# Patient Record
Sex: FEMALE | Race: WHITE | NOT HISPANIC OR LATINO | Employment: PART TIME | URBAN - METROPOLITAN AREA
[De-identification: names, ages, dates, MRNs, and addresses within clinical notes are randomized per-mention and may not be internally consistent; named-entity substitution may affect disease eponyms.]

---

## 2017-10-16 ENCOUNTER — CONVERSION ENCOUNTER (OUTPATIENT)
Dept: OTHER | Facility: HOSPITAL | Age: 62
End: 2017-10-16

## 2018-01-03 ENCOUNTER — OFFICE VISIT CONVERTED (OUTPATIENT)
Dept: FAMILY MEDICINE CLINIC | Age: 63
End: 2018-01-03
Attending: FAMILY MEDICINE

## 2018-02-06 ENCOUNTER — OFFICE VISIT CONVERTED (OUTPATIENT)
Dept: FAMILY MEDICINE CLINIC | Age: 63
End: 2018-02-06
Attending: FAMILY MEDICINE

## 2018-04-11 ENCOUNTER — OFFICE VISIT CONVERTED (OUTPATIENT)
Dept: FAMILY MEDICINE CLINIC | Age: 63
End: 2018-04-11
Attending: FAMILY MEDICINE

## 2018-06-27 ENCOUNTER — OFFICE VISIT CONVERTED (OUTPATIENT)
Dept: FAMILY MEDICINE CLINIC | Age: 63
End: 2018-06-27
Attending: FAMILY MEDICINE

## 2018-10-19 ENCOUNTER — OFFICE VISIT CONVERTED (OUTPATIENT)
Dept: FAMILY MEDICINE CLINIC | Age: 63
End: 2018-10-19
Attending: FAMILY MEDICINE

## 2018-10-29 ENCOUNTER — OFFICE VISIT CONVERTED (OUTPATIENT)
Dept: FAMILY MEDICINE CLINIC | Age: 63
End: 2018-10-29
Attending: FAMILY MEDICINE

## 2018-11-02 ENCOUNTER — OFFICE VISIT CONVERTED (OUTPATIENT)
Dept: PHYSICAL THERAPY | Facility: CLINIC | Age: 63
End: 2018-11-02
Attending: ORTHOPAEDIC SURGERY

## 2018-11-02 ENCOUNTER — CONVERSION ENCOUNTER (OUTPATIENT)
Dept: PHYSICAL THERAPY | Facility: CLINIC | Age: 63
End: 2018-11-02

## 2018-11-20 ENCOUNTER — OFFICE VISIT CONVERTED (OUTPATIENT)
Dept: CARDIOLOGY | Facility: CLINIC | Age: 63
End: 2018-11-20
Attending: SPECIALIST

## 2018-12-28 ENCOUNTER — CONVERSION ENCOUNTER (OUTPATIENT)
Dept: CARDIOLOGY | Facility: CLINIC | Age: 63
End: 2018-12-28
Attending: SPECIALIST

## 2019-01-04 ENCOUNTER — HOSPITAL ENCOUNTER (OUTPATIENT)
Dept: OTHER | Facility: HOSPITAL | Age: 64
Discharge: HOME | End: 2019-01-04
Attending: FAMILY MEDICINE

## 2019-01-04 ENCOUNTER — OFFICE VISIT CONVERTED (OUTPATIENT)
Dept: FAMILY MEDICINE CLINIC | Age: 64
End: 2019-01-04
Attending: FAMILY MEDICINE

## 2019-01-04 LAB
25(OH)D3 SERPL-MCNC: 48 NG/ML (ref 30–100)
ALBUMIN SERPL-MCNC: 4.3 G/DL (ref 3.5–5)
ALBUMIN/GLOB SERPL: 1.7 {RATIO} (ref 1.4–2.6)
ALP SERPL-CCNC: 66 U/L (ref 43–160)
ALT SERPL-CCNC: 13 U/L (ref 10–40)
ANION GAP SERPL CALC-SCNC: 15 MMOL/L (ref 8–19)
AST SERPL-CCNC: 14 U/L (ref 15–50)
BASOPHILS # BLD MANUAL: 0.07 10*3/UL (ref 0–0.2)
BASOPHILS NFR BLD MANUAL: 0.7 % (ref 0–3)
BILIRUB SERPL-MCNC: 0.6 MG/DL (ref 0.2–1.3)
BUN SERPL-MCNC: 12 MG/DL (ref 5–25)
BUN/CREAT SERPL: 19 {RATIO} (ref 6–20)
CALCIUM SERPL-MCNC: 9.2 MG/DL (ref 8.7–10.4)
CHLORIDE SERPL-SCNC: 105 MMOL/L (ref 99–111)
CHOLEST SERPL-MCNC: 170 MG/DL (ref 107–200)
CHOLEST/HDLC SERPL: 3.6 {RATIO} (ref 3–6)
CONV CO2: 26 MMOL/L (ref 22–32)
CONV TOTAL PROTEIN: 6.9 G/DL (ref 6.3–8.2)
CREAT UR-MCNC: 0.64 MG/DL (ref 0.5–0.9)
DEPRECATED RDW RBC AUTO: 42.6 FL
EOSINOPHIL # BLD MANUAL: 0.36 10*3/UL (ref 0–0.7)
EOSINOPHIL NFR BLD MANUAL: 3.8 % (ref 0–7)
ERYTHROCYTE [DISTWIDTH] IN BLOOD BY AUTOMATED COUNT: 12.6 % (ref 11.5–14.5)
GFR SERPLBLD BASED ON 1.73 SQ M-ARVRAT: >60 ML/MIN/{1.73_M2}
GLOBULIN UR ELPH-MCNC: 2.6 G/DL (ref 2–3.5)
GLUCOSE SERPL-MCNC: 108 MG/DL (ref 65–99)
GRANS (ABSOLUTE): 6.12 10*3/UL (ref 2–8)
GRANS: 64.7 % (ref 30–85)
HBA1C MFR BLD: 14.2 G/DL (ref 12–16)
HCT VFR BLD AUTO: 43.7 % (ref 37–47)
HDLC SERPL-MCNC: 47 MG/DL (ref 40–60)
IMM GRANULOCYTES # BLD: 0.01 10*3/UL (ref 0–0.54)
IMM GRANULOCYTES NFR BLD: 0.1 % (ref 0–0.43)
LDLC SERPL CALC-MCNC: 104 MG/DL (ref 70–100)
LYMPHOCYTES # BLD MANUAL: 2.21 10*3/UL (ref 1–5)
LYMPHOCYTES NFR BLD MANUAL: 7.4 % (ref 3–10)
MCH RBC QN AUTO: 29.6 PG (ref 27–31)
MCHC RBC AUTO-ENTMCNC: 32.5 G/DL (ref 33–37)
MCV RBC AUTO: 91.2 FL (ref 81–99)
MONOCYTES # BLD AUTO: 0.7 10*3/UL (ref 0.2–1.2)
OSMOLALITY SERPL CALC.SUM OF ELEC: 294 MOSM/KG (ref 273–304)
PLATELET # BLD AUTO: 281 10*3/UL (ref 130–400)
PMV BLD AUTO: 10.8 FL (ref 7.4–10.4)
POTASSIUM SERPL-SCNC: 3.6 MMOL/L (ref 3.5–5.3)
RBC # BLD AUTO: 4.79 10*6/UL (ref 4.2–5.4)
SODIUM SERPL-SCNC: 142 MMOL/L (ref 135–147)
T4 FREE SERPL-MCNC: 1.6 NG/DL (ref 0.9–1.8)
TRIGL SERPL-MCNC: 97 MG/DL (ref 40–150)
TSH SERPL-ACNC: 0.14 M[IU]/L (ref 0.27–4.2)
VARIANT LYMPHS NFR BLD MANUAL: 23.3 % (ref 20–45)
VLDLC SERPL-MCNC: 19 MG/DL (ref 5–37)
WBC # BLD AUTO: 9.47 10*3/UL (ref 4.8–10.8)

## 2019-01-05 LAB — HCV AB S/CO SERPL IA: 1.7 S/CO RATIO (ref 0–0.9)

## 2019-01-08 ENCOUNTER — HOSPITAL ENCOUNTER (OUTPATIENT)
Dept: PERIOP | Facility: HOSPITAL | Age: 64
Setting detail: HOSPITAL OUTPATIENT SURGERY
Discharge: HOME | End: 2019-01-08
Attending: ORTHOPAEDIC SURGERY

## 2019-01-08 LAB
CONV LAST MENSTURAL PERIOD: NORMAL
SPECIMEN SOURCE: NORMAL
SPECIMEN SOURCE: NORMAL
THIN PREP CVX: NORMAL

## 2019-01-25 ENCOUNTER — OFFICE VISIT CONVERTED (OUTPATIENT)
Dept: ORTHOPEDIC SURGERY | Facility: CLINIC | Age: 64
End: 2019-01-25
Attending: ORTHOPAEDIC SURGERY

## 2019-02-08 ENCOUNTER — HOSPITAL ENCOUNTER (OUTPATIENT)
Dept: GASTROENTEROLOGY | Facility: HOSPITAL | Age: 64
Discharge: HOME | End: 2019-02-08
Attending: NURSE PRACTITIONER

## 2019-02-08 ENCOUNTER — OFFICE VISIT CONVERTED (OUTPATIENT)
Dept: GASTROENTEROLOGY | Facility: HOSPITAL | Age: 64
End: 2019-02-08
Attending: NURSE PRACTITIONER

## 2019-02-10 LAB
CONV HEPATITIS C TEST INFO: NORMAL
HCV RNA SERPL NAA+PROBE-ACNC: NORMAL IU/ML

## 2019-02-21 ENCOUNTER — OFFICE VISIT CONVERTED (OUTPATIENT)
Dept: ORTHOPEDIC SURGERY | Facility: CLINIC | Age: 64
End: 2019-02-21
Attending: ORTHOPAEDIC SURGERY

## 2019-03-04 ENCOUNTER — HOSPITAL ENCOUNTER (OUTPATIENT)
Dept: PREADMISSION TESTING | Facility: HOSPITAL | Age: 64
Discharge: HOME | End: 2019-03-04
Attending: ORTHOPAEDIC SURGERY

## 2019-03-04 LAB
ANION GAP SERPL CALC-SCNC: 13 MMOL/L (ref 8–19)
APTT BLD: 25.6 S (ref 22.2–34.2)
BASOPHILS # BLD AUTO: 0.07 10*3/UL (ref 0–0.2)
BASOPHILS NFR BLD AUTO: 0.6 % (ref 0–3)
BUN SERPL-MCNC: 13 MG/DL (ref 5–25)
BUN/CREAT SERPL: 19 {RATIO} (ref 6–20)
CALCIUM SERPL-MCNC: 9.2 MG/DL (ref 8.7–10.4)
CHLORIDE SERPL-SCNC: 104 MMOL/L (ref 99–111)
CONV ABS IMM GRAN: 0.03 10*3/UL (ref 0–0.2)
CONV CO2: 27 MMOL/L (ref 22–32)
CONV IMMATURE GRAN: 0.3 % (ref 0–1.8)
CREAT UR-MCNC: 0.67 MG/DL (ref 0.5–0.9)
DEPRECATED RDW RBC AUTO: 44 FL (ref 36.4–46.3)
EOSINOPHIL # BLD AUTO: 0.27 10*3/UL (ref 0–0.7)
EOSINOPHIL # BLD AUTO: 2.5 % (ref 0–7)
ERYTHROCYTE [DISTWIDTH] IN BLOOD BY AUTOMATED COUNT: 13 % (ref 11.7–14.4)
GFR SERPLBLD BASED ON 1.73 SQ M-ARVRAT: >60 ML/MIN/{1.73_M2}
GLUCOSE SERPL-MCNC: 119 MG/DL (ref 65–99)
HBA1C MFR BLD: 14.8 G/DL (ref 12–16)
HCT VFR BLD AUTO: 46.9 % (ref 37–47)
INR PPP: 1.03 (ref 2–3)
LYMPHOCYTES # BLD AUTO: 2.38 10*3/UL (ref 1–5)
MCH RBC QN AUTO: 29.3 PG (ref 27–31)
MCHC RBC AUTO-ENTMCNC: 31.6 G/DL (ref 33–37)
MCV RBC AUTO: 92.9 FL (ref 81–99)
MONOCYTES # BLD AUTO: 0.73 10*3/UL (ref 0.2–1.2)
MONOCYTES NFR BLD AUTO: 6.6 % (ref 3–10)
NEUTROPHILS # BLD AUTO: 7.54 10*3/UL (ref 2–8)
NEUTROPHILS NFR BLD AUTO: 68.4 % (ref 30–85)
NRBC CBCN: 0 % (ref 0–0.7)
OSMOLALITY SERPL CALC.SUM OF ELEC: 291 MOSM/KG (ref 273–304)
PLATELET # BLD AUTO: 277 10*3/UL (ref 130–400)
PMV BLD AUTO: 11 FL (ref 9.4–12.3)
POTASSIUM SERPL-SCNC: 4.1 MMOL/L (ref 3.5–5.3)
PROTHROMBIN TIME: 10.6 S (ref 9.4–12)
RBC # BLD AUTO: 5.05 10*6/UL (ref 4.2–5.4)
SODIUM SERPL-SCNC: 140 MMOL/L (ref 135–147)
VARIANT LYMPHS NFR BLD MANUAL: 21.6 % (ref 20–45)
WBC # BLD AUTO: 11.02 10*3/UL (ref 4.8–10.8)

## 2019-03-27 ENCOUNTER — HOSPITAL ENCOUNTER (OUTPATIENT)
Dept: PERIOP | Facility: HOSPITAL | Age: 64
Setting detail: HOSPITAL OUTPATIENT SURGERY
Discharge: HOME | End: 2019-03-28
Attending: FAMILY MEDICINE

## 2019-03-28 LAB
ANION GAP SERPL CALC-SCNC: 16 MMOL/L (ref 8–19)
BUN SERPL-MCNC: 13 MG/DL (ref 5–25)
BUN/CREAT SERPL: 18 {RATIO} (ref 6–20)
CALCIUM SERPL-MCNC: 8.6 MG/DL (ref 8.7–10.4)
CHLORIDE SERPL-SCNC: 102 MMOL/L (ref 99–111)
CONV CO2: 25 MMOL/L (ref 22–32)
CREAT UR-MCNC: 0.74 MG/DL (ref 0.5–0.9)
GFR SERPLBLD BASED ON 1.73 SQ M-ARVRAT: >60 ML/MIN/{1.73_M2}
GLUCOSE SERPL-MCNC: 165 MG/DL (ref 65–99)
HBA1C MFR BLD: 12.4 G/DL (ref 12–16)
HCT VFR BLD AUTO: 40.1 % (ref 37–47)
OSMOLALITY SERPL CALC.SUM OF ELEC: 290 MOSM/KG (ref 273–304)
POTASSIUM SERPL-SCNC: 4.6 MMOL/L (ref 3.5–5.3)
SODIUM SERPL-SCNC: 138 MMOL/L (ref 135–147)

## 2019-04-11 ENCOUNTER — OFFICE VISIT CONVERTED (OUTPATIENT)
Dept: ORTHOPEDIC SURGERY | Facility: CLINIC | Age: 64
End: 2019-04-11
Attending: ORTHOPAEDIC SURGERY

## 2019-04-12 ENCOUNTER — HOSPITAL ENCOUNTER (OUTPATIENT)
Dept: OTHER | Facility: HOSPITAL | Age: 64
Discharge: HOME | End: 2019-04-12
Attending: FAMILY MEDICINE

## 2019-04-12 ENCOUNTER — OFFICE VISIT CONVERTED (OUTPATIENT)
Dept: FAMILY MEDICINE CLINIC | Age: 64
End: 2019-04-12
Attending: FAMILY MEDICINE

## 2019-04-12 LAB
25(OH)D3 SERPL-MCNC: 43.2 NG/ML (ref 30–100)
ALBUMIN SERPL-MCNC: 4 G/DL (ref 3.5–5)
ALBUMIN/GLOB SERPL: 1.4 {RATIO} (ref 1.4–2.6)
ALP SERPL-CCNC: 119 U/L (ref 43–160)
ALT SERPL-CCNC: 22 U/L (ref 10–40)
ANION GAP SERPL CALC-SCNC: 18 MMOL/L (ref 8–19)
AST SERPL-CCNC: 21 U/L (ref 15–50)
BILIRUB SERPL-MCNC: 0.53 MG/DL (ref 0.2–1.3)
BUN SERPL-MCNC: 15 MG/DL (ref 5–25)
BUN/CREAT SERPL: 19 {RATIO} (ref 6–20)
CALCIUM SERPL-MCNC: 9.2 MG/DL (ref 8.7–10.4)
CHLORIDE SERPL-SCNC: 102 MMOL/L (ref 99–111)
CHOLEST SERPL-MCNC: 166 MG/DL (ref 107–200)
CHOLEST/HDLC SERPL: 4.2 {RATIO} (ref 3–6)
CONV CO2: 26 MMOL/L (ref 22–32)
CONV TOTAL PROTEIN: 6.9 G/DL (ref 6.3–8.2)
CREAT UR-MCNC: 0.78 MG/DL (ref 0.5–0.9)
GFR SERPLBLD BASED ON 1.73 SQ M-ARVRAT: >60 ML/MIN/{1.73_M2}
GLOBULIN UR ELPH-MCNC: 2.9 G/DL (ref 2–3.5)
GLUCOSE SERPL-MCNC: 135 MG/DL (ref 65–99)
HDLC SERPL-MCNC: 40 MG/DL (ref 40–60)
LDLC SERPL CALC-MCNC: 97 MG/DL (ref 70–100)
OSMOLALITY SERPL CALC.SUM OF ELEC: 297 MOSM/KG (ref 273–304)
POTASSIUM SERPL-SCNC: 3.6 MMOL/L (ref 3.5–5.3)
SODIUM SERPL-SCNC: 142 MMOL/L (ref 135–147)
TRIGL SERPL-MCNC: 146 MG/DL (ref 40–150)
TSH SERPL-ACNC: 2.28 M[IU]/L (ref 0.27–4.2)
VLDLC SERPL-MCNC: 29 MG/DL (ref 5–37)

## 2019-04-16 LAB
EST. AVERAGE GLUCOSE BLD GHB EST-MCNC: 108 MG/DL
HBA1C MFR BLD: 5.4 % (ref 3.5–5.7)

## 2019-04-23 ENCOUNTER — CONVERSION ENCOUNTER (OUTPATIENT)
Dept: OTHER | Facility: HOSPITAL | Age: 64
End: 2019-04-23

## 2019-04-23 ENCOUNTER — OFFICE VISIT CONVERTED (OUTPATIENT)
Dept: CARDIOLOGY | Facility: CLINIC | Age: 64
End: 2019-04-23
Attending: SPECIALIST

## 2019-05-09 ENCOUNTER — HOSPITAL ENCOUNTER (OUTPATIENT)
Dept: CARDIOLOGY | Facility: HOSPITAL | Age: 64
Discharge: HOME | End: 2019-05-09
Attending: ORTHOPAEDIC SURGERY

## 2019-05-09 ENCOUNTER — OFFICE VISIT CONVERTED (OUTPATIENT)
Dept: ORTHOPEDIC SURGERY | Facility: CLINIC | Age: 64
End: 2019-05-09
Attending: ORTHOPAEDIC SURGERY

## 2019-05-09 LAB
BASOPHILS # BLD AUTO: 0.06 10*3/UL (ref 0–0.2)
BASOPHILS NFR BLD AUTO: 0.6 % (ref 0–3)
CONV ABS IMM GRAN: 0.02 10*3/UL (ref 0–0.2)
CONV IMMATURE GRAN: 0.2 % (ref 0–1.8)
CRP SERPL-MCNC: 11.1 MG/L (ref 0–5)
DEPRECATED RDW RBC AUTO: 42.8 FL (ref 36.4–46.3)
EOSINOPHIL # BLD AUTO: 0.28 10*3/UL (ref 0–0.7)
EOSINOPHIL # BLD AUTO: 2.9 % (ref 0–7)
ERYTHROCYTE [DISTWIDTH] IN BLOOD BY AUTOMATED COUNT: 12.8 % (ref 11.7–14.4)
ERYTHROCYTE [SEDIMENTATION RATE] IN BLOOD: 9 MM/H (ref 0–30)
HBA1C MFR BLD: 13.2 G/DL (ref 12–16)
HCT VFR BLD AUTO: 42 % (ref 37–47)
LYMPHOCYTES # BLD AUTO: 3.43 10*3/UL (ref 1–5)
MCH RBC QN AUTO: 28.9 PG (ref 27–31)
MCHC RBC AUTO-ENTMCNC: 31.4 G/DL (ref 33–37)
MCV RBC AUTO: 91.9 FL (ref 81–99)
MONOCYTES # BLD AUTO: 0.61 10*3/UL (ref 0.2–1.2)
MONOCYTES NFR BLD AUTO: 6.3 % (ref 3–10)
NEUTROPHILS # BLD AUTO: 5.24 10*3/UL (ref 2–8)
NEUTROPHILS NFR BLD AUTO: 54.4 % (ref 30–85)
NRBC CBCN: 0 % (ref 0–0.7)
PLATELET # BLD AUTO: 279 10*3/UL (ref 130–400)
PMV BLD AUTO: 11.1 FL (ref 9.4–12.3)
RBC # BLD AUTO: 4.57 10*6/UL (ref 4.2–5.4)
VARIANT LYMPHS NFR BLD MANUAL: 35.6 % (ref 20–45)
WBC # BLD AUTO: 9.64 10*3/UL (ref 4.8–10.8)

## 2019-06-11 ENCOUNTER — OFFICE VISIT CONVERTED (OUTPATIENT)
Dept: ORTHOPEDIC SURGERY | Facility: CLINIC | Age: 64
End: 2019-06-11
Attending: ORTHOPAEDIC SURGERY

## 2019-07-17 ENCOUNTER — OFFICE VISIT CONVERTED (OUTPATIENT)
Dept: FAMILY MEDICINE CLINIC | Age: 64
End: 2019-07-17
Attending: FAMILY MEDICINE

## 2019-07-17 ENCOUNTER — HOSPITAL ENCOUNTER (OUTPATIENT)
Dept: OTHER | Facility: HOSPITAL | Age: 64
Discharge: HOME | End: 2019-07-17
Attending: FAMILY MEDICINE

## 2019-07-17 LAB
BASOPHILS # BLD MANUAL: 0.04 10*3/UL (ref 0–0.2)
BASOPHILS NFR BLD MANUAL: 0.5 % (ref 0–3)
CRP SERPL-MCNC: 6 MG/L (ref 0–5)
DEPRECATED RDW RBC AUTO: 41.8 FL
EOSINOPHIL # BLD MANUAL: 0.28 10*3/UL (ref 0–0.7)
EOSINOPHIL NFR BLD MANUAL: 3.6 % (ref 0–7)
ERYTHROCYTE [DISTWIDTH] IN BLOOD BY AUTOMATED COUNT: 13 % (ref 11.5–14.5)
ERYTHROCYTE [SEDIMENTATION RATE] IN BLOOD: 1 MM/H (ref 0–30)
GRANS (ABSOLUTE): 4.45 10*3/UL (ref 2–8)
GRANS: 57.7 % (ref 30–85)
HBA1C MFR BLD: 14.7 G/DL (ref 12–16)
HCT VFR BLD AUTO: 44.8 % (ref 37–47)
IMM GRANULOCYTES # BLD: 0 10*3/UL (ref 0–0.54)
IMM GRANULOCYTES NFR BLD: 0 % (ref 0–0.43)
LYMPHOCYTES # BLD MANUAL: 2.28 10*3/UL (ref 1–5)
LYMPHOCYTES NFR BLD MANUAL: 8.7 % (ref 3–10)
MCH RBC QN AUTO: 28.5 PG (ref 27–31)
MCHC RBC AUTO-ENTMCNC: 32.8 G/DL (ref 33–37)
MCV RBC AUTO: 86.8 FL (ref 81–99)
MONOCYTES # BLD AUTO: 0.67 10*3/UL (ref 0.2–1.2)
PLATELET # BLD AUTO: 279 10*3/UL (ref 130–400)
PMV BLD AUTO: 10.1 FL (ref 7.4–10.4)
RBC # BLD AUTO: 5.16 10*6/UL (ref 4.2–5.4)
URATE SERPL-MCNC: 5 MG/DL (ref 2.5–7.5)
VARIANT LYMPHS NFR BLD MANUAL: 29.5 % (ref 20–45)
WBC # BLD AUTO: 7.72 10*3/UL (ref 4.8–10.8)

## 2019-07-19 ENCOUNTER — OFFICE VISIT CONVERTED (OUTPATIENT)
Dept: PHYSICAL THERAPY | Facility: CLINIC | Age: 64
End: 2019-07-19
Attending: ORTHOPAEDIC SURGERY

## 2019-08-16 ENCOUNTER — OFFICE VISIT CONVERTED (OUTPATIENT)
Dept: PHYSICAL THERAPY | Facility: CLINIC | Age: 64
End: 2019-08-16
Attending: ORTHOPAEDIC SURGERY

## 2019-09-05 ENCOUNTER — OFFICE VISIT CONVERTED (OUTPATIENT)
Dept: FAMILY MEDICINE CLINIC | Age: 64
End: 2019-09-05
Attending: FAMILY MEDICINE

## 2019-10-02 ENCOUNTER — HOSPITAL ENCOUNTER (OUTPATIENT)
Dept: OTHER | Facility: HOSPITAL | Age: 64
Discharge: HOME | End: 2019-10-02
Attending: FAMILY MEDICINE

## 2019-10-02 ENCOUNTER — OFFICE VISIT CONVERTED (OUTPATIENT)
Dept: FAMILY MEDICINE CLINIC | Age: 64
End: 2019-10-02
Attending: FAMILY MEDICINE

## 2019-10-02 LAB
ALBUMIN SERPL-MCNC: 4.3 G/DL (ref 3.5–5)
ALBUMIN/GLOB SERPL: 1.5 {RATIO} (ref 1.4–2.6)
ALP SERPL-CCNC: 96 U/L (ref 43–160)
ALT SERPL-CCNC: 11 U/L (ref 10–40)
ANION GAP SERPL CALC-SCNC: 19 MMOL/L (ref 8–19)
AST SERPL-CCNC: 17 U/L (ref 15–50)
BILIRUB SERPL-MCNC: 0.75 MG/DL (ref 0.2–1.3)
BUN SERPL-MCNC: 13 MG/DL (ref 5–25)
BUN/CREAT SERPL: 17 {RATIO} (ref 6–20)
CALCIUM SERPL-MCNC: 9.7 MG/DL (ref 8.7–10.4)
CHLORIDE SERPL-SCNC: 104 MMOL/L (ref 99–111)
CHOLEST SERPL-MCNC: 204 MG/DL (ref 107–200)
CHOLEST/HDLC SERPL: 4 {RATIO} (ref 3–6)
CONV CO2: 22 MMOL/L (ref 22–32)
CONV TOTAL PROTEIN: 7.2 G/DL (ref 6.3–8.2)
CREAT UR-MCNC: 0.78 MG/DL (ref 0.5–0.9)
GFR SERPLBLD BASED ON 1.73 SQ M-ARVRAT: >60 ML/MIN/{1.73_M2}
GLOBULIN UR ELPH-MCNC: 2.9 G/DL (ref 2–3.5)
GLUCOSE SERPL-MCNC: 110 MG/DL (ref 65–99)
HDLC SERPL-MCNC: 51 MG/DL (ref 40–60)
LDLC SERPL CALC-MCNC: 119 MG/DL (ref 70–100)
OSMOLALITY SERPL CALC.SUM OF ELEC: 293 MOSM/KG (ref 273–304)
POTASSIUM SERPL-SCNC: 3.9 MMOL/L (ref 3.5–5.3)
SODIUM SERPL-SCNC: 141 MMOL/L (ref 135–147)
T4 FREE SERPL-MCNC: 2 NG/DL (ref 0.9–1.8)
TRIGL SERPL-MCNC: 172 MG/DL (ref 40–150)
TSH SERPL-ACNC: 0.09 M[IU]/L (ref 0.27–4.2)
VLDLC SERPL-MCNC: 34 MG/DL (ref 5–37)

## 2019-10-03 LAB — 25(OH)D3 SERPL-MCNC: 47.9 NG/ML (ref 30–100)

## 2019-10-04 LAB — T3FREE SERPL-MCNC: 3.3 PG/ML (ref 2–4.4)

## 2019-10-29 ENCOUNTER — CONVERSION ENCOUNTER (OUTPATIENT)
Dept: CARDIOLOGY | Facility: CLINIC | Age: 64
End: 2019-10-29

## 2019-10-29 ENCOUNTER — OFFICE VISIT CONVERTED (OUTPATIENT)
Dept: CARDIOLOGY | Facility: CLINIC | Age: 64
End: 2019-10-29
Attending: SPECIALIST

## 2020-02-12 ENCOUNTER — HOSPITAL ENCOUNTER (OUTPATIENT)
Dept: OTHER | Facility: HOSPITAL | Age: 65
Discharge: HOME | End: 2020-02-12
Attending: FAMILY MEDICINE

## 2020-03-31 ENCOUNTER — OFFICE VISIT CONVERTED (OUTPATIENT)
Dept: FAMILY MEDICINE CLINIC | Age: 65
End: 2020-03-31
Attending: FAMILY MEDICINE

## 2020-05-12 ENCOUNTER — TELEMEDICINE CONVERTED (OUTPATIENT)
Dept: CARDIOLOGY | Facility: CLINIC | Age: 65
End: 2020-05-12
Attending: SPECIALIST

## 2020-06-18 ENCOUNTER — HOSPITAL ENCOUNTER (OUTPATIENT)
Dept: OTHER | Facility: HOSPITAL | Age: 65
Discharge: HOME | End: 2020-06-18
Attending: FAMILY MEDICINE

## 2020-06-18 ENCOUNTER — OFFICE VISIT CONVERTED (OUTPATIENT)
Dept: FAMILY MEDICINE CLINIC | Age: 65
End: 2020-06-18
Attending: FAMILY MEDICINE

## 2020-06-18 LAB — TSH SERPL-ACNC: 0.45 M[IU]/L (ref 0.27–4.2)

## 2020-06-30 ENCOUNTER — HOSPITAL ENCOUNTER (OUTPATIENT)
Dept: OTHER | Facility: HOSPITAL | Age: 65
Discharge: HOME | End: 2020-06-30
Attending: FAMILY MEDICINE

## 2020-09-15 ENCOUNTER — CONVERSION ENCOUNTER (OUTPATIENT)
Dept: CARDIOLOGY | Facility: CLINIC | Age: 65
End: 2020-09-15

## 2020-09-15 ENCOUNTER — OFFICE VISIT CONVERTED (OUTPATIENT)
Dept: CARDIOLOGY | Facility: CLINIC | Age: 65
End: 2020-09-15
Attending: SPECIALIST

## 2020-09-17 ENCOUNTER — OFFICE VISIT CONVERTED (OUTPATIENT)
Dept: CARDIOLOGY | Facility: CLINIC | Age: 65
End: 2020-09-17
Attending: SPECIALIST

## 2020-10-13 ENCOUNTER — OFFICE VISIT CONVERTED (OUTPATIENT)
Dept: FAMILY MEDICINE CLINIC | Age: 65
End: 2020-10-13
Attending: FAMILY MEDICINE

## 2020-10-14 ENCOUNTER — HOSPITAL ENCOUNTER (OUTPATIENT)
Dept: OTHER | Facility: HOSPITAL | Age: 65
Discharge: HOME | End: 2020-10-14
Attending: FAMILY MEDICINE

## 2020-10-14 LAB
25(OH)D3 SERPL-MCNC: 44.4 NG/ML (ref 30–100)
CHOLEST SERPL-MCNC: 176 MG/DL (ref 107–200)
CHOLEST/HDLC SERPL: 3 {RATIO} (ref 3–6)
HDLC SERPL-MCNC: 58 MG/DL (ref 40–60)
LDLC SERPL CALC-MCNC: 102 MG/DL (ref 70–100)
T4 FREE SERPL-MCNC: 1.5 NG/DL (ref 0.9–1.8)
TRIGL SERPL-MCNC: 78 MG/DL (ref 40–150)
TSH SERPL-ACNC: 0.07 M[IU]/L (ref 0.27–4.2)
URATE SERPL-MCNC: 4.5 MG/DL (ref 2.5–7.5)
VLDLC SERPL-MCNC: 16 MG/DL (ref 5–37)

## 2020-11-16 ENCOUNTER — OFFICE VISIT CONVERTED (OUTPATIENT)
Dept: FAMILY MEDICINE CLINIC | Age: 65
End: 2020-11-16
Attending: FAMILY MEDICINE

## 2021-04-07 ENCOUNTER — HOSPITAL ENCOUNTER (OUTPATIENT)
Dept: OTHER | Facility: HOSPITAL | Age: 66
Discharge: HOME | End: 2021-04-07
Attending: FAMILY MEDICINE

## 2021-04-20 ENCOUNTER — OFFICE VISIT CONVERTED (OUTPATIENT)
Dept: FAMILY MEDICINE CLINIC | Age: 66
End: 2021-04-20
Attending: FAMILY MEDICINE

## 2021-04-23 ENCOUNTER — HOSPITAL ENCOUNTER (OUTPATIENT)
Dept: OTHER | Facility: HOSPITAL | Age: 66
Discharge: HOME | End: 2021-04-23
Attending: FAMILY MEDICINE

## 2021-05-10 NOTE — PROCEDURES
"   Procedure Note      Patient Name: Yulissa Spaulding   Patient ID: [id number]   Sex: Female   YOB: 1955    Primary Care Provider: Rayne Gee MD   Referring Provider: Willie Justin MD    Visit Date: September 17, 2020    Provider: Cas Nolasco MD   Location: [facility]   Location Address: [address] [place] [address], [address]   [place]  [id number]   Location Phone: [phone]          FINAL REPORT   TRANSTHORACIC ECHOCARDIOGRAM REPORT    Diagnosis: Cardiomyopathy, preoperative examination.   Height: 5'10\" Weight: 258 B/P: BLOOD PRESSURE BSA: 2.33   Tech: [facility]   MEASUREMENTS:  RVID (Diastole) : RVID. (NORMAL: 0.7 to 2.4 cm max)   LVID (Systole): 3.5 cm (Diastole): 5 cm . (NORMAL: 3.7 - 5.4 cm)   Posterior Wall Thickness (Diastole): 1 cm. (NORMAL: 0.8 - 1.1 cm)   Septal Thickness (Diastole): 1.1 cm. (NORMAL: 0.7 - 1.2 cm)   LAID (Systole): 4 cm. (NORMAL: 1.9 - 3.8 cm)   Aortic Root Diameter (Diastole): 2.9 cm. (NORMAL: 2.0 - 3.7 cm)   DOPPLER:  E/A ratio : E/A RATIO (NORMAL 0.8-2.0)   DT: 222 msec (NORMAL 140-240 msec.)   IVRT 116 m/sec (NORMAL  m/sec.)   E/E': 14 (NORMAL <8 avg.)   COMMENTS:  The patient underwent 2-D, M-Mode, and Doppler examination, including pulse-wave, continuous-wave, and color-flow analysis; the study is technically adequate.   FINDINGS:  AORTIC VALVE: Fibrotic.   MITRAL VALVE: Fibrotic.   TRICUSPID VALVE: Normal.   PULMONIC VALVE: Not well visualized.   LEFT ATRIUM: Enlarged. LA volume index is 23 mL/m2.   AORTIC ROOT: Normal in size with adequate motion.   LEFT VENTRICLE: Normal left ventricular systolic function. Ejection fraction 59%.   RIGHT ATRIUM: Normal.   RIGHT VENTRICLE: Normal size and function.   PERICARDIUM: Unremarkable. No evidence of effusion.   INFERIOR VENA CAVA: Diameter is 1.5 cm.   DOPPLER: Doppler examination of the aortic, mitral, tricuspid, and pulmonary valves was performed. Normal pulmonary artery systolic pressure by Doppler. " There is mild to moderate mitral regurgitation and trace tricuspid regurgitation.   Faxed: 09/18/2020      CONCLUSION:  1.  Normal left ventricular systolic function.   2.  Enlarged left atrium.   3.  Mild to moderate mitral regurgitation.   4.  Trace tricuspid regurgitation.      MD ZULLY Oconnor/cathy    This note was transcribed by Freida Rogers.  cathy/ZULLY  The above service was transcribed by Freida Rogers, and I attest to the accuracy of the note.  ZULLY                 Electronically Signed by: Maya Rogers-, Other -Author on September 18, 2020 08:05:26 AM  Electronically Co-signed by: Cas Nolasco MD -Reviewer on September 24, 2020 09:50:22 AM

## 2021-05-13 NOTE — PROGRESS NOTES
Progress Note      Patient Name: Yulissa Spaulding   Patient ID: [id number]   Sex: Female   YOB: 1955    Primary Care Provider: Rayne Gee MD   Referring Provider: Willie Justin MD    Visit Date: May 12, 2020    Provider: Cas Nolasco MD   Location: [facility]   Location Address: [address] [place] [address] A   [place]  [id number]   Location Phone: [phone]          Chief Complaint  · Atrial fibrillation.  · Hypertension.       History Of Present Illness  Video Conferencing Visit  Yulissa Spaulding is a 64 year old /White female with a history of permanent atrial fibrillation who denies any chest pain or shortness of breath. Blood pressure well controlled at home. She is presenting for evaluation via video conferencing. Verbal consent obtained before beginning visit. Telehealth visit due to COVID-19.   The following staff were present during this visit: Provider only.   CURRENT MEDICATIONS: Eliquis 5 mg b.i.d.; diltiazem  mg daily; Pristiq 100 mg daily; hydrochlorothiazide 25 mg daily; Singulair 10 mg daily; levothyroxine 150 mcg daily; Zyrtec 10 mg daily; vitamin D3; melatonin 10 mg daily; buspirone 15 mg daily. The dosage and frequency of the medications were reviewed with the patient.   PAST MEDICAL HISTORY: Atrial fibrillation; Hypothyroidism; Osteoarthritis.   FAMILY HISTORY: Positive for heart disease. Negative for diabetes mellitus or hypertension.   PSYCHOSOCIAL HISTORY: Not indicated on Health History Form.       Review of Systems  · Cardiovascular  o Denies  o : palpitations (fast, fluttering, or skipping beats), swelling (feet, ankles, hands), shortness of breath while walking or lying flat, chest pain or angina pectoris   · Respiratory  o Denies  o : chronic or frequent cough, asthma or wheezing      Vitals     Per patient, at-home vitals:   Blood pressure 111/90.  Heart rate 70.       Physical  Examination  · Constitutional  o Appearance  o : Alert, oriented x3.  · Cardiovascular  o Heart  o : No JVD.  o Peripheral Vascular System  o :   § Extremities  § : No pedal edema.          Assessment     ASSESSMENT & PLAN:    1.  Permanent atrial fibrillation with controlled heart rate.  Continue Eliquis for stroke prevention.  Continue        diltiazem ER for rate control.    2.  Essential hypertension, controlled.  Continue current dose of hydrochlorothiazide.  3.  Hypothyroidism.  Continue current dose of levothyroxine.  4.  See me back in 6 months.             Electronically Signed by: Vilma Hernández-, Other -Author on May 19, 2020 09:35:05 AM  Electronically Co-signed by: Cas Nolasco MD -Reviewer on May 27, 2020 11:51:16 AM

## 2021-05-13 NOTE — PROGRESS NOTES
"   Progress Note      Patient Name: Yulissa Spaulding   Patient ID: [id number]   Sex: Female   YOB: 1955    Primary Care Provider: Rayne Gee MD   Referring Provider: Willie Jutsin MD    Visit Date: September 15, 2020    Provider: Cas Nolasco MD   Location: [facility]   Location Address: [address] [place] [address], [facility]   [place]  [id number]   Location Phone: [phone]          Chief Complaint  · Atrial fibrillation   · Hypertension       History Of Present Illness  Yulissa Spaulding is a 64 year old /White female with a history of permanent atrial fibrillation and hypertension. She denies any chest pain or shortness of breath.   CURRENT MEDICATIONS: include Pristiq 100 mg daily; Montelukast 10 mg daily; HCTZ 25 mg daily; Levothyroxine 0.0175 mg daily; Zyrtec 10 mg daily; Vitamin D3; Eliquis 5 mg b.i.d; Diltiazem 180 mg daily; Buspirone 15 mg b.i.d.; melatonin. The dosage and frequency of the medications were reviewed with the patient.   PAST MEDICAL HISTORY: Atrial fibrillation; hypothyroidism; osteoarthritis.   FAMILY HISTORY: Positive for hypertension and heart disease. Negative for diabetes.   PSYCHOSOCIAL HISTORY: Positive for mood changes and depression. She rarely drinks alcohol and never used tobacco.       Review of Systems  · Cardiovascular  o Denies  o : palpitations (fast, fluttering, or skipping beats), swelling (feet, ankles, hands), shortness of breath while walking or lying flat, chest pain or angina pectoris   · Respiratory  o Denies  o : chronic or frequent cough, asthma or wheezing      Vitals  Date Time BP Position Site L\R Cuff Size HR RR TEMP (F) WT  HT  BMI kg/m2 BSA m2 O2 Sat        09/15/2020 03:03 /98 Sitting    96 - R   258lbs 0oz 5'  10\" 37.02 2.4           Physical Examination  · Constitutional  o Appearance  o : Awake, alert, cooperative, pleasant.  · Respiratory  o Inspection of Chest  o : No chest wall deformities, moving " equal.  o Auscultation of Lungs  o : Good air entry with vesicular breath sounds.  · Cardiovascular  o Heart  o :   § Auscultation of Heart  § : S1 and S2 regular. No S3. No S4. No murmurs.  o Peripheral Vascular System  o :   § Extremities  § : Peripheral pulses were well felt. No edema. No cyanosis.  · Gastrointestinal  o Abdominal Examination  o : No masses or organomegaly noted.          Assessment     ASSESSMENT AND PLAN:    1.  Permanent atrial fibrillation with a controlled heart rate:  Continue Eliquis for stroke prevention.  Continue        Diltiazem for rate control.  2.  Essential hypertension controlled:  Continue current dose of HCTZ.  3.  See me back in 6 months.    Cas Nolasco MD, [facility]  ZULLY/brissa           This note was transcribed by Dotty Tabor.  brissa/ZULLY  The above service was transcribed by Dotty Tabor, and I attest to the accuracy of the note.  ZULLY               Electronically Signed by: Dotty Tabor-, -Author on September 17, 2020 06:37:31 AM  Electronically Co-signed by: Cas Nolasco MD -Reviewer on September 24, 2020 09:50:36 AM

## 2021-05-14 VITALS
BODY MASS INDEX: 36.94 KG/M2 | HEART RATE: 96 BPM | SYSTOLIC BLOOD PRESSURE: 134 MMHG | WEIGHT: 258 LBS | DIASTOLIC BLOOD PRESSURE: 98 MMHG | HEIGHT: 70 IN

## 2021-05-15 VITALS — OXYGEN SATURATION: 95 % | HEIGHT: 70 IN | HEART RATE: 64 BPM

## 2021-05-15 VITALS
SYSTOLIC BLOOD PRESSURE: 120 MMHG | HEIGHT: 70 IN | WEIGHT: 263 LBS | HEART RATE: 108 BPM | DIASTOLIC BLOOD PRESSURE: 86 MMHG | BODY MASS INDEX: 37.65 KG/M2

## 2021-05-15 VITALS
HEART RATE: 101 BPM | HEIGHT: 70 IN | DIASTOLIC BLOOD PRESSURE: 86 MMHG | SYSTOLIC BLOOD PRESSURE: 120 MMHG | WEIGHT: 246 LBS | BODY MASS INDEX: 35.22 KG/M2

## 2021-05-15 VITALS — HEART RATE: 88 BPM | BODY MASS INDEX: 37.82 KG/M2 | HEIGHT: 70 IN | WEIGHT: 264.19 LBS | OXYGEN SATURATION: 98 %

## 2021-05-15 VITALS — HEIGHT: 70 IN | BODY MASS INDEX: 35.25 KG/M2 | HEART RATE: 81 BPM | WEIGHT: 246.25 LBS | OXYGEN SATURATION: 99 %

## 2021-05-15 VITALS — WEIGHT: 264.12 LBS | HEART RATE: 84 BPM | BODY MASS INDEX: 37.81 KG/M2 | OXYGEN SATURATION: 98 % | HEIGHT: 70 IN

## 2021-05-15 VITALS — HEART RATE: 100 BPM | HEIGHT: 70 IN | OXYGEN SATURATION: 99 %

## 2021-05-16 VITALS
HEIGHT: 70 IN | WEIGHT: 277 LBS | SYSTOLIC BLOOD PRESSURE: 128 MMHG | HEART RATE: 60 BPM | BODY MASS INDEX: 39.65 KG/M2 | DIASTOLIC BLOOD PRESSURE: 82 MMHG

## 2021-05-16 VITALS — HEART RATE: 114 BPM | BODY MASS INDEX: 37.96 KG/M2 | HEIGHT: 70 IN | OXYGEN SATURATION: 98 % | WEIGHT: 265.12 LBS

## 2021-05-16 VITALS — OXYGEN SATURATION: 98 % | HEIGHT: 70 IN | BODY MASS INDEX: 37.54 KG/M2 | HEART RATE: 88 BPM | WEIGHT: 262.25 LBS

## 2021-05-16 VITALS — WEIGHT: 271.5 LBS | BODY MASS INDEX: 38.87 KG/M2 | HEART RATE: 73 BPM | OXYGEN SATURATION: 94 % | HEIGHT: 70 IN

## 2021-05-18 NOTE — PROGRESS NOTES
Yulissa Spaulding 1955     Office/Outpatient Visit    Visit Date: Wed, Jul 17, 2019 09:21 am    Provider: Rayne Gee MD (Assistant: Annmarie Hunter MA)    Location: [facility]        Electronically signed by Rayne Gee MD on  07/22/2019 09:21:33 AM                             SUBJECTIVE:        CC:     Mrs. Spaulding is a 63 year old White female.  This is a follow-up visit.  ongoing knee pain L knee, med refilled;         HPI:     Yulissa had L knee replacement over 3 months ago, and she is still in moderate pain with significant swelling of the L knee and she wants  a second opinion.  Her surgeon is Dr Justin and she has a f/u appt with him this Friday, she completed PT last week         In regard to the essential hypercholesterolemia, current treatment includes a low cholesterol/low fat diet and HAD TO QUIT THE STATIN DUE TO SE'S.  Compliance with treatment has been fair; she does not follow a diet and exercise regimen.  She denies experiencing any hypercholesterolemia related symptoms.          Acquired hypothyroidism details; this was first diagnosed >10 years ago.  She is currently taking Levothyroid, 175 mcg daily.  She cannot remember when a TSH was last checked.  The result was reported as normal.  She denies any related symptoms.  She reports no symptoms suggestive of adverse medication effect.      chronic atrial fibrillation and she is stable and well controlled         In regard to the hypertension, her current cardiac medication regimen includes a diuretic ( HCTZ ) and a beta-blocker.  Mrs. Spaulding does not check her blood pressure other than at her clinic appointments.  She is tolerating the medication well without side effects.  Compliance with treatment has been good; she takes her medication as directed.      chronic anxiety and she is stable on pristiq and lorazepam daily prn, she takes lorazepam once a day because she has so much stress with her knee pain and  swelling.  She is sleeping well at night, denies active depression symptoms.  No suicidal ideation.  She is functional on her medication, no signs of diversion or abuse         Concerning screening for depression,         PHQ-9 Depression Screening: Completed form scanned and in chart; Total Score 7 Alcohol Consumption Screening: Completed form scanned and in chart; Total Score 0     ROS:     CONSTITUTIONAL:  Negative for chills, fatigue and fever.      E/N/T:  Negative for nasal congestion, hoarseness and sore throat.      CARDIOVASCULAR:  Negative for chest pain, palpitations, tachycardia, orthopnea, and edema.      RESPIRATORY:  Negative for cough, dyspnea, and hemoptysis.      GASTROINTESTINAL:  Negative for abdominal pain, constipation, diarrhea, melena, nausea and vomiting.      MUSCULOSKELETAL:  Positive for arthralgias (L knee pain).      INTEGUMENTARY/BREAST:  Negative for rash.      NEUROLOGICAL:  Negative for dizziness and headaches.      PSYCHIATRIC:  Negative for anxiety, depression, and sleep disturbances.          PMH/FMH/SH:     Last Reviewed on 2019 09:44 AM by Rayne Gee    Past Medical History:             PAST MEDICAL HISTORY         Positive for    Hyperlipidemia and    Hypertension;     atherosclerosis     Positive for    Hypothyroidism;     Positive for    Allergies;     Positive for    benign breast lump;     Positive for    Depression and    Voice Box Dysfunction;         GYNECOLOGICAL HISTORY:     miscarriage 3    1 DNC         CURRENT MEDICAL PROVIDERS:    Cardiologist: Dr. Nolasco    Gastroenterologist: Dr. Eun Gasca    Rheumatologist: Dr. Nigel Webster         PREVENTIVE HEALTH MAINTENANCE             BONE DENSITY: was last done 10/16/17 with normal results     COLORECTAL CANCER SCREENING: Up to date (colonoscopy q10y; sigmoidoscopy q5y; Cologuard q3y) was last done 17, Results are in chart; colonoscopy with the following abnormalities noted-- Polyp(s) and  repeat 3 years     EYE EXAM: was last done ____ (enter month/year) >10 YEARS AGO     MAMMOGRAM: Done within last 2 years and results in are chart was last done 01- with normal results     PAP SMEAR: was last done 2017 with normal results         Surgical History:         Hernia Repair: umbilical;     GASTRIC SLEEVE SURGERY      Cholecystectomy    Hysterectomy: 2005, SUPRACERVICAL , OOPHERECTOMY BILATERAL;     DNC;    BENIGN BREAST MASS REMOVED LEFT;      EGD 11/2011 normal;    Bladder repair 3/2005;         Family History:         Mother: COPD     Positive for Coronary Artery Disease ( father; mother ), Hyperlipidemia ( father ) and Hypertension ( father ).          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          Alcohol: Frequency: Once a week         Substance Abuse History:     Last Reviewed on 10/19/2018 02:31 PM by Taylor Bunn        Mental Health History:     Last Reviewed on 10/19/2018 02:31 PM by Taylor Bunn        Communicable Diseases (eg STDs):     Last Reviewed on 10/19/2018 02:31 PM by Taylor Bunn            Allergies:     Last Reviewed on 4/12/2019 08:50 AM by Annmarie Hunter      No Known Drug Allergies.     Atorvastatin Calcium: (Adverse Reaction)        Current Medications:     Last Reviewed on 4/12/2019 08:54 AM by Annmarie Hunter    Hydrochlorothiazide (HCTZ) 25mg Tablet 1 tab daily     Levothyroxine Sodium 0.175mg Tablet Take 1 tablet(s) by mouth daily     Lorazepam 0.5mg Tablet 1 PO BID PRN     Singulair  10mg Tablet 1 tab daily     Vitamin D3 5,000IU Capsules 1 capsule every day     Breo Ellipta 200mcg/25mcg Inhalation Powder Take 1 inhalation(s) by mouth daily     Cartia XT 180mg Capsules, Extended Release Take 1 capsule(s) by mouth daily     Zyrtec 10mg Tablet 1 tab daily      Oxycodone/Acetaminophen  7.5mg/325mg Tablet 1 tab PO q4 hr PRN back pain     Eliquis 5mg Tablet take 1 tab BID     calcium chewables     OSTAFLEX FOR BONES ONCE DAILY         OBJECTIVE:        Vitals:         Current: 7/17/2019 9:33:49 AM    Ht:  5 ft, 9.5 in;  Wt: 265 lbs;  BMI: 38.6    T: 98.1 F (oral);  BP: 118/72 mm Hg (left arm, sitting);  P: 105 bpm (left arm (BP Cuff), sitting);  sCr: 0.78 mg/dL;  GFR: 96.78        Exams:     PHYSICAL EXAM:     GENERAL: vital signs recorded - well developed, well nourished;  no apparent distress;     EYES: extraocular movements intact; conjunctiva and cornea are normal; PERRLA;     E/N/T: OROPHARYNX:  normal mucosa, dentition, gingiva, and posterior pharynx;     NECK: range of motion is normal; thyroid is non-palpable;     RESPIRATORY: normal respiratory rate and pattern with no distress; normal breath sounds with no rales, rhonchi, wheezes or rubs;     CARDIOVASCULAR: normal rate; rhythm is regular;  no systolic murmur; no edema;     GASTROINTESTINAL: nontender; normal bowel sounds; no organomegaly;     MUSCULOSKELETAL: gait: affected by a left leg limp;  L knee-2+ edema, no erythema, poor extension and flexion, incision C/D/I;     NEUROLOGICAL:  cranial nerves, motor and sensory function, reflexes, gait and coordination are all intact;         Lab/Test Results:             Amphetamines Screen, Urin:  Negative (07/17/2019),     BAR-Barbiturates Screen, Urin:  Negative (07/17/2019),     Buprenorphine:  Negative (07/17/2019),     BZO-Benzodiazepines Screen,Ur:  Positive (07/17/2019),     Cocaine(Metab.)Screen, Ur:  Negative (07/17/2019),     MDMA-Ecstasy:  Negative (07/17/2019),     Met-Methamphetamine:  Negative (07/17/2019),     MTD-Methadone Screen, Urine:  Negative (07/17/2019),     Opiate Screen, Urine:  Negative (07/17/2019),     OXY-Oxycodone:  Positive (07/17/2019),     PCP-Phencyclidine Screen, Uri:  Negative (07/17/2019),     THC Cannabinoids Screen, Urin:   Negative (07/17/2019),     Urine temperature:  confirmed (07/17/2019),     Date and time of last pill:  lorazepam 7/17/19 @ 0700 & gabapentin 7/17/19 @ 0700  /mnp (07/17/2019),     Performed by:  tls (07/17/2019),     Collection Time:  1005 (07/17/2019),             ASSESSMENT           V43.65   Z96.659  Artificial joint replacement, Knee              DDx:     530.81   K21.9  GERD              DDx:     268.9   E55.9  Vitamin D deficiency, unspecified              DDx:     272.0   E78.00  Essential hypercholesterolemia              DDx:     244.8   E03.8  Acquired hypothyroidism              DDx:     427.31   I48.91  Atrial fibrillation              DDx:     724.2   M54.5  Chronic low back pain              DDx:     278.01   Z68.31  BMI >30%              DDx:     401.1   I10  Hypertension              DDx:     300.02   F41.9  Anxiety              DDx:     V58.69   Z79.899  Use of high risk medications              DDx:     V79.0   Z13.89  Screening for depression              DDx:     V79.0   Z13.89  Screening for depression              DDx:         ORDERS:         Meds Prescribed:       Diclofenac Sodium 1% Topical Gel Apply 2 gm(s) to affected area qid prn pain  #100 (One [place]) gm Refills: 1       Refill of: Lorazepam 0.5mg Tablet 1 PO BID PRN  #30 (Thirty) tablet(s) Refills: 2         Lab Orders:       [id number]  Drug test prsmv read direct optical obs pr date  (In-House)         37630  BDCBC [facility] CBC with 3 part diff  (Send-Out)         39303  NUSCR - [facility] C-reactive protein CRP  (Send-Out)         73780  SED - [facility] Sedimentation rate, non-automated ESR  (Send-Out)         77984  URIC - [facility] Uric Acid, Serum  (Send-Out)                   PLAN:          Artificial joint replacement, Knee she is to see Dr Justin, if he does not treat her for her ongoing pain then I will refer her for a second opinion recommend compression stockings-but she says it made her worse, so I will try her on diclofenac topical (she is  on eliquis so no oral NSAIDs) and an ace bandage was given to her today.     LABORATORY:  Labs ordered to be performed today include CBC, CRP, Quantitative, ESR, and uric acid.            Prescriptions:       Diclofenac Sodium 1% Topical Gel Apply 2 gm(s) to affected area qid prn pain  #100 (One [place]) gm Refills: 1           Orders:       21923  BDCBC - [facility] CBC with 3 part diff  (Send-Out)         76915  NUSCR - [facility] C-reactive protein CRP  (Send-Out)         64590  SED - [facility] Sedimentation rate, non-automated ESR  (Send-Out)         31801  URIC - [facility] Uric Acid, Serum  (Send-Out)            GERD stable          Vitamin D deficiency, unspecified stable on 5000 units a day          Essential hypercholesterolemia labs in April, well controlled          Acquired hypothyroidism well controlled on levothyroxine 175 mcg daily          Atrial fibrillation chronic afib and she is stable and in NSR, on cartia and eliquis          Chronic low back pain ongoing but stable on gabapentin          BMI >30% recommend diet and exercise          Hypertension stable and well controlled          Anxiety stable on lorazepam, med refilled           Prescriptions:       Refill of: Lorazepam 0.5mg Tablet 1 PO BID PRN  #30 (Thirty) tablet(s) Refills: 2          Use of high risk medications         RECOMMENDATIONS given include: divya reviewed, drug screen performed and appropriate, consent is reviewed and signed and on the chart, she is aware of risk of addiction on this medication and understands that she will need to follow up for a review every 3 months and her medications will be adjusted or decreased as deemed appropriate at each visit.  No personal history of drug or alcohol abuse.  No concerns about diversion or abuse.  She denies side effects related to the medication.  She is  aware that she may be called in for pill counts..            Orders:       30631  Drug test prsmv read direct optical obs pr date  (In-House)                CHARGE CAPTURE           **Please note: ICD descriptions below are intended for billing purposes only and may not represent clinical diagnoses**        Primary Diagnosis:         V43.65 Artificial joint replacement, Knee            Z96.659    Presence of unspecified artificial knee joint              Orders:          17526   Office/outpatient visit; established patient, level 4  (In-House)           530.81 GERD            K21.9    Gastro-esophageal reflux disease without esophagitis    268.9 Vitamin D deficiency, unspecified            E55.9    Vitamin D deficiency, unspecified    272.0 Essential hypercholesterolemia            E78.00    Pure hypercholesterolemia, unspecified    244.8 Acquired hypothyroidism            E03.8    Other specified hypothyroidism    427.31 Atrial fibrillation            I48.91    Unspecified atrial fibrillation    724.2 Chronic low back pain            M54.5    Low back pain    278.01 BMI >30%            Z68.31    Body mass index (BMI) 31.0-31.9, adult    401.1 Hypertension            I10    Essential (primary) hypertension    300.02 Anxiety            F41.9    Anxiety disorder, unspecified    V58.69 Use of high risk medications            Z79.899    Other long term (current) drug therapy              Orders:          16748   Drug test prsmv read direct optical obs pr date  (In-House)           V79.0 Screening for depression            Z13.89    Encounter for screening for other disorder    V79.0 Screening for depression            Z13.89    Encounter for screening for other disorder

## 2021-05-18 NOTE — PROGRESS NOTES
Yulissa Spaulding  1955     Office/Outpatient Visit    Visit Date: Mon, Nov 16, 2020 03:10 pm    Provider: Rayne Gee MD (Assistant: Haley Friedn LPN)    Location: [facility]        Electronically signed by Rayne Gee MD on  11/18/2020 09:30:43 AM                             Subjective:        CC: Mrs. Spaulding is a 65 year old White female.  She is here today following a transition of care from an inpatient hospital: [facility] for Left knee revision. The patient was admitted on 11/6/2020 and d/c'd 11/8/2020. Our office called the patient within 48 hours of discharge and scheduled the follow-up appointment.. During the patient's hospital stay the patient was treated by Dr. Biswas.  [employer] [phone];         HPI:       Yulissa is in today for her chronic knee pain s/p TKR L knee with her being dx with an allergy to the cement in the joint.  Today her SIN for having that joint re replaced.  She is overall doing ok.  Her pain is moderate to severe, constant and daily.  She is sleeping better with her brace that immobilizes her L knee at night.  Her surgeon is Dr Biswas,  she has home health and is getting PT and it is rough.  She is on a blood thinner eliquis for DVT prophylaxis.  She is starting to eat ok and she is ambulatory with a walker.  No urinary incontinence.  Her BM are ok.  Her pain is controlled with hydrocodone but her pain is severe without meds-she has a deep ache in her bone.  Her moods are doing ok with buspar and pristiq and she is sleeping ok with pain meds.  She has zofran for nausea.        Her BP are well controlled          Concerning essential (primary) hypertension, her current cardiac medication regimen includes a diuretic ( HCTZ ) and a beta-blocker.  Compliance with treatment has been good; she takes her medication as directed.  She is tolerating the medication well without side effects.  Mrs. Spaulding does not check her blood pressure other than at  her clinic appointments.        she has recent onset of afib that delayed her last surgery, she is in NSR and doing ok, she is on cartia and eliquis    ROS:     CONSTITUTIONAL:  Positive for fatigue.   Negative for chills or fever.      E/N/T:  Negative for nasal congestion, hoarseness and sore throat.      CARDIOVASCULAR:  Negative for chest pain, palpitations, tachycardia, orthopnea, and edema.      RESPIRATORY:  Negative for cough, dyspnea, and hemoptysis.      GASTROINTESTINAL:  Negative for abdominal pain, constipation, diarrhea, melena, nausea and vomiting.      MUSCULOSKELETAL:  Positive for arthralgias (L knee pain).      INTEGUMENTARY/BREAST:  Negative for rash.      NEUROLOGICAL:  Negative for dizziness and headaches.      PSYCHIATRIC:  Negative for anxiety, depression, and sleep disturbances.          Past Medical History / Family History / Social History:         Last Reviewed on 2020 03:20 PM by Rayne Gee    Past Medical History:             PAST MEDICAL HISTORY         Positive for    Hyperlipidemia and    Hypertension;     atherosclerosis     Positive for    Hypothyroidism;     Positive for    Allergies;     Positive for    benign breast lump;     Positive for    Depression and    Voice Box Dysfunction;         GYNECOLOGICAL HISTORY:     miscarriage 3    1 DNC         CURRENT MEDICAL PROVIDERS:    Cardiologist: Dr. Nolasco    Gastroenterologist: Dr. Eun Gasca    Orthopedist: Dr Braxton Crawford MD ([facility])    Rheumatologist: Dr. Nigel Webster         PREVENTIVE HEALTH MAINTENANCE             BONE DENSITY: was last done 2020 with the following abnormality noted-- Bone density in the femoral necks has decreased by 6.6 percent compared to 2017     COLORECTAL CANCER SCREENING: Up to date (colonoscopy q10y; sigmoidoscopy q5y; Cologuard q3y) was last done 2020, Results are in chart; colonoscopy with the following abnormalities noted-- Polyp(s) and repeat 3 years     EYE  EXAM: was last done ____ (enter month/year) >10 YEARS AGO     MAMMOGRAM: Done within last 2 years and results in are chart was last done 01- with normal results     PAP SMEAR: was last done 01/04/2019 with normal results         Surgical History:         Hernia Repair: umbilical;     GASTRIC SLEEVE SURGERY     Cholecystectomy    Hysterectomy: 2005, SUPRACERVICAL , OOPHERECTOMY BILATERAL;     DNC;    BENIGN BREAST MASS REMOVED LEFT;     EGD 11/2011 normal;    Bladder repair 3/2005;         Family History:         Mother: COPD     Positive for Coronary Artery Disease ( father; mother ), Hyperlipidemia ( father ) and Hypertension ( father ).          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          Alcohol: Frequency: Once a week         Substance Abuse History:     Last Reviewed on 10/19/2018 02:31 PM by Taylor Bunn        Mental Health History:     Last Reviewed on 10/19/2018 02:31 PM by Taylor Bunn        Communicable Diseases (eg STDs):     Last Reviewed on 10/19/2018 02:31 PM by Taylor Bunn        Allergies:     Last Reviewed on 10/13/2020 12:11 PM by Verena Espinal    Atorvastatin Calcium:   (Adverse Reaction)    band aides:      methyl methacrylate:      hydroxyethly methacrylate:      ethyl acrylate:      Bone Cement:          Current Medications:     Last Reviewed on 10/13/2020 12:11 PM by Verena Espinal    HYDROcodone-acetaminophen 7.5-325 mg oral tablet [take 1 tablet by oral route every 4 hours as needed for pain]    ondansetron HCL 4 mg oral tablet [take 1 tablet (4 mg) by oral route every 6 hours prn]    Pristiq 100 mg oral Tablet, Extended Release 24 hr [Take 1 tablet(s) by mouth daily]    hydroCHLOROthiazide 25 mg oral tablet [1 tab daily]    Singulair 10 mg oral tablet [1 tab daily]    Zyrtec  10 mg oral tablet [1 tab daily]    cholecalciferol (vitamin D3) 125 mcg (5,000 unit) oral capsule [1 capsule every day]    diclofenac sodium 1 % Topical Gel [Apply 2 gm(s) to affected area qid prn pain]    calcium chewables     Breo Ellipta 200-25 mcg/dose Inhalation Blister, With Inhalation Device [Take 1 inhalation(s) by mouth daily]    Cartia  mg oral Capsule, Extended Release 24 hr [Take 1 capsule(s) by mouth daily]    Eliquis 5 mg oral tablet [take 1 tab BID]    Melatonin 5 mg oral tablet [4 tabs HS]    busPIRone 15 mg oral tablet [Take 1 tablet(s) by mouth bid]    levothyroxine 150 mcg oral tablet [take 1 tablet (150 mcg) by oral route once daily]        Objective:        Vitals:         Current: 11/16/2020 3:37:11 PM    Ht:  5 ft, 9.5 inT: 98 F (temporal);  BP: 130/70 mm Hg (right arm, sitting);  R: 18 bpm;  sCr: 0.78 mg/dL;  GFR: 69.69        Exams:     PHYSICAL EXAM:     GENERAL:  well developed and nourished; appropriately groomed; in no apparent distress;     EYES: extraocular movements intact; conjunctiva and cornea are normal;     RESPIRATORY: normal respiratory rate and pattern with no distress;     MUSCULOSKELETAL: normal overall tone     NEUROLOGIC: mental status: alert and oriented x 3;     PSYCHIATRIC: appropriate affect and demeanor; normal psychomotor function; normal speech pattern;         Assessment:         M25.562   Pain in left knee       Z96.652   Presence of left artificial knee joint       F32.0   Major depressive disorder, single episode, mild       I10   Essential (primary) hypertension       F41.9   Anxiety disorder, unspecified       M54.5   Low back pain       I48.91   Unspecified atrial fibrillation       K21.9   Gastro-esophageal reflux disease without esophagitis           Plan:         Pain in left knee  she has home health and is getting PT and it is rough.  She is on a blood thinner eliquis for DVT prophylaxis.  She is starting to eat ok and she is ambulatory with a  walker.  No urinary incontinence.  Her BM are ok.  Her pain is controlled with hydrocodone but her pain is severe without meds-she has a deep ache in her bone.  Her moods are doing ok with buspar and pristiq and she is sleeping ok with pain meds.  She has zofran for nausea.            Telehealth: Verbal consent obtained for visit to occur via televideo conferencing; Staff, other than provider, present during telephone visit include only Dr Goode was present during the telehealth OV with patient         Presence of left artificial knee jointwith new knee joint        Major depressive disorder, single episode, mildoverall she is doing ok        Essential (primary) hypertensionstable        Anxiety disorder, unspecifiedoverall she is doing ok        Low back painstable and doing ok        Unspecified atrial fibrillationstable, she may need me to refill her eliquis        Gastro-esophageal reflux disease without esophagitisstable            Charge Capture:         Primary Diagnosis:     M25.562  Pain in left knee           Orders:      64197  Transitional care manage service 14 day discharge  (In-House)              Z96.652  Presence of left artificial knee joint     F32.0  Major depressive disorder, single episode, mild     I10  Essential (primary) hypertension     F41.9  Anxiety disorder, unspecified     M54.5  Low back pain     I48.91  Unspecified atrial fibrillation     K21.9  Gastro-esophageal reflux disease without esophagitis         ADDENDUMS:      ____________________________________    Addendum: 11/23/2020 04:40 PM - Rayne Goode        Remove  88547 - TCM of Moderate Complexity level within 14 days of discharge) - unable to bill this     TCM code due to communication w/patient and/or caregiver was not done within 2 business days of     discharge.            Addendum: 11/30/2020 12:43 PM - Rayne Goode        Remove  [id number] andn change to [id number] code.[facility]

## 2021-05-18 NOTE — PROGRESS NOTES
Yulissa Spaulding 1955     Office/Outpatient Visit    Visit Date:  11:01 am    Provider: Rayne Gee MD (Assistant: Ksenia Gale RN)    Location: [facility]        Electronically signed by Rayne Gee MD on  2018 12:36:33 PM                             SUBJECTIVE:        CC: c/o joint pain and stiffness all over         HPI:     Yulissa is following up for her counseling on diet and exercise and she is not doing well with diet or exercise, and she cant afford any of the diet pills xenical nor contrave.     Anxiety is ongoing,  she is on pristiq and lorazepam prn     chronic LBP and joint pain is worse-diffuse and she feels swollen and stiff all over. Sx onset in joint 3 weeks ago and getting worse, no meds tried other than tramadol which was old from last year for her LBP.  If she gets on the floor she cant stand up..     ROS:     CONSTITUTIONAL:  Positive for fatigue.   Negative for chills or fever.      E/N/T:  Negative for ear pain and sore throat.      CARDIOVASCULAR:  Negative for chest pain, orthopnea, paroxysmal nocturnal dyspnea and pedal edema.      RESPIRATORY:  Negative for dyspnea.      GASTROINTESTINAL:  Negative for abdominal pain, constipation, diarrhea, nausea and vomiting.      PSYCHIATRIC:  Negative for anxiety, depression, and sleep disturbances.          PMH/FMH/SH:     Last Reviewed on 2018 11:26 AM by Rayne Gee    Past Medical History:             PAST MEDICAL HISTORY         Positive for    Hyperlipidemia and    Hypertension;     atherosclerosis    allergies     Positive for    Hypothyroidism;     Positive for    benign breast lump;         GYNECOLOGICAL HISTORY:     miscarriage 3    1 DNC     DEPRESSION    VOICE BOX DYSFUNCTION HTN    ALLERGIC RHINITIS         PREVENTIVE HEALTH MAINTENANCE             BONE DENSITY: was last done 2015 with normal results     MAMMOGRAM: Done within last 2 years and results in are chart was  last done 5/2016 with the following abnormalaties noted-- normal after diagonistic images         Surgical History:         Hernia Repair: umbilical;     GASTRIC SLEEVE SURGERY      Cholecystectomy    Hysterectomy: 2005, SUPRACERVICAL , OOPHERECTOMY BILATERAL;     DNC;    BENIGN BREAST MASS REMOVED LEFT;      EGD 11/2011 normal;    Bladder repair 3/2005;         Family History:         Mother: COPD     Positive for Coronary Artery Disease ( father; mother ), Hyperlipidemia ( father ) and Hypertension ( father ).          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          Alcohol: Frequency: Once a week         Substance Abuse History:     Last Reviewed on 10/03/2017 01:52 PM by Misael Hirsch        Mental Health History:     Last Reviewed on 10/03/2017 01:52 PM by Misael Hirsch        Communicable Diseases (eg STDs):     Last Reviewed on 10/03/2017 01:52 PM by Misael Hirsch            Allergies:     Last Reviewed on 4/11/2018 11:06 AM by Ksenia Gale      No Known Drug Allergies.     Atorvastatin Calcium: (Adverse Reaction)        Current Medications:     Last Reviewed on 4/11/2018 11:07 AM by Ksenia Gale    Hydrochlorothiazide (HCTZ) 25mg Tablet 1 tab daily     Lorazepam 0.5mg Tablet 1 PO BID PRN     Singulair  10mg Tablet 1 tab daily     Vitamin D3 5,000IU Capsules 1 capsule every day     Levothyroxine Sodium 150mcg Capsules Take 1 capsule(s) by mouth daily     Fluticasone Propionate 50mcg/1actuation Nasal Spray 1 or 2 sprays in each nostril qday prn     Zyrtec 10mg Tablet 1 tab daily     Aspirin (ASA) 81mg Tablets, Enteric Coated 1 tab daily     calcium chewables     OSTAFLEX FOR BONES ONCE DAILY         OBJECTIVE:        Vitals:         Current: 4/11/2018 11:05:31 AM    Ht:  5 ft, 9.5 in;  Wt: 269  lbs;  BMI: 39.2    T: 97.6 F (oral);  BP: 132/87 mm Hg (left arm, sitting);  P: 85 bpm (left arm (BP Cuff), sitting);  sCr: 0.66 mg/dL;  GFR: 116.59        Exams:     PHYSICAL EXAM:     GENERAL: vital signs recorded - well developed, well nourished;  no apparent distress;     E/N/T: OROPHARYNX:  normal mucosa, dentition, gingiva, and posterior pharynx;     NECK: range of motion is normal; thyroid is non-palpable;     RESPIRATORY: normal respiratory rate and pattern with no distress; normal breath sounds with no rales, rhonchi, wheezes or rubs;     CARDIOVASCULAR: normal rate; rhythm is regular;  no systolic murmur; trace pedal and 2+ pedal edema;     GASTROINTESTINAL: nontender, nondistended; no hepatosplenomegaly or masses; no bruits;     MUSCULOSKELETAL: gait: slowed; diffuse joint stiffness without erythema or edema or deformity of the joints.  Neg straight leg raise, 5/5 MS strength in LE B with normal symm sensory exam LE B         ASSESSMENT           724.2   M54.5  Chronic low back pain              DDx:     719.49   M15.0  Diffuse arthralgia              DDx:     244.8   E03.8  Acquired hypothyroidism              DDx:     300.02   F41.9  Anxiety              DDx:     401.1   I10  Hypertension              DDx:     V65.3   Z71.3  Weight loss program, follow-up              DDx:         ORDERS:         Meds Prescribed:       Celebrex (Celecoxib) 200mg Capsules 1 tab daily  #90 (Ninety) capsule(s) Refills: 0       Tizanidine HCl 2mg Capsules take 1 tablet by mouth three times a day as needed  #60 (Sixty) capsule(s) Refills: 0         Lab Orders:       [id number]  TSH - [facility] TSH  (Send-Out)         [id number]  RAPII - [facility] Arthritis Profile  (Send-Out)                   PLAN:          Chronic low back pain will try her on tizanidine and celebrex           Prescriptions:       Celebrex (Celecoxib) 200mg Capsules 1 tab daily  #90 (Ninety) capsule(s) Refills: 0       Tizanidine HCl 2mg Capsules take 1 tablet by mouth three  times a day as needed  #60 (Sixty) capsule(s) Refills: 0          Diffuse arthralgia progressively  worsening-will check an arthritis panel     LABORATORY:  Labs ordered to be performed today include Arthritis Profile.            Orders:       [id number]  RAPII - [facility] Arthritis Profile  (Send-Out)            Acquired hypothyroidism last TSH was high-levothyroxine was increased, will recheck labs today     LABORATORY:  Labs ordered to be performed today include TSH.            Orders:       [id number]  TSH - [facility] TSH  (Send-Out)            Anxiety stable on meds          Hypertension stable          Weight loss program, follow-up continue trying low carb diet and exercise             Patient Recommendations:        For  Diffuse arthralgia:     I also recommend ^.              CHARGE CAPTURE           **Please note: ICD descriptions below are intended for billing purposes only and may not represent clinical diagnoses**        Primary Diagnosis:         724.2 Chronic low back pain            M54.5    Low back pain              Orders:          96121   Office/outpatient visit; established patient, level 4  (In-House)           719.49 Diffuse arthralgia            M15.0    Primary generalized (osteo)arthritis    244.8 Acquired hypothyroidism            E03.8    Other specified hypothyroidism    300.02 Anxiety            F41.9    Anxiety disorder, unspecified    401.1 Hypertension            I10    Essential (primary) hypertension    V65.3 Weight loss program, follow-up            Z71.3    Dietary counseling and surveillance        ADDENDUMS:      ____________________________________    Date: 04/16/2018 12:29 PM    Author: Sarai Velarde         Visit Note Faxed to:        Daly Mueller  (Rheumatology); Number [phone]

## 2021-05-18 NOTE — PROGRESS NOTES
Yulissa Spaulding 1955     Office/Outpatient Visit    Visit Date: Fri, Apr 12, 2019 08:41 am    Provider: Rayne Gee MD (Assistant: Annmarie Hunter MA)    Location: [facility]        Electronically signed by Rayne Gee MD on  04/18/2019 04:16:52 PM                             SUBJECTIVE:        CC: knee pain         HPI:         Mrs. Spaulding presents with hypertension.  She is not using any nonpharmacologic treatment modalities.  Her current cardiac medication regimen includes a diuretic ( HCTZ ).  Mrs. Spaulding does not check her blood pressure other than at her clinic appointments.  She is tolerating the medication well without side effects.  Compliance with treatment has been good; she takes her medication as directed.      Yulissa is s/p L TKR on 3/27/19 with Dr Justin and she is still in a lot of pain with knee swelling and difficulty stretching her L knee, she is getting PT.  Yesterday she had her staples removed and she has been wearing her compression stockings until yesterday.  She saw Dr Justin yesterday, too, no signs of infection.  She is really upset today and tearful about how much pain she is in.  She states she will never have this surgery again.         With regard to the essential hypercholesterolemia, current treatment includes a low cholesterol/low fat diet and HAD TO QUIT THE STATIN DUE TO SE'S.  Compliance with treatment has been fair; she does not follow a diet and exercise regimen.  She denies experiencing any hypercholesterolemia related symptoms.          Additionally, she presents with history of acquired hypothyroidism.  this was first diagnosed >10 years ago.  She is currently taking Levothyroid, 175 mcg daily.  She cannot remember when a TSH was last checked.  The result was reported as normal.  She denies any related symptoms.  She reports no symptoms suggestive of adverse medication effect.      chronic afib and she is stable and in NSR, on  cartia and eliquis         In regard to the hypertension, she is not using any nonpharmacologic treatment modalities.  Her current cardiac medication regimen includes a diuretic ( HCTZ ).  Mrs. Spaulding does not check her blood pressure other than at her clinic appointments.  She is tolerating the medication well without side effects.  Compliance with treatment has been good; she takes her medication as directed.      ROS:     CONSTITUTIONAL:  Negative for chills, fatigue and fever.      E/N/T:  Negative for nasal congestion, hoarseness and sore throat.      CARDIOVASCULAR:  Negative for chest pain, palpitations, tachycardia, orthopnea, and edema.      RESPIRATORY:  Negative for cough, dyspnea, and hemoptysis.      GASTROINTESTINAL:  Negative for abdominal pain, constipation, diarrhea, melena, nausea and vomiting.      MUSCULOSKELETAL:  Positive for arthralgias (L knee pain).      INTEGUMENTARY/BREAST:  Negative for rash.      NEUROLOGICAL:  Negative for dizziness and headaches.      PSYCHIATRIC:  Negative for anxiety, depression, and sleep disturbances.          PMH/FMH/SH:     Last Reviewed on 2019 09:22 AM by Rayne Gee    Past Medical History:             PAST MEDICAL HISTORY         Positive for    Hyperlipidemia and    Hypertension;     atherosclerosis     Positive for    Hypothyroidism;     Positive for    Allergies;     Positive for    benign breast lump;     Positive for    Depression and    Voice Box Dysfunction;         GYNECOLOGICAL HISTORY:     miscarriage 3    1 DNC         CURRENT MEDICAL PROVIDERS:    Cardiologist: Dr. Nolasco    Gastroenterologist: Dr. Eun Gasca    Rheumatologist: Dr. Nigel Webster         PREVENTIVE HEALTH MAINTENANCE             BONE DENSITY: was last done 10/16/17 with normal results     COLORECTAL CANCER SCREENING: Up to date (colonoscopy q10y; sigmoidoscopy q5y; Cologuard q3y) was last done 17, Results are in chart; colonoscopy with the following  abnormalities noted-- Polyp(s) and repeat 3 years     EYE EXAM: was last done ____ (enter month/year) >10 YEARS AGO     MAMMOGRAM: Done within last 2 years and results in are chart was last done 01- with normal results     PAP SMEAR: was last done 2017 with normal results         Surgical History:         Hernia Repair: umbilical;     GASTRIC SLEEVE SURGERY      Cholecystectomy    Hysterectomy: 2005, SUPRACERVICAL , OOPHERECTOMY BILATERAL;     DNC;    BENIGN BREAST MASS REMOVED LEFT;      EGD 11/2011 normal;    Bladder repair 3/2005;         Family History:         Mother: COPD     Positive for Coronary Artery Disease ( father; mother ), Hyperlipidemia ( father ) and Hypertension ( father ).          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          Alcohol: Frequency: Once a week         Substance Abuse History:     Last Reviewed on 10/19/2018 02:31 PM by Taylor Bunn        Mental Health History:     Last Reviewed on 10/19/2018 02:31 PM by Taylor Bunn        Communicable Diseases (eg STDs):     Last Reviewed on 10/19/2018 02:31 PM by Taylor Bunn            Allergies:     Last Reviewed on 1/04/2019 08:58 AM by Spurling, Sarah C      No Known Drug Allergies.     Atorvastatin Calcium: (Adverse Reaction)        Current Medications:     Last Reviewed on 1/04/2019 09:09 AM by Spurling, Sarah C    Breo Ellipta 200mcg/25mcg Inhalation Powder Take 1 inhalation(s) by mouth daily     Fluticasone Propionate 50mcg/1actuation Nasal Spray 1 or 2 sprays in each nostril qday prn     Hydrochlorothiazide (HCTZ) 25mg Tablet 1 tab daily     Lorazepam 0.5mg Tablet 1 PO BID PRN     Singulair  10mg Tablet 1 tab daily     Vitamin D3 5,000IU Capsules 1 capsule every day     Cartia XT 180mg Capsules, Extended Release Take 1  capsule(s) by mouth daily     Zyrtec 10mg Tablet 1 tab daily     Aspirin (ASA) 81mg Tablets, Enteric Coated 1 tab daily     Eliquis 5mg Tablet Take 2 BID     Cyclobenzaprine HCl 10mg Tablet 1 tablet TID prn     calcium chewables     OSTAFLEX FOR BONES ONCE DAILY         OBJECTIVE:        Vitals:         Current: 4/12/2019 8:56:57 AM    Ht:  5 ft, 9.5 in;  Wt: 265 lbs;  BMI: 38.6    T: 97.4 F (oral);  BP: 112/76 mm Hg (left arm, sitting);  P: 113 bpm (left arm (BP Cuff), sitting);  sCr: 0.64 mg/dL;  GFR: 117.95        Repeat:     9:45:14 AM     P:   92bpm        Exams:     PHYSICAL EXAM:     GENERAL: vital signs recorded - well developed, well nourished;  no apparent distress;     EYES: extraocular movements intact; conjunctiva and cornea are normal; PERRLA;     E/N/T: EARS:  normal external auditory canals and tympanic membranes;  grossly normal hearing; OROPHARYNX: oral mucosa reveals dryness;  posterior pharynx, including tonsils, tongue, and uvula are normal;     NECK: range of motion is normal; thyroid is non-palpable;     RESPIRATORY: normal respiratory rate and pattern with no distress; normal breath sounds with no rales, rhonchi, wheezes or rubs;     CARDIOVASCULAR: normal rate; rhythm is regular;  no systolic murmur; no edema;     GASTROINTESTINAL: nontender; normal bowel sounds; no organomegaly;     MUSCULOSKELETAL: gait: affected by a left leg limp;  L knee-2+ edema, no erythema, poor extension and flexion, incision C/D/I;     NEUROLOGICAL:  cranial nerves, motor and sensory function, reflexes, gait and coordination are all intact;     PSYCHIATRIC: affect/demeanor: depressed, tearful;  normal thought and perception;         Lab/Test Results:             Amphetamines Screen, Urin:  Negative (04/12/2019),     BAR-Barbiturates Screen, Urin:  Negative (04/12/2019),     Buprenorphine:  Negative (04/12/2019),     BZO-Benzodiazepines Screen,Ur:  Positive (04/12/2019),     Cocaine(Metab.)Screen, Ur:  Negative  (04/12/2019),     MDMA-Ecstasy:  Negative (04/12/2019),     Met-Methamphetamine:  Negative (04/12/2019),     MTD-Methadone Screen, Urine:  Negative (04/12/2019),     Opiate Screen, Urine:  Negative (04/12/2019),     OXY-Oxycodone:  Positive (04/12/2019),     PCP-Phencyclidine Screen, Uri:  Negative (04/12/2019),     THC Cannabinoids Screen, Urin:  Negative (04/12/2019),     Urine temperature:  confirmed (04/12/2019),     Date and time of last pill:  lorazepam 4/12/19 0800  /mnp (04/12/2019),     Performed by:  tls (04/12/2019),     Collection Time:  0935 (04/12/2019),             ASSESSMENT           Benign essential hypertension    401.1   I10  Hypertension              DDx:     719.46   M25.562  Knee pain              DDx:     V43.65   Z96.659  Artificial joint replacement, Knee              DDx:     780.52   G47.00  Insomnia              DDx:     296.21   F33.0  mild depression              DDx:     V58.69   Z79.899  Use of high risk medications              DDx:     530.81   K21.9  GERD              DDx:     268.9   E55.9  Vitamin D deficiency, unspecified              DDx:     272.0   E78.00  Essential hypercholesterolemia              DDx:     244.8   E03.8  Acquired hypothyroidism              DDx:     427.31   I48.91  Atrial fibrillation              DDx:         ORDERS:         Meds Prescribed:       Refill of: Lorazepam 0.5mg Tablet 1 PO BID PRN  #30 (Thirty) tablet(s) Refills: 2       Celebrex (Celecoxib) 200mg Capsules 1 tab daily  #14 (Fourteen) capsule(s) Refills: 0       Omeprazole 20mg Capsules, Extended Release 1 capsule daily  #14 (Fourteen) capsule(s) Refills: 0       Refill of: Levothyroxine Sodium 0.175mg Tablet Take 1 tablet(s) by mouth daily  #90 (Ninety) tablet(s) Refills: 1       Refill of: Hydrochlorothiazide (HCTZ) 25mg Tablet 1 tab daily  #90 (Ninety) tablet(s) Refills: 1       Refill of: Singulair  (Montelukast Sodium) 10mg Tablet 1 tab daily  #90 (Ninety) tablet(s) Refills: 1        Refill of: Vitamin D3 5,000IU Capsules 1 capsule every day  #100 (One [place]) capsule(s) Refills: 1       Refill of: Pristiq (Desvenlafaxine) 100mg Tablets, Extended Release Take 1 tablet(s) by mouth daily  #90 (Ninety) tablet(s) Refills: 1         Lab Orders:       [id number]  Drug test prsmv read direct optical obs pr date  (In-House)         APPTO  Appointment need  (In-House)         12060  VITD - [facility] Vitamin D, 25 Hydroxy  (Send-Out)         45639  HTNLP - [facility] CMP AND LIPID: [id number]  (Send-Out)         31013  TSH - [facility] TSH  (Send-Out)                   PLAN:          Hypertension stable and well controlled sinus tachycardia due to pain she is to push fluids           Prescriptions:       Refill of: Hydrochlorothiazide (HCTZ) 25mg Tablet 1 tab daily  #90 (Ninety) tablet(s) Refills: 1       Refill of: Singulair  (Montelukast Sodium) 10mg Tablet 1 tab daily  #90 (Ninety) tablet(s) Refills: 1       Refill of: Vitamin D3 5,000IU Capsules 1 capsule every day  #100 (One [place]) capsule(s) Refills: 1       Refill of: Pristiq (Desvenlafaxine) 100mg Tablets, Extended Release Take 1 tablet(s) by mouth daily  #90 (Ninety) tablet(s) Refills: 1          Knee pain moderate to severe          Artificial joint replacement, Knee start ace wrap, and celebrex 200 mg for 2 weeks only-pt aware of increased GI bleeding risk and bruising due to eliquis use, cont ice and PT.  BM are normal, she is eating OK, not sleeping well           Prescriptions:       Celebrex (Celecoxib) 200mg Capsules 1 tab daily  #14 (Fourteen) capsule(s) Refills: 0          Insomnia try melatonin for her sleep          mild depression worse due to her pain, she is on pristiq and has been doing well on her meds, she needs her lorazepam refilled.           Prescriptions:       Refill of: Lorazepam 0.5mg Tablet 1 PO BID PRN  #30 (Thirty) tablet(s) Refills: 2          Use of high risk medications         FOLLOW-UP: Schedule a follow-up visit in 3 months..    Chronic visit follow up           Orders:       [id number]  Drug test prsmv read direct optical obs pr date  (In-House)         APPTO  Appointment need  (In-House)            GERD           Prescriptions:       Omeprazole 20mg Capsules, Extended Release 1 capsule daily  #14 (Fourteen) capsule(s) Refills: 0       Refill of: Levothyroxine Sodium 0.175mg Tablet Take 1 tablet(s) by mouth daily  #90 (Ninety) tablet(s) Refills: 1          Vitamin D deficiency, unspecified     LABORATORY:  Labs ordered to be performed today include Vitamin D.            Orders:       [id number]  VITD - [facility] Vitamin D, 25 Hydroxy  (Send-Out)            Essential hypercholesterolemia     LABORATORY:  Labs ordered to be performed today include HTN/Lipid Panel: CMP, Lipid.            Orders:       [id number]  HTNLP - [facility] CMP AND LIPID: [id number], [id number]  (Send-Out)            Acquired hypothyroidism due for labs, stable on meds     LABORATORY:  Labs ordered to be performed today include TSH.            Orders:       [id number]  TSH - [facility] TSH  (Send-Out)            Atrial fibrillation stable and in NSR             Patient Recommendations:        For  Use of high risk medications:     Schedule a follow-up visit in 3 months.                APPOINTMENT INFORMATION:        Monday Tuesday Wednesday Thursday Friday Saturday Sunday            Time:___________________AM  PM   Date:_____________________             CHARGE CAPTURE           **Please note: ICD descriptions below are intended for billing purposes only and may not represent clinical diagnoses**        Primary Diagnosis:         Benign essential hypertension    401.1 Hypertension            I10    Essential (primary) hypertension              Orders:          [id number]   Office/outpatient visit; established patient, level 4  (In-House)           719.46 Knee pain            M25.562    Pain in left knee    V43.65 Artificial joint replacement, Knee            Z96.659    Presence of unspecified artificial knee joint     780.52 Insomnia            G47.00    Insomnia, unspecified    296.21 mild depression            F33.0    Major depressive disorder, recurrent, mild    V58.69 Use of high risk medications            Z79.899    Other long term (current) drug therapy              Orders:          [id number]   Drug test prsmv read direct optical obs pr date  (In-House)             APPTO   Appointment need  (In-House)           530.81 GERD            K21.9    Gastro-esophageal reflux disease without esophagitis    268.9 Vitamin D deficiency, unspecified            E55.9    Vitamin D deficiency, unspecified    272.0 Essential hypercholesterolemia            E78.00    Pure hypercholesterolemia, unspecified    244.8 Acquired hypothyroidism            E03.8    Other specified hypothyroidism    427.31 Atrial fibrillation            I48.91    Unspecified atrial fibrillation

## 2021-05-18 NOTE — PROGRESS NOTES
Yulissa Spaulding 1955     Office/Outpatient Visit    Visit Date: Mon, Oct 29, 2018 08:22 am    Provider: Rayne Gee MD (Assistant: Selma Lara)    Location: [facility]        Electronically signed by Rayne Gee MD on  10/30/2018 01:35:03 PM                             SUBJECTIVE:        CC:     Mrs. Spaulding is a 62 year old White female.  severe knee pain-follow up, pt still in great amount of pain (PT NOT CURRENTLY TAKING CYCLOBENZAPRINE OR PREDNISONE);         HPI:     Left knee pain started 10/11 abruptly  2 weeks ago and she went to the [facility] on 10/17 and was told to go to the ER because the pain was so severe,she went to [facility] and was worked up for a DVT-her d dimer was neg so she was given a shot of toradol and told to see her PCP.  Her pain is severe and behind the knee and shoots down the L side and down the calf as well as up her hamstring. She also noticed swelling of left lower leg. Went to a walk in clinic and x-ray was normal. Her daughter is an US tech and had access to an US machine and did not see an effusion per patient. Dr Wilkins then sent her to the hospital for an official venous duples L LE and it was neg. He gave her 8 hydrocodone and toradol which helped with her pain, I refilled her pain meds over the weekend and she is out of meds today.  She is still in moderate pain behind the L knee and down the L shin and up the posterior thigh.  Swelling is present but improved.  She has been in significant pain for almost 3 weeks now and states she cant take the pain anymore.     ROS:     CONSTITUTIONAL:  Negative for fatigue and fever.      EYES:  Negative for blurred vision.      E/N/T:  Negative for diminished hearing and nasal congestion.      CARDIOVASCULAR:  Negative for chest pain and palpitations.      RESPIRATORY:  Negative for recent cough and dyspnea.      GASTROINTESTINAL:  Negative for abdominal pain, constipation, diarrhea, nausea and vomiting.       GENITOURINARY:  Negative for dysuria and urinary incontinence.      MUSCULOSKELETAL:  Positive for arthralgias.   Negative for myalgias.      INTEGUMENTARY/BREAST:  Negative for atypical mole(s) and rash.      NEUROLOGICAL:  Negative for paresthesias and weakness.      PSYCHIATRIC:  Negative for anxiety, depression and sleep disturbance.          PMH/FMH/SH:     Last Reviewed on 10/29/2018 09:02 AM by Rayne Gee    Past Medical History:             PAST MEDICAL HISTORY         Positive for    Hyperlipidemia and    Hypertension;     atherosclerosis    allergies     Positive for    Hypothyroidism;     Positive for    benign breast lump;         GYNECOLOGICAL HISTORY:     miscarriage 3    1 DNC     DEPRESSION    VOICE BOX DYSFUNCTION HTN    ALLERGIC RHINITIS         PREVENTIVE HEALTH MAINTENANCE             BONE DENSITY: was last done 2015 with normal results     MAMMOGRAM: Done within last 2 years and results in are chart was last done 2016 with the following abnormalaties noted-- normal after diagonistic images         Surgical History:         Hernia Repair: umbilical;     GASTRIC SLEEVE SURGERY      Cholecystectomy    Hysterectomy: , SUPRACERVICAL , OOPHERECTOMY BILATERAL;     DNC;    BENIGN BREAST MASS REMOVED LEFT;      EGD 2011 normal;    Bladder repair 3/2005;         Family History:         Mother: COPD     Positive for Coronary Artery Disease ( father; mother ), Hyperlipidemia ( father ) and Hypertension ( father ).          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          Alcohol: Frequency: Once a week         Substance Abuse History:     Last Reviewed on 10/19/2018 02:31 PM by Taylor Bunn        Mental Health History:     Last Reviewed on 10/19/2018 02:31 PM by Taylor Bunn  ZEINA        Communicable Diseases (eg STDs):     Last Reviewed on 10/19/2018 02:31 PM by Taylor Bunn            Allergies:     Last Reviewed on 10/19/2018 02:38 PM by Taylor Bunn      No Known Drug Allergies.     Atorvastatin Calcium: (Adverse Reaction)        Current Medications:     Last Reviewed on 10/19/2018 02:38 PM by Taylor Bunn    Breo Ellipta 200mcg/25mcg Inhalation Powder Take 1 inhalation(s) by mouth daily     Fluticasone Propionate 50mcg/1actuation Nasal Spray 1 or 2 sprays in each nostril qday prn     Hydrochlorothiazide (HCTZ) 25mg Tablet 1 tab daily     Levothyroxine Sodium 0.175mg Tablet Take 1 tablet(s) by mouth daily     Lorazepam 0.5mg Tablet 1 PO BID PRN     Singulair  10mg Tablet 1 tab daily     Vitamin D3 5,000IU Capsules 1 capsule every day     Zyrtec 10mg Tablet 1 tab daily     Aspirin (ASA) 81mg Tablets, Enteric Coated 1 tab daily     Norco 5mg/325mg Tablet Take 1 tablet(s) by mouth q8h prn     Pristiq 100mg Tablets, Extended Release Take 1 tablet(s) by mouth daily     Cyclobenzaprine HCl 10mg Tablet 1 tablet TID prn     Prednisone 10mg Tablet     calcium chewables     OSTAFLEX FOR BONES ONCE DAILY         OBJECTIVE:        Vitals:         Current: 10/29/2018 8:26:43 AM    Ht:  5 ft, 9.5 in;  Wt: 271.6 lbs;  BMI: 39.5    T: 97.7 F (oral);  BP: 145/102 mm Hg (right arm, sitting);  P: 106 bpm (left arm (BP Cuff), sitting);  sCr: 0.71 mg/dL;  GFR: 108.82        Repeat:     8:38:17 AM     BP:   117/83mm Hg (right arm, sitting, Pulse 103)         Exams:     PHYSICAL EXAM:     GENERAL: vital signs recorded - well developed, well nourished;  well groomed;  no apparent distress;     EYES: extraocular movements intact; conjunctiva and cornea are normal; PERRLA;     E/N/T: OROPHARYNX:  normal mucosa, dentition, gingiva, and posterior pharynx;     NECK: range of motion is normal; thyroid exam reveals not enlarged;     RESPIRATORY: normal respiratory rate and pattern with no distress; normal  breath sounds with no rales, rhonchi, wheezes or rubs;     CARDIOVASCULAR: normal rate; rhythm is regular;  no systolic murmur; no edema;     GASTROINTESTINAL: nontender; normal bowel sounds;     MUSCULOSKELETAL: gait: affected by a right leg limp;  normal overall tone tender to palpation with notable edema posterior L knee, pain with valgus stress, no joint line pain, patella mobile and NT, decreased ROM to flexion, pain with complete extension in posterior knee, 2/4 patellar DTR symm B; L knee 19 inches, R knee 17 1/2 inches, no pain to palpation calves B, L calf 18 inches, R calf 17 inches, no edema in ankles or feet B     NEUROLOGIC: mental status: alert and oriented x 3;     PSYCHIATRIC:  appropriate affect and demeanor; normal speech pattern; grossly normal memory;         Procedures:     Knee pain     1. Toradol 60 mg given IM in the right hip; administered by SCS;  lot number [id number]; expires 02/20             ASSESSMENT           719.46   M25.562  Knee pain              DDx:     V58.69   Z79.899  Use of high risk medications              DDx:         ORDERS:         Radiology/Test Orders:       [id number]  Left MRI, any joint of lower extremity w/o contrast  (Send-Out)           Lab Orders:       [id number]  Drug test prsmv read direct optical obs pr date  (Send-Out)  (specimen not collected./tls)         Other Orders:       [id number]  Therapeutic injection  (In-House)         79047  Therapeutic injection  (In-House)           Toradol, per 15 mg (x4)                 PLAN:          Knee pain  venous duplex was neg for DVT-will MRI her L knee due to severity of ongoing pain         RADIOLOGY:  I have ordered a left w/o contrast knee MRI to be done today.            Orders:       [id number]  Left MRI, any joint of lower extremity w/o contrast  (Send-Out)         21610  Therapeutic injection  (In-House)                     Toradol, per 15 mg (x4)       [id number]  Therapeutic injection  (In-House)            Use of high  risk medications         RECOMMENDATIONS given include: divya reviewed, drug screen performed and appropriate, consent is reviewed and signed and on the chart, she is aware of risk of addiction on this medication and understands that she will need to follow up for a review every 3 months and her medications will be adjusted or decreased as deemed appropriate at each visit.  No personal history of drug or alcohol abuse.  No concerns about diversion or abuse.  She denies side effects related to the medication.  She is  aware that she may be called in for pill counts..            Orders:       [id number]  Drug test prsmv read direct optical obs pr date  (Send-Out)  (specimen not collected./tls)             CHARGE CAPTURE           **Please note: ICD descriptions below are intended for billing purposes only and may not represent clinical diagnoses**        Primary Diagnosis:         719.46 Knee pain            M25.562    Pain in left knee              Orders:          [id number]   Office/outpatient visit; established patient, level 4  (In-House)             06079   Therapeutic injection  (In-House)                                           Toradol, per 15 mg (x4)           77155   Therapeutic injection  (In-House)           V58.69 Use of high risk medications            Z79.899    Other long term (current) drug therapy        ADDENDUMS:      ____________________________________    Date: 10/31/2018 11:34 AM    Author: Norma Hinds         Visit Note Faxed to:        Brian Justin  (Orthopedics); Number [phone]

## 2021-05-18 NOTE — PROGRESS NOTES
"Yulissa Spaulding 1955     Office/Outpatient Visit    Visit Date: Thu, Sep 5, 2019 02:07 pm    Provider: Rayne Gee MD (Assistant: Annmarie Hunter MA)    Location: [facility]        Electronically signed by Rayne Gee MD on  09/12/2019 10:05:11 AM                             SUBJECTIVE:        CC: (RAN OUT OF GABAPENTIN) leg pain         HPI:     Yulissa had L knee replacement over 3 months ago, and she is still in moderate pain with significant swelling of the L knee and she wants  a second opinion.  Her surgeon is Dr Justin and he set her up for allergy testing and she is allergic  to the cement in her joint-she is allergic to methyl methacrylate, 2-hydroxyethyl methacrylate and ethyl acrylate (all bone cements).  Her new surgeon is Dr Braxton Crawford and he scheduled her surgery Sept 16th but this was cancelled due to inability to get the \"parts\".  She is miserable in pain, pain is most severe at night when she sleeps and turns over.  She is already on zyrtec daily and now on benadryl prn.  She is not wearing a brace.     ROS:     CONSTITUTIONAL:  Negative for chills, fatigue and fever.      E/N/T:  Negative for nasal congestion, hoarseness and sore throat.      CARDIOVASCULAR:  Negative for chest pain, palpitations, tachycardia, orthopnea, and edema.      RESPIRATORY:  Negative for cough, dyspnea, and hemoptysis.      GASTROINTESTINAL:  Negative for abdominal pain, constipation, diarrhea, melena, nausea and vomiting.      MUSCULOSKELETAL:  Positive for arthralgias (L knee pain).      INTEGUMENTARY/BREAST:  Negative for rash.      NEUROLOGICAL:  Negative for dizziness and headaches.      PSYCHIATRIC:  Negative for anxiety, depression, and sleep disturbances.          PMH/FMH/SH:     Last Reviewed on 9/05/2019 02:50 PM by Rayne Gee    Past Medical History:             PAST MEDICAL HISTORY         Positive for    Hyperlipidemia and    Hypertension;     atherosclerosis     " Positive for    Hypothyroidism;     Positive for    Allergies;     Positive for    benign breast lump;     Positive for    Depression and    Voice Box Dysfunction;         GYNECOLOGICAL HISTORY:     miscarriage 3    1 DNC         CURRENT MEDICAL PROVIDERS:    Cardiologist: Dr. Nolasco    Gastroenterologist: Dr. Eun Gasca    Orthopedist: Dr Braxton Crawford MD ([facility])    Rheumatologist: Dr. Nigel Webster         PREVENTIVE HEALTH MAINTENANCE             BONE DENSITY: was last done 10/16/17 with normal results     COLORECTAL CANCER SCREENING: Up to date (colonoscopy q10y; sigmoidoscopy q5y; Cologuard q3y) was last done 17, Results are in chart; colonoscopy with the following abnormalities noted-- Polyp(s) and repeat 3 years     EYE EXAM: was last done ____ (enter month/year) >10 YEARS AGO     MAMMOGRAM: Done within last 2 years and results in are chart was last done 2019 with normal results     PAP SMEAR: was last done  with normal results         Surgical History:         Hernia Repair: umbilical;     GASTRIC SLEEVE SURGERY      Cholecystectomy    Hysterectomy: , SUPRACERVICAL , OOPHERECTOMY BILATERAL;     DNC;    BENIGN BREAST MASS REMOVED LEFT;      EGD 2011 normal;    Bladder repair 3/2005;         Family History:         Mother: COPD     Positive for Coronary Artery Disease ( father; mother ), Hyperlipidemia ( father ) and Hypertension ( father ).          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          Alcohol: Frequency: Once a week         Substance Abuse History:     Last Reviewed on 10/19/2018 02:31 PM by Taylor Bunn        Mental Health History:     Last Reviewed on 10/19/2018 02:31 PM by Taylor Bunn        Communicable Diseases (eg STDs):     Last Reviewed on 10/19/2018  02:31 PM by Taylor Bunn            Allergies:     Last Reviewed on 7/17/2019 09:28 AM by Annmarie Hunter      No Known Drug Allergies.     Atorvastatin Calcium: (Adverse Reaction)        Current Medications:     Last Reviewed on 7/17/2019 09:29 AM by Annmarie Hunter    Lorazepam 0.5mg Tablet 1 PO BID PRN     Hydrochlorothiazide (HCTZ) 25mg Tablet 1 tab daily     Levothyroxine Sodium 0.175mg Tablet Take 1 tablet(s) by mouth daily     Singulair  10mg Tablet 1 tab daily     Vitamin D3 5,000IU Capsules 1 capsule every day     Breo Ellipta 200mcg/25mcg Inhalation Powder Take 1 inhalation(s) by mouth daily     Cartia XT 180mg Capsules, Extended Release Take 1 capsule(s) by mouth daily     Zyrtec 10mg Tablet 1 tab daily     Diclofenac Sodium 1% Topical Gel Apply 2 gm(s) to affected area qid prn pain     Gabapentin     Melatonin 5mg Tablet 4 tabs HS     Oxycodone/Acetaminophen  7.5mg/325mg Tablet 1 tab PO q4 hr PRN back pain     Eliquis 5mg Tablet take 1 tab BID     calcium chewables     OSTAFLEX FOR BONES ONCE DAILY         OBJECTIVE:        Vitals:         Current: 9/5/2019 2:33:53 PM    Ht:  5 ft, 9.5 in;  Wt: 267.2 lbs;  BMI: 38.9    BP: 161/97 mm Hg (left arm, sitting);  P: 56 bpm (left arm (BP Cuff), sitting);  sCr: 0.78 mg/dL;  GFR: 97.12    O2 Sat: 99 % (room air)        Exams:     PHYSICAL EXAM:     GENERAL: vital signs recorded - well developed, well nourished;  no apparent distress, anxious, tearful;     RESPIRATORY: normal respiratory rate and pattern with no distress; normal breath sounds with no rales, rhonchi, wheezes or rubs;     CARDIOVASCULAR: normal rate; rhythm is regular;  no systolic murmur; no edema;     MUSCULOSKELETAL: gait: affected by a left leg limp;  L knee-2+ edema, no erythema, poor extension and flexion, incision C/D/I;     NEUROLOGICAL:  cranial nerves, motor and sensory function, reflexes, gait and coordination are all intact;     PSYCHIATRIC: affect/demeanor: anxious, agitated;   psychomotor: displays psychomotor agitation;  speech pattern: flat and slowed;  normal thought and perception;         Lab/Test Results:             Amphetamines Screen, Urin:  Negative (09/05/2019),     BAR-Barbiturates Screen, Urin:  Negative (09/05/2019),     Buprenorphine:  Negative (09/05/2019),     BZO-Benzodiazepines Screen,Ur:  Positive (09/05/2019),     Cocaine(Metab.)Screen, Ur:  Negative (09/05/2019),     MDMA-Ecstasy:  Negative (09/05/2019),     Met-Methamphetamine:  Negative (09/05/2019),     MTD-Methadone Screen, Urine:  Negative (09/05/2019),     Opiate Screen, Urine:  Negative (09/05/2019),     OXY-Oxycodone:  Negative (09/05/2019),     PCP-Phencyclidine Screen, Uri:  Negative (09/05/2019),     THC Cannabinoids Screen, Urin:  Negative (09/05/2019),     Urine temperature:  confirmed (09/05/2019),     Date and time of last pill:  lorazepam & pristic 9/5/19 @ 0900  /mnp (09/05/2019),     Performed by:  tls (09/05/2019),     Collection Time:  1500 (09/05/2019),             ASSESSMENT           719.46   M25.562  Knee pain              DDx:     300.4   F41.3   F32.1  Anxiety with depression              DDx:     278.02   E66.3  Overweight              DDx:     V58.69   Z79.899  Use of high risk medications              DDx:     782.0   R20.2  Paresthesia              DDx:         ORDERS:         Meds Prescribed:       Refill of: Pristiq (Desvenlafaxine)  100mg Tablets, Extended Release Take 1 tablet(s) by mouth daily  #90 (Ninety) tablet(s) Refills: 1       Buspirone HCl 15mg Tablet Take 1 tablet(s) by mouth bid  #60 (Sixty) tablet(s) Refills: 2       Hydrocodone/Acetaminophen 5mg/325mg Tablet 1 at hs PRN  #30 (Thirty) tablet(s) Refills: 0         Lab Orders:       [id number]  Drug test prsmv read direct optical obs pr date  (In-House)                   PLAN:          Knee pain will fit her with a straight leg brace to wear only at night and will restart her pain meds for night time.           Prescriptions:        Hydrocodone/Acetaminophen 5mg/325mg Tablet 1 at hs PRN  #30 (Thirty) tablet(s) Refills: 0          Anxiety with depression she is on pristiq, will add buspirone.   Her daughter is posting on facebook how awful and mean and agitated her mom is and Yulissa is tearful and really anxious and upset in the office today.           Prescriptions:       Refill of: Pristiq (Desvenlafaxine)  100mg Tablets, Extended Release Take 1 tablet(s) by mouth daily  #90 (Ninety) tablet(s) Refills: 1       Buspirone HCl 15mg Tablet Take 1 tablet(s) by mouth bid  #60 (Sixty) tablet(s) Refills: 2          Overweight she needs to start eating healthier, more lean meats baked or grilled with fruits and vegetables          Use of high risk medications         RECOMMENDATIONS given include: divya reviewed, drug screen performed and appropriate, consent is reviewed and signed and on the chart, she is aware of risk of addiction on this medication and understands that she will need to follow up for a review every 3 months and her medications will be adjusted or decreased as deemed appropriate at each visit.  No personal history of drug or alcohol abuse.  No concerns about diversion or abuse.  She denies side effects related to the medication.  She is  aware that she may be called in for pill counts..            Orders:       [id number]  Drug test prsmv read direct optical obs pr date  (In-House)            Paresthesia of her fingers-she wants her gabapentin refilled-I will refill as topical only             CHARGE CAPTURE           **Please note: ICD descriptions below are intended for billing purposes only and may not represent clinical diagnoses**        Primary Diagnosis:         719.46 Knee pain            M25.562    Pain in left knee              Orders:          [id number]   Office/outpatient visit; established patient, level 4  (In-House)           300.4 Anxiety with depression            F41.3    Other mixed anxiety disorders           F32.1     Major depressive disorder, single episode, moderate    278.02 Overweight            E66.3    Overweight    V58.69 Use of high risk medications            Z79.899    Other long term (current) drug therapy              Orders:          [id number]   Drug test prsmv read direct optical obs pr date  (In-House)           782.0 Paresthesia            R20.2    Paresthesia of skin

## 2021-05-18 NOTE — PROGRESS NOTES
Yulissa Spaulding  1955     Office/Outpatient Visit    Visit Date: Tue, Apr 20, 2021 03:02 pm    Provider: Rayne Gee MD (Assistant: oRsalinda Leyva MA)    Location: [facility]        Electronically signed by Rayne Gee MD on  04/22/2021 09:40:29 AM                             Subjective:        CC: headache for a month    HPI:       Headache for over a month, headache is daily, at first it was severe, start behind her eyes and radiate to top of head and today it is in her posterior scalp and sharp and stabbing, her Has tend to last all day, she is on tylenol (often 6 pills) all day long. Laying down helps.  She has afib and is on eliquis and she recently had a holter monitor and the cardiologist called her last week and said he needed to see her asap so she is going to see him tomorrow.  Her vision is worse.    She is not allowed to be on NSAID due to tylenol use.    Her BP at home have been good.          Dx with essential (primary) hypertension; her current cardiac medication regimen includes a diuretic ( HCTZ ) and a beta-blocker.  Compliance with treatment has been good; she takes her medication as directed.  She is tolerating the medication well without side effects.  Mrs. Spaulding does not check her blood pressure other than at her clinic appointments.      ROS:     CONSTITUTIONAL:  Positive for fatigue.   Negative for chills or fever.      E/N/T:  Negative for nasal congestion, hoarseness and sore throat.      CARDIOVASCULAR:  Negative for chest pain, palpitations, tachycardia, orthopnea, and edema.      RESPIRATORY:  Negative for cough, dyspnea, and hemoptysis.      GASTROINTESTINAL:  Negative for abdominal pain, constipation, diarrhea, melena, nausea and vomiting.      MUSCULOSKELETAL:  Positive for arthralgias (L knee pain).      INTEGUMENTARY/BREAST:  Negative for rash.      NEUROLOGICAL:  Negative for dizziness and headaches.      PSYCHIATRIC:  Negative for anxiety,  depression, and sleep disturbances.          Past Medical History / Family History / Social History:         Last Reviewed on 2021 03:56 PM by Rayne Gee    Past Medical History:             PAST MEDICAL HISTORY         Positive for    Hyperlipidemia and    Hypertension;     atherosclerosis     Positive for    Hypothyroidism;     Positive for    Allergies;     Positive for    benign breast lump;     Positive for    Depression and    Voice Box Dysfunction;         GYNECOLOGICAL HISTORY:     miscarriage 3    1 DNC         CURRENT MEDICAL PROVIDERS:    Cardiologist: Dr. Nolasco    Gastroenterologist: Dr. Eun Gasca    Orthopedist: Dr Braxton Crawford MD ([facility])    Rheumatologist: Dr. Nigel Webster         PREVENTIVE HEALTH MAINTENANCE             BONE DENSITY: was last done 2020 with the following abnormality noted-- Bone density in the femoral necks has decreased by 6.6 percent compared to 2017     COLORECTAL CANCER SCREENING: Up to date (colonoscopy q10y; sigmoidoscopy q5y; Cologuard q3y) was last done 2020, Results are in chart; colonoscopy with the following abnormalities noted-- Polyp(s) and repeat 3 years     EYE EXAM: was last done ____ (enter month/year) >10 YEARS AGO     MAMMOGRAM: Done within last 2 years and results in are chart was last done 21 with normal results     PAP SMEAR: was last done 2019 with normal results         Surgical History:         Hernia Repair: umbilical;     GASTRIC SLEEVE SURGERY     Cholecystectomy    Hysterectomy: , SUPRACERVICAL , OOPHERECTOMY BILATERAL;     DNC;    BENIGN BREAST MASS REMOVED LEFT;     EGD 2011 normal;    Bladder repair 3/2005;         Family History:         Mother: COPD     Positive for Coronary Artery Disease ( father; mother ), Hyperlipidemia ( father ) and Hypertension ( father ).          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          Alcohol: Frequency: Once a week         Substance Abuse History:     Last Reviewed on 10/19/2018 02:31 PM by Taylor Bunn        Mental Health History:     Last Reviewed on 10/19/2018 02:31 PM by Taylor Bunn        Communicable Diseases (eg STDs):     Last Reviewed on 10/19/2018 02:31 PM by Taylor Bunn        Allergies:     Last Reviewed on 11/16/2020 03:14 PM by Haley Friend    Atorvastatin Calcium:   (Adverse Reaction)    band aides:      methyl methacrylate:      hydroxyethly methacrylate:      ethyl acrylate:      Bone Cement:          Current Medications:     Last Reviewed on 11/16/2020 03:12 PM by Haley Friend    HYDROcodone-acetaminophen 7.5-325 mg oral tablet [take 1 tablet by oral route every 4 hours as needed for pain]    ondansetron HCL 4 mg oral tablet [take 1 tablet (4 mg) by oral route every 6 hours prn]    Pristiq 100 mg oral Tablet, Extended Release 24 hr [TAKE ONE TABLET BY MOUTH EVERY DAY -SWALLOW WHOLE -DO NOT CHEW, CRUSH, OR BREAK]    hydroCHLOROthiazide 25 mg oral tablet [TAKE ONE TABLET BY MOUTH EVERY DAY]    Singulair 10 mg oral tablet [1 tab daily]    Zyrtec 10 mg oral tablet [1 tab daily]    cholecalciferol (vitamin D3) 125 mcg (5,000 unit) oral capsule [1 capsule every day]    diclofenac sodium 1 % Topical Gel [Apply 2 gm(s) to affected area qid prn pain]    calcium chewables     Breo Ellipta 200-25 mcg/dose Inhalation Blister, With Inhalation Device [INHALE ONE INHALATION MOUTH EVERY DAY - (RINSE MOUTH AND SPIT AFTER USE, DISCARD SIX WEEKS AFTER REMOVAL FROM FOIL POUCH)]    Eliquis 5 mg oral tablet [take 1 tab BID]    Cartia  mg oral Capsule, Extended Release 24 hr [Take 1 capsule(s) by mouth daily]    Melatonin 5 mg oral tablet [4 tabs HS]    busPIRone 15 mg oral tablet [TAKE ONE TABLET BY MOUTH TWICE A DAY]    levothyroxine 150 mcg oral tablet  [take 1 tablet (150 mcg) by oral route once daily]        Objective:        Vitals:         Current: 4/20/2021 3:06:48 PM    Ht:  5 ft, 9.5 in;  Wt: 258 lbs;  BMI: 37.6T: 96.3 F (temporal);  BP: 148/85 mm Hg (left arm, sitting);  P: 116 bpm (left arm (BP Cuff), sitting);  sCr: 0.78 mg/dL;  GFR: 93.21        Exams:     PHYSICAL EXAM:     GENERAL:  well developed and nourished; appropriately groomed; in no apparent distress;     EYES: extraocular movements intact; conjunctiva and cornea are normal;     E/N/T: EARS:  normal external auditory canals and tympanic membranes;  grossly normal hearing; NOSE: no sinus tenderness; OROPHARYNX:  normal mucosa, dentition, gingiva, and posterior pharynx;     NECK:  supple, full ROM; no thyromegaly; no carotid bruits;     RESPIRATORY: normal respiratory rate and pattern with no distress;     CARDIOVASCULAR: regular rate and rhythm; normal S1, S2; no murmur, rub, or gallop; normal PMI; mildly tachycardic;  rhythm is irregularly irregular;     MUSCULOSKELETAL: normal overall tone     NEUROLOGICAL:  cranial nerves, motor and sensory function, reflexes, gait and coordination are all intact; memory intact    PSYCHIATRIC: appropriate affect and demeanor; normal psychomotor function; normal speech pattern;         Lab/Test Results:         Urine temperature: confirmed (04/20/2021),     All urine drug screen levels confirmed negative: yes (04/20/2021),     Date and time of last pill: hydrocodone 4/4/2021, NEW SCRIPT (04/20/2021),     Performed by: pr (04/20/2021),     Collection Time: 1628 (04/20/2021),             Assessment:         R51.9   Headache, unspecified       I10   Essential (primary) hypertension       I48.91   Unspecified atrial fibrillation       Z79.899   Other long term (current) drug therapy           ORDERS:         Meds Prescribed:       [Recorded] butalbital-acetaminophen-caff -40 mg oral capsule [take 1 - 2 capsules by oral route every bid prn HA ]        [Refilled] butalbital-acetaminophen-caff -40 mg oral capsule [take 1 - 2 capsules by oral route every bid prn HA ], #40 (forty) capsules, Refills: 0 (zero)         Radiology/Test Orders:       [id number]  Computed tomography, head or brain; without contrast material  (Send-Out)              Lab Orders:       [id number]  Drug test prsmv read direct optical obs pr date  (In-House)                      Plan:         Headache, unspecifiedshe needs an eye exam asap.  I will set her up for a CT head.  Her BP are well controlled.  I will order her fioricet for short term use.         RADIOLOGY:  I have ordered a head CT w/out contrast to be done today.            Prescriptions:       [Recorded] butalbital-acetaminophen-caff -40 mg oral capsule [take 1 - 2 capsules by oral route every bid prn HA ]       [Refilled] butalbital-acetaminophen-caff -40 mg oral capsule [take 1 - 2 capsules by oral route every bid prn HA ], #40 (forty) capsules, Refills: 0 (zero)           Orders:       [id number]  Computed tomography, head or brain; without contrast material  (Send-Out)              Essential (primary) hypertensionstable and well controlled        Unspecified atrial fibrillationshpascual is on eliquis so she is protected from strokes, she will f/u with cardiology tomorrow-she is mildly tachycardic today but asymptomatic, on CCB        Other long term (current) drug therapy        RECOMMENDATIONS given include: divya reviewed, drug screen performed and appropriate, consent is reviewed and signed and on the chart, she is aware of risk of addiction on this medication and understands that she will need to follow up for a review every 3 months and her medications will be adjusted or decreased as deemed appropriate at each visit.  No personal history of drug or alcohol abuse.  No concerns about diversion or abuse.  She denies side effects related to the medication.  She is  aware that she may be called in for pill counts..             Orders:       [id number]  Drug test prsmv read direct optical obs pr date  (In-House)                  Charge Capture:         Primary Diagnosis:     R51.9  Headache, unspecified           Orders:      [id number]  Office/outpatient visit; established patient, level 4  (In-House)              I10  Essential (primary) hypertension     I48.91  Unspecified atrial fibrillation     Z79.899  Other long term (current) drug therapy           Orders:      [id number]  Drug test prsmv read direct optical obs pr date  (In-House)

## 2021-05-18 NOTE — PROGRESS NOTES
Yulissa Spaulding 1955     Office/Outpatient Visit    Visit Date: Fri, Jan 4, 2019 08:58 am    Provider: Rayne Gee MD (Assistant: Sarah Spurling, MA)    Location: [facility]        Electronically signed by Rayne Gee MD on  01/04/2019 05:21:40 PM                             SUBJECTIVE:        CC: medicare wellness exam and gyne exam         HPI:         Mrs. Spaulding presents with well Woman Exam.  Her last physical exam was 1 year ago.  She is status-post hysterectomy.  She is not currently using any form of contraception.  She does not perform breast self-exams.    Her last Pap smear was 1 year ago.   Her last mammogram was 1 year ago.   Her last DEXA was 1 year ago.   Preventative Health updated today.  She is current with her influenza.  She is not current with Td immunization.  Mrs. Spaulding denies any history of abnormal Pap smears.  Tobacco: She has never smoked.  she still has her cervix             PHQ-9 Depression Screening: Completed form scanned and in chart; Total Score 13 Alcohol Consumption Screening: Completed form scanned and in chart; Total Score 1         Mrs. Spaulding is here for a Medicare wellness visit.  ADVANCED DIRECTIVES: None     Returning to health checkup, the required HRA questions are integrated within this visit note. Family medical history and individual medical/surgical history were reviewed and updated.  A current height, weight, BMI, blood pressure, and pulse were recorded in the vitals section of the note and have been reviewed. Patient's medications, including supplements, were recorded in the chart and reviewed.  Current providers and suppliers were reviewed and updated.          Self-Assessment of Health: She rates her health as fair A review of cognitive impairment was performed, including ability to drive a car, manage finances, and any memory changes, and was found to be negative.  A review of functional ability, including bathing, dressing,  walking, and urine/bowel continence as well as level of safety was performed and was found to be negative.  Falls Risk: Has not had any falls or only one fall without injury in the past year.  In regard to hearing, she reports having trouble hearing the TV/radio when others do not and having to strain to hear or understand conversations.  Concerning home safety, she reports that at home she DOES have adequate lighting, handrails on stairs and functioning smoke alarms, but not a skid resistant shower/tub, grab bars in the bath or absence of throw rugs.          Immunization Status: ** >10 years since last Td booster; ** Has not received pneumococcal vaccination; ** Has not received Prevnar 13 vaccination; Age>60, no shingles vaccination; Physical Activity: She never excercises.; Type of diet patient normally eats is described as poor--needs improvement.  Tobacco: She has never smoked.  Preventative Health updated today         Essential hypercholesterolemia details; current treatment includes a low cholesterol/low fat diet and HAD TO QUIT THE STATIN DUE TO SE'S.  Compliance with treatment has been fair; she does not follow a diet and exercise regimen.  She denies experiencing any hypercholesterolemia related symptoms.          Additionally, she presents with history of acquired hypothyroidism.  this was first diagnosed >10 years ago.  She is currently taking Levothyroid, 175 mcg daily.  She cannot remember when a TSH was last checked.  The result was reported as normal.  She denies any related symptoms.  She reports no symptoms suggestive of adverse medication effect.          Hypertension details; she is not using any nonpharmacologic treatment modalities.  Her current cardiac medication regimen includes a diuretic ( HCTZ ).  Mrs. Spaulding does not check her blood pressure other than at her clinic appointments.  She is tolerating the medication well without side effects.  Compliance with treatment has been good;  she takes her medication as directed.      L knee pain s/p meniscal tear and ligament injury, she is sched for surgery with Dr Justin next week     new onset atrial fibrillation, she is now on cartia/eliquis and seeing cardiology     ROS:     CONSTITUTIONAL:  Negative for chills, fatigue and fever.      EYES:  Positive for blurred vision.   Negative for eye pain.      E/N/T:  Negative for nasal congestion, hoarseness and sore throat.      CARDIOVASCULAR:  Negative for chest pain, palpitations, tachycardia, orthopnea, and edema.      RESPIRATORY:  Negative for cough, dyspnea, and hemoptysis.      GASTROINTESTINAL:  Negative for abdominal pain, constipation, diarrhea, melena, nausea and vomiting.      GENITOURINARY:  Positive for urinary incontinence.   Negative for dysuria or vaginal discharge.      MUSCULOSKELETAL:  Positive for arthralgias (L knee pain).      INTEGUMENTARY/BREAST:  Negative for rash.      NEUROLOGICAL:  Negative for dizziness and headaches.      PSYCHIATRIC:  Negative for anxiety, depression, and sleep disturbances.          PMH/FMH/SH:     Last Reviewed on 2019 09:22 AM by Rayne Gee    Past Medical History:             PAST MEDICAL HISTORY         Positive for    Hyperlipidemia and    Hypertension;     atherosclerosis     Positive for    Hypothyroidism;     Positive for    Allergies;     Positive for    benign breast lump;     Positive for    Depression and    Voice Box Dysfunction;         GYNECOLOGICAL HISTORY:     miscarriage 3    1 DNC         CURRENT MEDICAL PROVIDERS:    Cardiologist: Dr. Nolasco    Gastroenterologist: Dr. Eun Gasca    Rheumatologist: Dr. Nigel Webster         PREVENTIVE HEALTH MAINTENANCE             BONE DENSITY: was last done 10/16/17 with normal results     COLORECTAL CANCER SCREENING: Up to date (colonoscopy q10y; sigmoidoscopy q5y; Cologuard q3y) was last done 17, Results are in chart; colonoscopy with the following abnormalities  noted-- Polyp(s) and repeat 3 years     EYE EXAM: was last done ____ (enter month/year) >10 YEARS AGO     MAMMOGRAM: Done within last 2 years and results in are chart was last done 10/19/17 with the following abnormalaties noted-- normal after diagonistic images     PAP SMEAR: was last done 2017 with normal results         Surgical History:         Hernia Repair: umbilical;     GASTRIC SLEEVE SURGERY      Cholecystectomy    Hysterectomy: 2005, SUPRACERVICAL , OOPHERECTOMY BILATERAL;     DNC;    BENIGN BREAST MASS REMOVED LEFT;      EGD 11/2011 normal;    Bladder repair 3/2005;         Family History:         Mother: COPD     Positive for Coronary Artery Disease ( father; mother ), Hyperlipidemia ( father ) and Hypertension ( father ).          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          Alcohol: Frequency: Once a week         Substance Abuse History:     Last Reviewed on 10/19/2018 02:31 PM by Taylor Bunn        Mental Health History:     Last Reviewed on 10/19/2018 02:31 PM by Taylor Bunn        Communicable Diseases (eg STDs):     Last Reviewed on 10/19/2018 02:31 PM by Taylor Bunn            Immunizations:     zzFluzone pf-quadrivalent 3 and up 10/3/2017     Fluvirin (4 + years dose) 10/15/2014     Fluzone Quadrivalent (3+ years) 10/1/2018         Allergies:     Last Reviewed on 10/29/2018 08:27 AM by Selma Lara      No Known Drug Allergies.     Atorvastatin Calcium: (Adverse Reaction)        Current Medications:     Last Reviewed on 10/29/2018 08:28 AM by Selma Lara    Lorazepam 0.5mg Tablet 1 PO BID PRN     Levothyroxine Sodium 0.175mg Tablet Take 1 tablet(s) by mouth daily     Breo Ellipta 200mcg/25mcg Inhalation Powder Take 1 inhalation(s) by mouth daily     Fluticasone Propionate  50mcg/1actuation Nasal Spray 1 or 2 sprays in each nostril qday prn     Hydrochlorothiazide (HCTZ) 25mg Tablet 1 tab daily     Singulair  10mg Tablet 1 tab daily     Vitamin D3 5,000IU Capsules 1 capsule every day     Zyrtec 10mg Tablet 1 tab daily     Aspirin (ASA) 81mg Tablets, Enteric Coated 1 tab daily     Cyclobenzaprine HCl 10mg Tablet 1 tablet TID prn     Prednisone 10mg Tablet     calcium chewables     OSTAFLEX FOR BONES ONCE DAILY         OBJECTIVE:        Vitals:         Current: 1/4/2019 9:13:23 AM    Ht:  5 ft, 9.5 in;  Wt: 265.2 lbs;  BMI: 38.6    T: 98 F (oral);  BP: 131/78 mm Hg (left arm, sitting);  P: 96 bpm (left arm (BP Cuff), sitting);  sCr: 0.71 mg/dL;  GFR: 106.35        Exams:     PHYSICAL EXAM:     GENERAL: vital signs recorded - well developed, well nourished;  no apparent distress;     EYES: extraocular movements intact; conjunctiva and cornea are normal; PERRLA;     E/N/T:  normal EACs, TMs, nasal/oral mucosa, teeth, gingiva, and oropharynx;     NECK: range of motion is normal; thyroid is non-palpable;     RESPIRATORY: normal respiratory rate and pattern with no distress; normal breath sounds with no rales, rhonchi, wheezes or rubs;     CARDIOVASCULAR: normal rate; rhythm is regular;  no systolic murmur; no edema;     GASTROINTESTINAL: nontender; normal bowel sounds; no organomegaly; rectal exam: normal tone; nontender, guaiac negative stool;     GENITOURINARY: Pap smear taken;  external genitalia: normal without lesions or urethral abnormalities;;  vagina: normal with good pelvic support and no lesions or discharge;; cervix: S/P HYSTERECTOMY, cervix present and wnl noted;;  adnexa: normal with no masses or tenderness     LYMPHATIC: no enlargement of cervical or facial nodes; no supraclavicular nodes;     BREAST/INTEGUMENT: BREASTS: breast exam is normal without masses, skin changes, or nipple discharge;     MUSCULOSKELETAL: gait: affected by a left leg limp;     NEUROLOGICAL:  cranial  nerves, motor and sensory function, reflexes, gait and coordination are all intact;     PSYCHIATRIC:  appropriate affect and demeanor; normal speech pattern; grossly normal memory;         Lab/Test Results:     AUDIO AND VISUAL SCREENING     Audio Testing Full Audiogram 26076 Left 6000 Hz 35 dB 4000 Hz 15 dB 2000 Hz 20 dB 1000 Hz 30 dB 500 Hz 40 dB Right 6000 Hz 55dB dB 4000 Hz 45dB dB 2000 Hz 30 dB 1000 Hz 30 dB 500 Hz 40 dB Audiogram screening resulted in some decibels above 40 Performed by:  tls         Glucose, Urine:  Neg (01/04/2019),     Bilirubin, urine:  Negative (01/04/2019),     Ketones, Urine Strip:  Negative (01/04/2019),     Specific Gravity, urine:  1.025 (01/04/2019),     Blood in Urine:  small (01/04/2019),     pH, urine:  6.0 (01/04/2019),     Protein Urine QL:  negative (01/04/2019),     Urobilinogen, urine:  0.2 E.U./dL (01/04/2019),     Nitrite, Urine:  Negative (01/04/2019),     Leukoctyes, urine:  Trace (01/04/2019),     Appearance:  Slightly Cloudy (01/04/2019),     collection source:  Clean-catch (01/04/2019),     Color:  Dark Yellow (01/04/2019),     Performed by::  and (01/04/2019),     Amphetamines Screen, Urin:  Negative (01/04/2019),     BAR-Barbiturates Screen, Urin:  Negative (01/04/2019),     Buprenorphine:  Negative (01/04/2019),     BZO-Benzodiazepines Screen,Ur:  Positive (01/04/2019),     Cocaine(Metab.)Screen, Ur:  Negative (01/04/2019),     MDMA-Ecstasy:  Negative (01/04/2019),     Met-Methamphetamine:  Negative (01/04/2019),     MTD-Methadone Screen, Urine:  Negative (01/04/2019),     Opiate Screen, Urine:  Negative (01/04/2019),     OXY-Oxycodone:  Negative (01/04/2019),     PCP-Phencyclidine Screen, Uri:  Negative (01/04/2019),     TCA-Tricyclic Antidepressants:  Negative (01/04/2019),     THC Cannabinoids Screen, Urin:  Negative (01/04/2019),     Urine temperature:  confirmed (01/04/2019),     Date and time of last pill:  lorazepam 1-3-19 @ 8pm/SCS (01/04/2019),      Performed by:  preethi (01/04/2019),     Collection Time:  1017 (01/04/2019),             Procedures:     Vaccination against streptococcus pneumoniae     1. Prevnar 13 (pneumococcal PCV13): 0.5 ml unit dose given IM in the left upper arm; administered by [facility];  lot number [id number]; expires 09/2020             ASSESSMENT:           V72.31     Well Woman Exam     V79.0   Z13.89  Screening for depression              DDx:     V70.0   Z00.00  Health checkup              DDx:     V03.82   Z23  Vaccination against streptococcus pneumoniae              DDx:     V76.12   Z12.31  Screening mammogram - other              DDx:     V76.49   Z12.11  Screening for colorectal cancer              DDx:     627.2   N95.9  Post-menopausal state              DDx:     278.01   Z68.31  BMI >30%              DDx:     710.0   M32.10  Acute lupus              DDx:     268.9   E55.9  Vitamin D deficiency, unspecified              DDx:     272.0   E78.00  Essential hypercholesterolemia              DDx:     244.8   E03.8  Acquired hypothyroidism              DDx:     401.1   I10  Hypertension              DDx:     719.46   M25.562  Knee pain              DDx:     V58.69   Z79.899  Use of high risk medications              DDx:     427.31   I48.91  Atrial fibrillation              DDx:         ORDERS:         Meds Prescribed:       Refill of: Lorazepam 0.5mg Tablet 1 PO BID PRN  #30 (Thirty) tablet(s) Refills: 2       Refill of: Levothyroxine Sodium 0.175mg Tablet Take 1 tablet(s) by mouth daily  #90 (Ninety) tablet(s) Refills: 1       Refill of: Breo Ellipta (Fluticasone Furoate/Vilanterol) 200mcg/25mcg Inhalation Powder Take 1 inhalation(s) by mouth daily  #1 (One) inhaler(s) Refills: 5       Refill of: Fluticasone Propionate 50mcg/1actuation Nasal Spray 1 or 2 sprays in each nostril qday prn  #16 (Sixteen) gm Refills: 5       Refill of: Hydrochlorothiazide (HCTZ) 25mg Tablet 1 tab daily  #90 (Ninety) tablet(s) Refills: 1       Refill of:  Singulair  (Montelukast Sodium) 10mg Tablet 1 tab daily  #90 (Ninety) tablet(s) Refills: 1       Refill of: Vitamin D3 5,000IU Capsules 1 capsule every day  #100 (One [place]) capsule(s) Refills: 1       Refill of: Pristiq (Desvenlafaxine) 100mg Tablets, Extended Release Take 1 tablet(s) by mouth daily  #90 (Ninety) tablet(s) Refills: 1         Radiology/Test Orders:       3014F  Screening mammography results documented and reviewed (PV)1  (In-House)         3017F  Colorectal CA screen results documented and reviewed (PV)  (In-House)         64713  Screening mammography bi 2-view inc CAD  (Send-Out)         65420  Screening test of audiologic function, pure tone; air only  (In-House)           Lab Orders:       71072  Hemoccult  (In-House)           [facility] Hepatitis C AB (Medicare Screen)  (Send-Out)         41075  [facility] PHYSICAL: CMP, CBC, TSH, LIPID: [id number], [id number], [id number], [id number]  (Send-Out)         03677  VITD - [facility] Vitamin D, 25 Hydroxy  (Send-Out)         57573  Urinalysis, automated, without microscopy  (In-House)         86888  Drug test prsmv read direct optical obs pr date  (In-House)         APPTO  Appointment need  (In-House)           Procedures Ordered:       70464  Prevnar 13  (In-House)           Other Orders:         Welcome to Medicare  (In-House)         *  Well woman exam  (In-House)         1101F  Pt screen for fall risk; document no falls in past year or only 1 fall w/o injury in past year (MOISES)  (In-House)           Negative EtOH screen  (In-House)           Calculated BMI above the upper parameter and a follow-up plan was documented in the medical record  (In-House)           Administration of pneumococcal vaccine (x1)                 PLAN:          Well Woman Exam pap performed, she still has her cervix, breast exam WNL     LABORATORY:  Labs ordered to be performed today include UA dip in office no micro.      FOLLOW-UP: Schedule a follow-up visit in 3  months..            Orders:      92222  Handling and/or conveyance of specimen for transfer from the physician's office to a laboratory  (In-House)                    Cervical or vaginal cancer screening; pelvic and clinical breast examination (x1)                   Screening papanicolaou smear; obtaining, preparing, conveyance of cervical or vaginal smear to lab (x1)       *  Well woman exam  (In-House)         67851  Urinalysis, automated, without microscopy  (In-House)         APPTO  Appointment need  (In-House)            Screening for depression stable on pristiq     MIPS Current diagnosis of depression and/or bipolar disorder Negative alcohol screen     MAMMOGRAM: Done within last 2 years and results in are chart     COLORECTAL CANCER SCREENING: Results are in chart     BMI Elevated - Follow-Up Plan: She was provided education on weight loss strategies           Orders:       1101F  Pt screen for fall risk; document no falls in past year or only 1 fall w/o injury in past year (MOISES)  (In-House)           Negative EtOH screen  (In-House)         3014F  Screening mammography results documented and reviewed (PV)1  (In-House)         3017F  Colorectal CA screen results documented and reviewed (PV)  (In-House)           Calculated BMI above the upper parameter and a follow-up plan was documented in the medical record  (In-House)            Health checkup MEDICARE WELLNESS EXAM-SHE IS DUE FOR MAMMOGRAM, UTD on  DEXA/COLONOSCOPY, PAP AND PELVIC EXAM PERFORMED TODAY, UTD ON VACCINATIONS INCLUDING: FLU, GIVEN PREVNAR TODAY, DUE FOR SHINGLES VACCINE/PNEUMOVAX/TD VACCINE, MINIMAL FALL RISK, NO MEMORY ISSUES, NO SIGNS/SYMPTOMS OF DEPRESSION, SHE LIVES WITH HER , SHE IS ABLE TO DRIVE AND PERFORM ADL'S/FINANCES INDEPENDENTLY, HEARING IS NOT ADEQUATE, SHE WAS GIVEN A HA ON A LIVING WILL AND A PREV SERVICES HANDOUT AND SAFETY HANDOUT WERE GIVEN TO HER.  CURRENT DOCTOR LIST UPDATED      LABORATORY:  Labs ordered to be performed today include Hepatitis C Ab (Medicare Screen) and PHYSICAL PANEL; CMP, CBC, TSH, LIPID.            Orders:         Welcome to Medicare  (In-House)           [facility] Hepatitis C AB (Medicare Screen)  (Send-Out)         70484  [facility] PHYSICAL: CMP, CBC, TSH, LIPID: [id number], 94232, 76525, [id number]  (Send-Out)         03630  Screening test of audiologic function, pure tone; air only  (In-House)            Vaccination against streptococcus pneumoniae           Orders:       34436  Prevnar 13  (In-House)                     Administration of pneumococcal vaccine (x1)          Screening mammogram - other           Orders:       99959  Screening mammography bi 2-view inc CAD  (Send-Out)            Screening for colorectal cancer           Orders:       08778  Hemoccult  (In-House)            Post-menopausal state dexa is UTD          BMI >30% counseled on wt loss, defers dietician referral at this time          Acute lupus stable          Vitamin D deficiency, unspecified     LABORATORY:  Labs ordered to be performed today include Vitamin D.            Orders:       62197  VITD - [facility] Vitamin D, 25 Hydroxy  (Send-Out)            Essential hypercholesterolemia           Prescriptions:       Refill of: Lorazepam 0.5mg Tablet 1 PO BID PRN  #30 (Thirty) tablet(s) Refills: 2       Refill of: Levothyroxine Sodium 0.175mg Tablet Take 1 tablet(s) by mouth daily  #90 (Ninety) tablet(s) Refills: 1       Refill of: Breo Ellipta (Fluticasone Furoate/Vilanterol) 200mcg/25mcg Inhalation Powder Take 1 inhalation(s) by mouth daily  #1 (One) inhaler(s) Refills: 5       Refill of: Fluticasone Propionate 50mcg/1actuation Nasal Spray 1 or 2 sprays in each nostril qday prn  #16 (Sixteen) gm Refills: 5       Refill of: Hydrochlorothiazide (HCTZ) 25mg Tablet 1 tab daily  #90 (Ninety) tablet(s) Refills: 1       Refill of: Singulair  (Montelukast Sodium) 10mg Tablet 1 tab daily  #90  (Ninety) tablet(s) Refills: 1       Refill of: Vitamin D3 5,000IU Capsules 1 capsule every day  #100 (One [place]) capsule(s) Refills: 1       Refill of: Pristiq (Desvenlafaxine) 100mg Tablets, Extended Release Take 1 tablet(s) by mouth daily  #90 (Ninety) tablet(s) Refills: 1          Acquired hypothyroidism stable, due for labs          Hypertension stable          Knee pain L knee pain s/p meniscal tear and ligament injury, she is sched for surgery with Dr Justin next week          Use of high risk medications         RECOMMENDATIONS given include: divya reviewed, drug screen performed and appropriate, consent is reviewed and signed and on the chart, she is aware of risk of addiction on this medication and understands that she will need to follow up for a review every 3 months and her medications will be adjusted or decreased as deemed appropriate at each visit.  No personal history of drug or alcohol abuse.  No concerns about diversion or abuse.  She denies side effects related to the medication.  She is  aware that she may be called in for pill counts..            Orders:       10588  Drug test prsmv read direct optical obs pr date  (In-House)            Atrial fibrillation rate controlled on cartia, she is also on eliquis             Patient Recommendations:        For  Well Woman Exam:     Schedule a follow-up visit in 3 months.                APPOINTMENT INFORMATION:        Monday Tuesday Wednesday Thursday Friday Saturday Sunday            Time:___________________AM  PM   Date:_____________________             CHARGE CAPTURE:           Primary Diagnosis:     V72.31    Well Woman Exam                Z01.419    Encounter for gynecological examination (general) (routine) without abnormal findings                       Orders:          21895   Preventive medicine, established patient, age 65+ years  (In-House)                           95739  Handling and/or conveyance of specimen for transfer from  the physician's office to a laboratory  (In-House)                                      Cervical or vaginal cancer screening; pelvic and clinical breast examination (x1)                                     Screening papanicolaou smear; obtaining, preparing, conveyance of cervical or vaginal smear to lab (x1)           *   Well woman exam  (In-House)             12519   Urinalysis, automated, without microscopy  (In-House)             APPTO   Appointment need  (In-House)           V79.0 Screening for depression            Z13.89    Encounter for screening for other disorder              Orders:          71613 -25  Office/outpatient visit; established patient, level 4  (In-House)             1101F   Pt screen for fall risk; document no falls in past year or only 1 fall w/o injury in past year (MOISES)  (In-House)                Negative EtOH screen  (In-House)             3014F   Screening mammography results documented and reviewed (PV)1  (In-House)             3017F   Colorectal CA screen results documented and reviewed (PV)  (In-House)                Calculated BMI above the upper parameter and a follow-up plan was documented in the medical record  (In-House)           V70.0 Health checkup            Z00.00    Encounter for general adult medical examination without abnormal findings              Orders:             Welcome to Medicare  (In-House)             16032   Screening test of audiologic function, pure tone; air only  (In-House)           V03.82 Vaccination against streptococcus pneumoniae            Z23    Encounter for immunization              Orders:          99624   Prevnar 13  (In-House)                                           Administration of pneumococcal vaccine (x1)         V76.12 Screening mammogram - other            Z12.31    Encounter for screening mammogram for malignant neoplasm of breast    V76.49 Screening for colorectal cancer            Z12.11     Encounter for screening for malignant neoplasm of colon              Orders:          85691   Hemoccult  (In-House)           627.2 Post-menopausal state            N95.9    Unspecified menopausal and perimenopausal disorder    278.01 BMI >30%            Z68.31    Body mass index (BMI) 31.0-31.9, adult    710.0 Acute lupus            M32.10    Systemic lupus erythematosus, organ or system involvement unspecified    268.9 Vitamin D deficiency, unspecified            E55.9    Vitamin D deficiency, unspecified    272.0 Essential hypercholesterolemia            E78.00    Pure hypercholesterolemia, unspecified    244.8 Acquired hypothyroidism            E03.8    Other specified hypothyroidism    401.1 Hypertension            I10    Essential (primary) hypertension    719.46 Knee pain            M25.562    Pain in left knee    V58.69 Use of high risk medications            Z79.899    Other long term (current) drug therapy              Orders:          17355   Drug test prsmv read direct optical obs pr date  (In-House)           427.31 Atrial fibrillation            I48.91    Unspecified atrial fibrillation        ADDENDUMS:      ____________________________________    Date: 01/10/2019 02:37 PM    Author: Suzy Mccormack         Visit Note Faxed to:        Eun Gasca  (Gastroenterology); Number [phone]

## 2021-05-18 NOTE — PROGRESS NOTES
Yulissa Spaulding 1955     Office/Outpatient Visit    Visit Date: Wed, Jun 27, 2018 08:32 am    Provider: Rayne Gee MD (Assistant: Rosalinda Leyva MA)    Location: [facility]        Electronically signed by Rayne Gee MD on  06/27/2018 03:54:13 PM                             SUBJECTIVE:        CC: URI and joint pain and stiffness         HPI:         Patient to be evaluated for acute sinusitis, other.  This has been a problem for the past 3 weeks.  This is an acute problem without chronic or recurrent episodes.  Her primary symptoms include chest congestion, productive cough, headache, nasal congestion, rhinorrhea and wheezing.  She denies facial pressure or fever.  She has already tried albuterol.  SEEN AT THE URGENT CARE 3 WEEKS AGO     chronic anxiety, today is  her face to face for her benzo refills, she takes lorazepam prn and this help her when her anxiety is worse     Yulissa is  still in alot of pain in her lower back and her knees and R shoulder, she was positive for lupus on her arthritis profile,  I started her on celebrex and she said it upset her stomach.  Her pain is moderate to severe, she cant go up and down steps-she is weak, and is having trouble cleaning her house due to pain.  She has an appt with the rheumatologist July 31st.     vitamin D def-she is on 5000 units a day         With regard to the essential hypercholesterolemia, current treatment includes a low cholesterol/low fat diet and HAD TO QUIT THE STATIN DUE TO SE'S.  Compliance with treatment has been fair; she does not follow a diet and exercise regimen.  She denies experiencing any hypercholesterolemia related symptoms.  Most recent lab tests include AGUSTIN (Antinculear Antibodies):  Positive (NA) (04/11/2018), TSH:  16.340 (mIU/L) (01/03/2018),  13.860 (mIU/L) (04/11/2018), Total Cholesterol:  221 (mg/dL) (01/03/2018), HDL:  61 (mg/dL) (01/03/2018), Triglycerides:  127 (mg/dL) (01/03/2018), LDL:  135 (mg/dL)  (2018), Creatinine, Serum:  0.66 (mg/dl) (2018), Glom Filt Rate, Est:  >60 (ml/min/1.73m2) (2018), Alkaline Phosphatase, Serum:  63 (U/L) (2018), ALT (SGPT):  14 (U/L) (2018), AST (SGOT):  16 (U/L) (2018).          Additionally, she presents with history of acquired hypothyroidism.  this was first diagnosed >10 years ago.  She is currently taking Levothyroid, 175 mcg daily.  She cannot remember when a TSH was last checked.  The result was reported as normal.  She denies any related symptoms.  She reports no symptoms suggestive of adverse medication effect.          Additionally, she presents with history of hypertension.  she is not using any nonpharmacologic treatment modalities.  Her current cardiac medication regimen includes a diuretic ( HCTZ ).  Mrs. Spaulding does not check her blood pressure other than at her clinic appointments.  She is tolerating the medication well without side effects.  Compliance with treatment has been good; she takes her medication as directed.      ROS:     CONSTITUTIONAL:  Positive for fatigue.   Negative for chills or fever.      E/N/T:  Negative for ear pain and sore throat.      CARDIOVASCULAR:  Negative for chest pain, orthopnea, paroxysmal nocturnal dyspnea and pedal edema.      RESPIRATORY:  Negative for dyspnea.      GASTROINTESTINAL:  Negative for abdominal pain, constipation, diarrhea, nausea and vomiting.      PSYCHIATRIC:  Negative for anxiety, depression, and sleep disturbances.          PMH/FMH/SH:     Last Reviewed on 2018 09:02 AM by Rayne Gee    Past Medical History:             PAST MEDICAL HISTORY         Positive for    Hyperlipidemia and    Hypertension;     atherosclerosis    allergies     Positive for    Hypothyroidism;     Positive for    benign breast lump;         GYNECOLOGICAL HISTORY:     miscarriage 3    1 DNC     DEPRESSION    VOICE BOX DYSFUNCTION HTN    ALLERGIC RHINITIS         PREVENTIVE HEALTH  MAINTENANCE             BONE DENSITY: was last done 4/2015 with normal results     MAMMOGRAM: Done within last 2 years and results in are chart was last done 5/2016 with the following abnormalaties noted-- normal after diagonistic images         Surgical History:         Hernia Repair: umbilical;     GASTRIC SLEEVE SURGERY      Cholecystectomy    Hysterectomy: 2005, SUPRACERVICAL , OOPHERECTOMY BILATERAL;     DNC;    BENIGN BREAST MASS REMOVED LEFT;      EGD 11/2011 normal;    Bladder repair 3/2005;         Family History:         Mother: COPD     Positive for Coronary Artery Disease ( father; mother ), Hyperlipidemia ( father ) and Hypertension ( father ).          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          Alcohol: Frequency: Once a week         Substance Abuse History:     Last Reviewed on 10/03/2017 01:52 PM by Misael Hirsch        Mental Health History:     Last Reviewed on 10/03/2017 01:52 PM by Misael Hirsch        Communicable Diseases (eg STDs):     Last Reviewed on 10/03/2017 01:52 PM by Misael Hirsch            Allergies:     Last Reviewed on 6/27/2018 08:38 AM by Rosalinda Leyva      No Known Drug Allergies.     Atorvastatin Calcium: (Adverse Reaction)        Current Medications:     Last Reviewed on 6/27/2018 08:39 AM by Rosalinda Leyva    Levothyroxine Sodium 0.175mg Tablet Take 1 tablet(s) by mouth daily     Hydrochlorothiazide (HCTZ) 25mg Tablet 1 tab daily     Lorazepam 0.5mg Tablet 1 PO BID PRN     Singulair  10mg Tablet 1 tab daily     Vitamin D3 5,000IU Capsules 1 capsule every day     Fluticasone Propionate 50mcg/1actuation Nasal Spray 1 or 2 sprays in each nostril qday prn     Zyrtec 10mg Tablet 1 tab daily     Aspirin (ASA) 81mg Tablets, Enteric Coated 1 tab daily      calcium chewables     OSTAFLEX FOR BONES ONCE DAILY         OBJECTIVE:        Vitals:         Current: 6/27/2018 8:38:02 AM    Ht:  5 ft, 9.5 in;  Wt: 274.5 lbs;  BMI: 40.0    T: 98.6 F (oral);  BP: 125/80 mm Hg (left arm, sitting);  P: 114 bpm (left arm (BP Cuff), sitting);  sCr: 0.66 mg/dL;  GFR: 117.59        Exams:     PHYSICAL EXAM:     GENERAL: vital signs recorded - well developed, well nourished;  no apparent distress;     EYES: PERRL, EOMI     E/N/T: EARS:  normal external auditory canals and tympanic membranes;  grossly normal hearing; NOSE: nasal mucosa is partially obscured by clear drainage, edematous, and erythematous;  turbinates are mildly swollen bilaterally;  no sinus tenderness; OROPHARYNX:  normal mucosa, dentition, gingiva, and posterior pharynx;     NECK: range of motion is normal; thyroid is non-palpable;     RESPIRATORY: normal respiratory rate and pattern with no distress; normal breath sounds with no rales, rhonchi, wheezes or rubs;     CARDIOVASCULAR: normal rate; rhythm is regular;  no systolic murmur; trace pedal and 2+ pedal edema;     GASTROINTESTINAL: nontender, nondistended; no hepatosplenomegaly or masses; no bruits;     MUSCULOSKELETAL: gait: slowed; diffuse joint stiffness without erythema or edema or deformity of the joints.  Neg straight leg raise, 5/5 MS strength in LE B with normal symm sensory exam LE B         Lab/Test Results:             Urine temperature:  onfirmed (06/27/2018),     All urine drug screen levels confirmed negative:  yes (06/27/2018),     Date and time of last pill:  lorazepam 6-26-18 at 1600 (06/27/2018),     Performed by:  iman (06/27/2018),     Collection Time:  0927 (06/27/2018),             ASSESSMENT           719.49   M15.0  Diffuse arthralgia              DDx:     300.02   F41.9  Anxiety              DDx:     724.2   M54.5  Chronic low back pain              DDx:     401.1   I10  Hypertension              DDx:     466.0   J20.9  Acute bronchitis               DDx:     710.0   M32.10  Acute lupus              DDx:     V58.69   Z79.899  Use of high risk medications              DDx:     268.9   E55.9  Vitamin D deficiency, unspecified              DDx:     272.0   E78.00  Essential hypercholesterolemia              DDx:     244.8   E03.8  Acquired hypothyroidism              DDx:     401.1   I10  Hypertension              DDx:         ORDERS:         Meds Prescribed:       Meloxicam 15mg Tablet 1 tab daily  #90 (Ninety) tablet(s) Refills: 0       Refill of: Levothyroxine Sodium 0.175mg Tablet Take 1 tablet(s) by mouth daily  #90 (Ninety) tablet(s) Refills: 0       Refill of: Hydrochlorothiazide (HCTZ) 25mg Tablet 1 tab daily  #90 (Ninety) tablet(s) Refills: 1       Refill of: Lorazepam 0.5mg Tablet 1 PO BID PRN  #60 (Sixty) tablet(s) Refills: 2       Refill of: Singulair  (Montelukast Sodium) 10mg Tablet 1 tab daily  #90 (Ninety) tablet(s) Refills: 1       Refill of: Vitamin D3 5,000IU Capsules 1 capsule every day  #100 (One [place]) capsule(s) Refills: 1       Refill of: Fluticasone Propionate 50mcg/1actuation Nasal Spray 1 or 2 sprays in each nostril qday prn  #16 (Sixteen) gm Refills: 5       Albuterol 0.083% Nebulizer Solution 1 vial q 4 hours prn  #60 (Sixty) vial(s) Refills: 0       Augmentin (Amoxicillin/Clavulanate) 875mg/125mg Tablet 1 tab bid  #20 (Twenty) tablet(s) Refills: 0       Breo Ellipta (Fluticasone Furoate/Vilanterol) 200mcg/25mcg Inhalation Powder Take 1 inhalation(s) by mouth daily  #1 (One) inhaler(s) Refills: 0         Lab Orders:       [id number]  Drug test prsmv read direct optical obs pr date  (In-House)         03065  VITD - [facility] Vitamin D, 25 Hydroxy  (Send-Out)         59014  PHYSF - [facility] PHYSICAL: CMP, CBC, TSH, LIPID: [id number], [id number], [id number], [id number]  (Send-Out)         61785  BENCU - H [id number] BENZODIAZEPINES  (Send-Out)                   PLAN:          Diffuse arthralgia new dx of lupus-she is seeing rheumatology in July          Anxiety stable  on meds, this is her face to face, she is trying to take only 1 pill a day           Prescriptions:       Meloxicam 15mg Tablet 1 tab daily  #90 (Ninety) tablet(s) Refills: 0          Chronic low back pain will try meloxicam          Hypertension stable          Acute bronchitis continue trying low carb diet and exercise           Prescriptions:       Refill of: Levothyroxine Sodium 0.175mg Tablet Take 1 tablet(s) by mouth daily  #90 (Ninety) tablet(s) Refills: 0       Refill of: Hydrochlorothiazide (HCTZ) 25mg Tablet 1 tab daily  #90 (Ninety) tablet(s) Refills: 1       Refill of: Lorazepam 0.5mg Tablet 1 PO BID PRN  #60 (Sixty) tablet(s) Refills: 2       Refill of: Singulair  (Montelukast Sodium) 10mg Tablet 1 tab daily  #90 (Ninety) tablet(s) Refills: 1       Refill of: Vitamin D3 5,000IU Capsules 1 capsule every day  #100 (One [place]) capsule(s) Refills: 1       Refill of: Fluticasone Propionate 50mcg/1actuation Nasal Spray 1 or 2 sprays in each nostril qday prn  #16 (Sixteen) gm Refills: 5       Albuterol 0.083% Nebulizer Solution 1 vial q 4 hours prn  #60 (Sixty) vial(s) Refills: 0       Augmentin (Amoxicillin/Clavulanate) 875mg/125mg Tablet 1 tab bid  #20 (Twenty) tablet(s) Refills: 0       Breo Ellipta (Fluticasone Furoate/Vilanterol) 200mcg/25mcg Inhalation Powder Take 1 inhalation(s) by mouth daily  #1 (One) inhaler(s) Refills: 0          Acute lupus rheumatology appt sched July 21st          Use of high risk medications     LABORATORY:  Labs ordered to be performed today include Drug Screen Urine [facility] Confirmation BENZODIAZEPINES.      RECOMMENDATIONS given include: Push Fluids, Rest, Follow up if no improvement or worsening symptoms like high fevers, vomiting, weakness, or increasing shortness of air.    .            Orders:       25220  Drug test prsmv read direct optical obs pr date  (In-House)         91533  BENCU - [facility] [id number] BENZODIAZEPINES  (Send-Out)            Vitamin D deficiency, unspecified on  5000 units a day     LABORATORY:  Labs ordered to be performed today include Vitamin D.            Orders:       27077  VITD - H Vitamin D, 25 Hydroxy  (Send-Out)            Essential hypercholesterolemia not on any meds, diet controlled     LABORATORY:  Labs ordered to be performed today include PHYSICAL PANEL; CMP, CBC, TSH, LIPID.            Orders:       [id number]  PHYSF - [facility] PHYSICAL: CMP, CBC, TSH, LIPID: [id number], [id number], [id number], 11095  (Send-Out)            Acquired hypothyroidism repeat labs today          Hypertension well controlled             CHARGE CAPTURE           **Please note: ICD descriptions below are intended for billing purposes only and may not represent clinical diagnoses**        Primary Diagnosis:         719.49 Diffuse arthralgia            M15.0    Primary generalized (osteo)arthritis              Orders:          08705   Office/outpatient visit; established patient, level 4  (In-House)           300.02 Anxiety            F41.9    Anxiety disorder, unspecified    724.2 Chronic low back pain            M54.5    Low back pain    401.1 Hypertension            I10    Essential (primary) hypertension    466.0 Acute bronchitis            J20.9    Acute bronchitis, unspecified    710.0 Acute lupus            M32.10    Systemic lupus erythematosus, organ or system involvement unspecified    V58.69 Use of high risk medications            Z79.899    Other long term (current) drug therapy              Orders:          22070   Drug test prsmv read direct optical obs pr date  (In-House)           268.9 Vitamin D deficiency, unspecified            E55.9    Vitamin D deficiency, unspecified    272.0 Essential hypercholesterolemia            E78.00    Pure hypercholesterolemia, unspecified    244.8 Acquired hypothyroidism            E03.8    Other specified hypothyroidism    401.1 Hypertension            I10    Essential (primary) hypertension

## 2021-05-18 NOTE — PROGRESS NOTES
Yulissa Spaulding  1955     Office/Outpatient Visit    Visit Date: Thu, Jun 18, 2020 03:02 pm    Provider: Rayne Gee MD (Assistant: Jazz Escobar MA)    Location: [facility]        Electronically signed by Rayne Gee MD on  06/25/2020 03:01:22 PM                             Subjective:        CC: medicare wellness exam    HPI:           Mrs. Spaulding is here for a Medicare wellness visit.  The required HRA questions are integrated within this visit note. Family medical history and individual medical/surgical history were reviewed and updated.  A current height, weight, BMI, blood pressure, and pulse were recorded in the vitals section of the note and have been reviewed. Patient's medications, including supplements, were recorded in the chart and reviewed.  Current providers and suppliers were reviewed and updated.          Self-Assessment of Health: She rates her health as poor. She rates her confidence of being able to control/manage most of her health problems as somewhat confident. Her physical/emotional health has limited her social activites quite a bit.  A review of cognitive impairment was performed, including ability to drive a car, manage finances, and any memory changes, and was found to be negative.  A review of functional ability, including bathing, dressing, walking, and urine/bowel continence as well as level of safety was performed and was found to be negative.  Falls Risk: Has not had any falls or only one fall without injury in the past year.  She denies having trouble hearing the TV/radio when others do not, having to strain to hear or understand conversations and wearing hearing aid(s).  Concerning home safety, she reports that at home she DOES have adequate lighting, handrails on stairs, functioning smoke alarms and absence of throw rugs, but not a skid resistant shower/tub or grab bars in the bath.          Immunization Status: ** >10 years since last Td  booster; ** Has not received pneumococcal vaccination; Age>60, no shingles vaccination; Physical Activity: She never excercises.; Type of diet patient normally eats is described as poor--needs improvement.  Tobacco: She has never smoked.  Preventative Health updated today           PHQ-9 Depression Screening: Completed form scanned and in chart; Total Score 5           In regard to the other specified hypothyroidism, this was first diagnosed >10 years ago.  She is currently taking Levothyroid, 175 mcg daily.  TSH was last checked 6 months ago.  The result was reported as normal.  She denies any related symptoms.  She reports no symptoms suggestive of adverse medication effect.            Dx with essential (primary) hypertension; her current cardiac medication regimen includes a diuretic ( HCTZ ) and a beta-blocker.  Compliance with treatment has been good; she takes her medication as directed.  She is tolerating the medication well without side effects.  Mrs. Spaulding does not check her blood pressure other than at her clinic appointments.        Yulissa is in severe pain in her back and she is seeing the chiropracter Dr Zhou who took xrays of her L spine and told her that her back was too far gone and he could not help her and sent her back to me with her xray.         chronic knee pain with her being told by ortho she is having an allergy to her metal, and she finally met up with her ortho and her special metal sensitive and cement free one are unavailable as a combined joint-so she is out of luck for now    ROS:     CONSTITUTIONAL:  Negative for chills, fatigue, fever, and weight change.      EYES:  Negative for blurred vision, eye pain, and photophobia.      E/N/T:  Negative for hearing problems, E/N/T pain, congestion, rhinorrhea, epistaxis, hoarseness, and dental problems.      CARDIOVASCULAR:  Negative for chest pain, palpitations, tachycardia, orthopnea, and edema.      RESPIRATORY:  Negative for cough,  dyspnea, and hemoptysis.      GASTROINTESTINAL:  Negative for abdominal pain, heartburn, constipation, diarrhea, and stool changes.      GENITOURINARY:  Negative for genital lesions, hematuria, menstrual problems, polyuria, abnormal vaginal bleeding, and vaginal discharge.      MUSCULOSKELETAL:  Negative for arthralgias, back pain, and myalgias.      INTEGUMENTARY/BREAST:  Negative for atypical moles, dry skin, pruritis, rashes, breast masses, and nipple discharge.      NEUROLOGICAL:  Negative for dizziness, headaches, paresthesias, and weakness.      HEMATOLOGIC/LYMPHATIC:  Negative for easy bruising, bleeding, and lymphadenopathy.      ENDOCRINE:  Negative for hair loss, heat/cold intolerance, polydipsia, and polyphagia.      ALLERGIC/IMMUNOLOGIC:  Negative for allergies, frequent illnesses, HIV exposure, and urticaria.      PSYCHIATRIC:  Negative for anxiety, depression, and sleep disturbances.          Past Medical History / Family History / Social History:         Last Reviewed on 2020 03:46 PM by Rayne Gee    Past Medical History:             PAST MEDICAL HISTORY         Positive for    Hyperlipidemia and    Hypertension;     atherosclerosis     Positive for    Hypothyroidism;     Positive for    Allergies;     Positive for    benign breast lump;     Positive for    Depression and    Voice Box Dysfunction;         GYNECOLOGICAL HISTORY:     miscarriage 3    1 DNC         CURRENT MEDICAL PROVIDERS:    Cardiologist: Dr. Nolasco    Gastroenterologist: Dr. Eun Gasca    Orthopedist: Dr Braxton Crawford MD ([facility])    Rheumatologist: Dr. Nigel Webster         PREVENTIVE HEALTH MAINTENANCE             BONE DENSITY: was last done 10/16/17 with normal results     COLORECTAL CANCER SCREENING: Up to date (colonoscopy q10y; sigmoidoscopy q5y; Cologuard q3y) was last done 2020, Results are in chart; colonoscopy with the following abnormalities noted-- Polyp(s) and repeat 3 years     EYE EXAM:  was last done ____ (enter month/year) >10 YEARS AGO     MAMMOGRAM: Done within last 2 years and results in are chart was last done 01- with normal results     PAP SMEAR: was last done 01/04/2019 with normal results         Surgical History:         Hernia Repair: umbilical;     GASTRIC SLEEVE SURGERY     Cholecystectomy    Hysterectomy: 2005, SUPRACERVICAL , OOPHERECTOMY BILATERAL;     DNC;    BENIGN BREAST MASS REMOVED LEFT;     EGD 11/2011 normal;    Bladder repair 3/2005;         Family History:         Mother: COPD     Positive for Coronary Artery Disease ( father; mother ), Hyperlipidemia ( father ) and Hypertension ( father ).          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          Alcohol: Frequency: Once a week         Substance Abuse History:     Last Reviewed on 10/19/2018 02:31 PM by Taylor Bunn        Mental Health History:     Last Reviewed on 10/19/2018 02:31 PM by Taylor Bunn        Communicable Diseases (eg STDs):     Last Reviewed on 10/19/2018 02:31 PM by Taylor Bunn        Immunizations:     Prevnar 13 (Pneumococcal PCV 13) 1/4/2019    zzFluzone pf-quadrivalent 3 and up 10/3/2017    Fluvirin (4 + years dose) 10/15/2014    Fluzone Quadrivalent (3+ years) 10/1/2018    Fluzone High-Dose pf (>=65 yr) 10/2/2019        Allergies:     Last Reviewed on 6/18/2020 03:10 PM by Jazz Escobar    Atorvastatin Calcium:   (Adverse Reaction)    band aides:      methyl methacrylate:      hydroxyethly methacrylate:      ethyl acrylate:      Bone Cement:          Current Medications:     Last Reviewed on 6/18/2020 03:10 PM by Jazz Escobar    levothyroxine 175 mcg oral tablet [Take 1 tablet(s) by mouth daily]    Pristiq 100 mg oral Tablet, Extended Release 24 hr [Take 1 tablet(s) by mouth daily]     hydroCHLOROthiazide 25 mg oral tablet [1 tab daily]    Singulair 10 mg oral tablet [1 tab daily]    Zyrtec 10 mg oral tablet [1 tab daily]    cholecalciferol (vitamin D3) 125 mcg (5,000 unit) oral capsule [1 capsule every day]    diclofenac sodium 1 % Topical Gel [Apply 2 gm(s) to affected area qid prn pain]    calcium chewables     Breo Ellipta 200-25 mcg/dose Inhalation Blister, With Inhalation Device [Take 1 inhalation(s) by mouth daily]    Cartia  mg oral Capsule, Extended Release 24 hr [Take 1 capsule(s) by mouth daily]    Eliquis 5 mg oral tablet [take 1 tab BID]    Melatonin 5 mg oral tablet [4 tabs HS]    busPIRone 15 mg oral tablet [Take 1 tablet(s) by mouth bid]        Objective:        Vitals:         Current: 6/18/2020 3:14:42 PM    Ht:  5 ft, 9.5 in;  Wt: 256.8 lbs;  BMI: 37.4T: 97.7 F (oral);  BP: 117/70 mm Hg (left arm, sitting);  P: 81 bpm (left arm (BP Cuff), sitting);  sCr: 0.78 mg/dL;  GFR: 94.26VA: 20/25 OD, 20/30 OS (near, with correction)        Exams:     PHYSICAL EXAM:     GENERAL: vital signs recorded - well developed, well nourished;  no apparent distress;     EYES: extraocular movements intact; conjunctiva and cornea are normal; PERRLA;     E/N/T:  normal EACs, TMs, nasal/oral mucosa, teeth, gingiva, and oropharynx;     NECK: range of motion is normal; thyroid is non-palpable;     RESPIRATORY: normal respiratory rate and pattern with no distress; normal breath sounds with no rales, rhonchi, wheezes or rubs;     CARDIOVASCULAR: normal rate; rhythm is regular;  no systolic murmur; no edema;     GASTROINTESTINAL: nontender; normal bowel sounds; no organomegaly;     LYMPHATIC: no enlargement of cervical or facial nodes; no supraclavicular nodes;     BREAST/INTEGUMENT: BREASTS: breast exam is normal without masses, skin changes, or nipple discharge; SKIN: no significant rashes or lesions; no suspicious moles;     MUSCULOSKELETAL:  Normal range of motion, strength and tone;      NEUROLOGICAL:  cranial nerves, motor and sensory function, reflexes, gait and coordination are all intact;     PSYCHIATRIC:  appropriate affect and demeanor; normal speech pattern; grossly normal memory;         Lab/Test Results:         Glucose, Urine: Neg (06/18/2020),     Bilirubin, urine: Negative (06/18/2020),     Ketones, Urine Strip: Negative (06/18/2020),     Specific Gravity, urine: 1.030 (06/18/2020),     Blood in Urine: trace (06/18/2020),     pH, urine: 5.5 (06/18/2020),     Protein Urine QL: negative (06/18/2020),     Urobilinogen, urine: 0.2 E.U./dL (06/18/2020),     Nitrite, Urine: Negative (06/18/2020),     Leukoctyes, urine: Trace (06/18/2020),     Appearance: Clear (06/18/2020),     collection source: Clean-catch (06/18/2020),     Color: Yellow (06/18/2020),     Performed by:: AS (06/18/2020),     Alkaline Phosphatase: 96 (U/L) (10/02/2019),     ALT (SGPT): 11 (U/L) (10/02/2019),     AST (SGOT): 17 (U/L) (10/02/2019),     CHOL/HDLC RATIO: 4.0 (NA) (10/02/2019),     Creatinine, Serum: 0.78 (mg/dl) (10/02/2019),     HDL: 51 (mg/dL) (10/02/2019),     Hematocrit: 44.8 (%) (07/17/2019),     Hemoglobin: 14.70 (gm/dl) (07/17/2019),     LDL: 119 (mg/dL) (10/02/2019),     Total Cholesterol: 204 (mg/dL) (10/02/2019),     Triglycerides: 172 (mg/dL) (10/02/2019),     TSH: 0.091 (mIU/L) (10/02/2019),     VLDL Cholesterol: 34 (mg/dl) (10/02/2019),     Urine temperature: confirmed (06/18/2020),     All urine drug screen levels confirmed negative: yes (06/18/2020),     Date and time of last pill: Hydrocodone - several days/AS (06/18/2020),     Performed by: ag (06/18/2020),     Collection Time: 1630 (06/18/2020),     Alkaline Phosphatase, Serum: 96 (U/L) (10/02/2019),             Procedures:     Low back pain    1. Kenalog 40 mg given IM in the right hip; administered by AS;  lot number [id number]; expires 01/2022             Assessment:         Z00.00   Encounter for general adult medical examination without  abnormal findings       Z13.31   Encounter for screening for depression       E03.8   Other specified hypothyroidism       F32.0   Major depressive disorder, single episode, mild       I10   Essential (primary) hypertension       Z79.899   Other long term (current) drug therapy       M54.5   Low back pain       M25.562   Pain in left knee       Z78.0   Asymptomatic menopausal state           ORDERS:         Meds Prescribed:       [Refilled] HYDROcodone-acetaminophen 5-325 mg oral tablet [1 at hs PRN], #30 (thirty) tablets, Refills: 0 (zero)         Radiology/Test Orders:       88541  Bone density study; dual photon absorp  (Send-Out)            73297  Magnetic resonance imaging, spinal canal and contents, lumbar; without contrast  (Send-Out)            3017F  Colorectal CA screen results documented and reviewed (PV)  (In-House)              Lab Orders:       [id number]  Urinalysis, automated, without microscopy  (In-House)            63337  TSH - H TSH  (Send-Out)            31969  Drug test prsmv read direct optical obs pr date  (In-House)              Procedures Ordered:         Annual wellness visit, includes a PPPS, subsequent visit  (In-House)              Other Orders:         Depression screen positive and follow up plan documented  (In-House)              Screening mammogram results documented  (Send-Out)            58311  Therapeutic injection  (In-House)              Kenalog, per 10 mg  (x4)                  Plan:         Encounter for general adult medical examination without abnormal findingsMEDICARE WELLNESS EXAM-SHE IS DUE FOR MAMMOGRAM, UTD on  DEXA/COLONOSCOPY, PAP AND PELVIC EXAM PERFORMED TODAY, UTD ON VACCINATIONS INCLUDING: FLU, GIVEN PREVNAR TODAY, DUE FOR SHINGLES VACCINE/PNEUMOVAX/TD VACCINE, MINIMAL FALL RISK, NO MEMORY ISSUES, NO SIGNS/SYMPTOMS OF DEPRESSION, SHE LIVES WITH HER , SHE IS ABLE TO DRIVE AND PERFORM ADL'S/FINANCES INDEPENDENTLY, HEARING IS NOT ADEQUATE,  SHE WAS GIVEN A HA ON A LIVING WILL AND A PREV SERVICES HANDOUT AND SAFETY HANDOUT WERE GIVEN TO HER.  CURRENT DOCTOR LIST UPDATED         ADVANCED DIRECTIVES: None               Orders:       [id number]  Urinalysis, automated, without microscopy  (In-House)              Annual wellness visit, includes a PPPS, subsequent visit  (In-House)              Encounter for screening for depression    MIPS PHQ-9 Depression Screening: Completed form scanned and in chart; Total Score 5 Positive Depression Screen: Stable on medications. No suicidal ideation.            Orders:         Depression screen positive and follow up plan documented  (In-House)              Screening mammogram results documented  (Send-Out)            3017F  Colorectal CA screen results documented and reviewed (PV)  (In-House)              Other specified hypothyroidism    LABORATORY:  Labs ordered to be performed today include TSH.            Orders:       [id number]  TSH - [facility] TSH  (Send-Out)              Major depressive disorder, single episode, mildshe is stable but continues to be overwhelmed with her pain and health issues        Essential (primary) hypertensionwell contolled, labs UTD, done at [place], chol 207        Other long term (current) drug therapy        RECOMMENDATIONS given include: divya reviewed, drug screen performed and appropriate, consent is reviewed and signed and on the chart, she is aware of risk of addiction on this medication and understands that she will need to follow up for a review every 3 months and her medications will be adjusted or decreased as deemed appropriate at each visit.  No personal history of drug or alcohol abuse.  No concerns about diversion or abuse.  She denies side effects related to the medication.  She is  aware that she may be called in for pill counts..            Orders:       [id number]  Drug test prsmv read direct optical obs pr date  (In-House)              Low back painsevere pain with burning  R LE radiculopathy and PN pain as well as R leg weakness        RADIOLOGY:  I have ordered MRI Lumbar Spine w/o contrast to be done today.  Steroids Kenalog 40 mg 1 ml           Orders:       36984  Magnetic resonance imaging, spinal canal and contents, lumbar; without contrast  (Send-Out)            10764  Therapeutic injection  (In-House)              Kenalog, per 10 mg  (x4)          Pain in left kneeongoing, she has paulo allergy and is waiting for a knee replacement that is paulo free-needs to be FDA approved          Prescriptions:       [Refilled] HYDROcodone-acetaminophen 5-325 mg oral tablet [1 at hs PRN], #30 (thirty) tablets, Refills: 0 (zero)         Asymptomatic menopausal state          Orders:       32138  Bone density study; dual photon absorp  (Send-Out)                  Charge Capture:         Primary Diagnosis:     Z00.00  Encounter for general adult medical examination without abnormal findings           Orders:      00054  Preventive medicine, established patient, age 65+ years  (In-House)            03430  Urinalysis, automated, without microscopy  (In-House)              Annual wellness visit, includes a PPPS, subsequent visit  (In-House)              Z13.31  Encounter for screening for depression           Orders:      [id number]  Office/outpatient visit; established patient, level 4  (In-House)              Depression screen positive and follow up plan documented  (In-House)            3017F  Colorectal CA screen results documented and reviewed (PV)  (In-House)              E03.8  Other specified hypothyroidism     F32.0  Major depressive disorder, single episode, mild     I10  Essential (primary) hypertension     Z79.899  Other long term (current) drug therapy           Orders:      34146  Drug test prsmv read direct optical obs pr date  (In-House)              M54.5  Low back pain           Orders:      36929  Therapeutic injection  (In-House)              Kenalog, per  10 mg  (x4)          M25.562  Pain in left knee     Z78.0  Asymptomatic menopausal state

## 2021-05-18 NOTE — PROGRESS NOTES
Yulissa Spaulding 1955     Office/Outpatient Visit    Visit Date:  08:34 am    Provider: Rayne Gee MD (Assistant: Taniya Pozo MA)    Location: [facility]        Electronically signed by Rayne Gee MD on  2018 09:18:20 AM                             SUBJECTIVE:        CC: f/u for obesity  and anxiety         HPI:     Yulissa is in today for counseling on diet and exercise-she was counseled for over 1/2 her visit on diet, chava PlastiPure Pearson diet, avoid calories in her drinks, eat increased protein/fruit and vegetable. She is unable to exercise due to OA knees.   She could not afford the diet pills xenical nor contrave. I gave her a HA on diet and she is to keep a food diary     Yulissa is doing ok with her anxiety today, she is still on lorazepam and pristiq since she couldnt afford the contrave (we were changing these meds to wellbutrin in the contrave and hydroxyzine), so she stayed on lorazepam and pristiq and she is doing.      ROS:     CONSTITUTIONAL:  Positive for fatigue.   Negative for chills or fever.      E/N/T:  Negative for ear pain and sore throat.      CARDIOVASCULAR:  Negative for chest pain, orthopnea, paroxysmal nocturnal dyspnea and pedal edema.      RESPIRATORY:  Negative for dyspnea.      GASTROINTESTINAL:  Negative for abdominal pain, constipation, diarrhea, nausea and vomiting.      PSYCHIATRIC:  Negative for anxiety, depression, and sleep disturbances.          PMH/FMH/SH:     Last Reviewed on 2018 09:15 AM by Rayne Gee    Past Medical History:             PAST MEDICAL HISTORY         Positive for    Hyperlipidemia and    Hypertension;     atherosclerosis    allergies     Positive for    Hypothyroidism;     Positive for    benign breast lump;         GYNECOLOGICAL HISTORY:     miscarriage 3    1 DNC     DEPRESSION    VOICE BOX DYSFUNCTION HTN    ALLERGIC RHINITIS         PREVENTIVE HEALTH MAINTENANCE             BONE DENSITY: was  last done 4/2015 with normal results     MAMMOGRAM: Done within last 2 years and results in are chart was last done 5/2016 with the following abnormalaties noted-- normal after diagonistic images         Surgical History:         Hernia Repair: umbilical;     GASTRIC SLEEVE SURGERY      Cholecystectomy    Hysterectomy: 2005, SUPRACERVICAL , OOPHERECTOMY BILATERAL;     DNC;    BENIGN BREAST MASS REMOVED LEFT;      EGD 11/2011 normal;    Bladder repair 3/2005;         Family History:         Mother: COPD     Positive for Coronary Artery Disease ( father; mother ), Hyperlipidemia ( father ) and Hypertension ( father ).          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          Alcohol: Frequency: Once a week         Substance Abuse History:     Last Reviewed on 10/03/2017 01:52 PM by Misael Hirsch        Mental Health History:     Last Reviewed on 10/03/2017 01:52 PM by Misael Hirsch        Communicable Diseases (eg STDs):     Last Reviewed on 10/03/2017 01:52 PM by Misael Hirsch            Allergies:     Last Reviewed on 2/06/2018 08:37 AM by Taniya Pozo      No Known Drug Allergies.     Atorvastatin Calcium: (Adverse Reaction)        Current Medications:     Last Reviewed on 2/06/2018 08:39 AM by Taniya Pozo    Levothyroxine Sodium 150mcg Capsules Take 1 capsule(s) by mouth daily     Hydrochlorothiazide (HCTZ) 25mg Tablet 1 tab daily     Lorazepam 0.5mg Tablet 1 PO BID PRN     Singulair  10mg Tablet 1 tab daily     Fluticasone Propionate 50mcg/1actuation Nasal Spray 1 or 2 sprays in each nostril qday prn     Vitamin D3 5,000IU Capsules 1 capsule every day     Zyrtec 10mg Tablet 1 tab daily     Aspirin (ASA) 81mg Tablets, Enteric Coated 1 tab daily     calcium chewables     OSTAFLEX FOR BONES ONCE DAILY      Pristiq 25mg Tablets, Extended Release 2 pills daily for 1 week, then 1 pill daily for one week, then stop         OBJECTIVE:        Vitals:         Current: 2/6/2018 8:36:53 AM    Ht:  5 ft, 9.5 in;  Wt: 269 lbs;  BMI: 39.2    T: 97.9 F (oral);  BP: 138/88 mm Hg (left arm, sitting);  P: 80 bpm (left arm (BP Cuff), sitting);  sCr: 0.66 mg/dL;  GFR: 116.59        Exams:     PHYSICAL EXAM:     GENERAL: vital signs recorded - well developed, well nourished;  no apparent distress;     EYES: PERRL, EOMI     E/N/T: NOSE: nasal mucosa is dry;  OROPHARYNX:  normal mucosa, dentition, gingiva, and posterior pharynx;     NECK: range of motion is normal; thyroid is non-palpable;     RESPIRATORY: normal respiratory rate and pattern with no distress; normal breath sounds with no rales, rhonchi, wheezes or rubs;     CARDIOVASCULAR: normal rate; rhythm is regular;  no systolic murmur; trace pedal and 2+ pedal edema;     GASTROINTESTINAL: nontender, nondistended; no hepatosplenomegaly or masses; no bruits;     MUSCULOSKELETAL: Right knee tenderness with mild joint swelling and warmth, FROM to right knee;         Lab/Test Results:             Amphetamines Screen, Urin:  Negative (02/06/2018),     BAR-Barbiturates Screen, Urin:  Negative (02/06/2018),     Buprenorphine:  Negative (02/06/2018),     BZO-Benzodiazepines Screen,Ur:  Positive (02/06/2018),     Cocaine(Metab.)Screen, Ur:  Negative (02/06/2018),     MDMA-Ecstasy:  Negative (02/06/2018),     Met-Methamphetamine:  Negative (02/06/2018),     MTD-Methadone Screen, Urine:  Negative (02/06/2018),     Opiate Screen, Urine:  Negative (02/06/2018),     OXY-Oxycodone:  Negative (02/06/2018),     PCP-Phencyclidine Screen, Uri:  Negative (02/06/2018),     THC Cannabinoids Screen, Urin:  Negative (02/06/2018),     Urine temperature:  confirmed (02/06/2018),     Date and time of last pill:  Lorazepam 2/6/18@7am (02/06/2018),     Performed by:  tls (02/06/2018),     Collection Time:   9:15 (02/06/2018),             ASSESSMENT           V65.3   Z71.3  Weight loss program, follow-up              DDx:     300.02   F41.9  Anxiety              DDx:     244.8   E03.8  Acquired hypothyroidism              DDx:     401.1   I10  Hypertension              DDx:     V58.69   Z79.899  Use of high risk medications              DDx:     272.0   E78.00  Essential hypercholesterolemia              DDx:         ORDERS:         Meds Prescribed:       Refill of: Hydrochlorothiazide (HCTZ) 25mg Tablet 1 tab daily  #90 (Ninety) tablet(s) Refills: 1       Refill of: Lorazepam 0.5mg Tablet 1 PO BID PRN  #60 (Sixty) tablet(s) Refills: 2       Refill of: Singulair  (Montelukast Sodium) 10mg Tablet 1 tab daily  #90 (Ninety) tablet(s) Refills: 1       Refill of: Vitamin D3 5,000IU Capsules 1 capsule every day  #100 (One Keymar) capsule(s) Refills: 1       Refill of: Pristiq (Desvenlafaxine) 100mg Tablets, Extended Release Take 1 tablet(s) by mouth daily  #90 (Ninety) tablet(s) Refills: 1         Lab Orders:       [id number]  Drug test prsmv read direct optical obs pr date  (In-House)                   PLAN:          Weight loss program, follow-up counseled on diet and exercise          Anxiety stable on meds, meds refilled          Acquired hypothyroidism was high-levothyroxine was increased          Hypertension stable           Prescriptions:       Refill of: Hydrochlorothiazide (HCTZ) 25mg Tablet 1 tab daily  #90 (Ninety) tablet(s) Refills: 1       Refill of: Lorazepam 0.5mg Tablet 1 PO BID PRN  #60 (Sixty) tablet(s) Refills: 2       Refill of: Singulair  (Montelukast Sodium) 10mg Tablet 1 tab daily  #90 (Ninety) tablet(s) Refills: 1       Refill of: Vitamin D3 5,000IU Capsules 1 capsule every day  #100 (One Keymar) capsule(s) Refills: 1       Refill of: Pristiq (Desvenlafaxine) 100mg Tablets, Extended Release Take 1 tablet(s) by mouth daily  #90 (Ninety) tablet(s) Refills: 1          Use of high risk medications          RECOMMENDATIONS given include: divya reviewed, drug screen performed and appropriate, consent is reviewed and signed and on the chart, she is aware of risk of addiction on this medication and understands that she will need to follow up for a review every 3 months and her medications will be adjusted or decreased as deemed appropriate at each visit.  No personal history of drug or alcohol abuse.  No concerns about diversion or abuse.  She denies side effects related to the medication.  She is  aware that she may be called in for pill counts..            Orders:       [id number]  Drug test prsmv read direct optical obs pr date  (In-House)            Essential hypercholesterolemia she declines statin treatment at this time, I counseled her on diet and exercise             CHARGE CAPTURE           **Please note: ICD descriptions below are intended for billing purposes only and may not represent clinical diagnoses**        Primary Diagnosis:         V65.3 Weight loss program, follow-up            Z71.3    Dietary counseling and surveillance              Orders:          [id number]   Office/outpatient visit; established patient, level 4  (In-House)           300.02 Anxiety            F41.9    Anxiety disorder, unspecified    244.8 Acquired hypothyroidism            E03.8    Other specified hypothyroidism    401.1 Hypertension            I10    Essential (primary) hypertension    V58.69 Use of high risk medications            Z79.899    Other long term (current) drug therapy              Orders:          97221   Drug test prsmv read direct optical obs pr date  (In-House)           272.0 Essential hypercholesterolemia            E78.00    Pure hypercholesterolemia, unspecified

## 2021-05-18 NOTE — PROGRESS NOTES
Yulissa Spaulding  1955     Office/Outpatient Visit    Visit Date: Tue, Oct 13, 2020 12:09 pm    Provider: Rayne Gee MD (Assistant: Verena Espinal MA)    Location: [facility]        Electronically signed by Rayne Gee MD on  10/15/2020 10:07:56 AM                             Subjective:        CC: Mrs. Spaulding is a 64 year old White female.  This is a follow-up visit.  ongoing knee pain L knee, med refilled;         HPI:       Yulissa had L knee replacement with Dr Justin  Sept 2018 and found out she has an allergy to the cement and she has been waiting to have this replaced with a cementless joint and she is sched to have this done with edith Staples in [facility] on Oct 27ths and she needs her pre op today for surgical clearance.  3 months ago, and she is still in moderate pain with significant swelling of the L knee and she wants  a second opinion.  Her surgeon is Dr Justin and she has a f/u appt with him this Friday, she completed PT last week          Additionally, she presents with history of pure hypercholesterolemia, unspecified.  date of diagnosis 2015.  Current treatment includes a low cholesterol/low fat diet.  Compliance with treatment has been fair; she does not follow a diet and exercise regimen.  Most recent lab tests include Total Cholesterol:  204 (mg/dL) (10/02/2019), HDL:  51 (mg/dL) (10/02/2019), Triglycerides:  172 (mg/dL) (10/02/2019), LDL:  119 (mg/dL) (10/02/2019), TSH:  0.445 (mIU/L) (06/18/2020), Creatinine, Serum:  0.78 (mg/dl) (10/02/2019), Glom Filt Rate, Est:  >60 (ml/min/1.73m2) (10/02/2019), Alkaline Phosphatase, Serum:  96 (U/L) (10/02/2019), ALT (SGPT):  11 (U/L) (10/02/2019), AST (SGOT):  17 (U/L) (10/02/2019).  SEVERE MUSCLE PAIN ON ATORVASTATIN-SHE DEFERS ANYMORE STATINS-WAS SEVERE           Additionally, she presents with history of other specified hypothyroidism.  this was first diagnosed >10 years ago.  She is currently  taking Levothyroid, 175 mcg daily.  TSH was last checked 6 months ago.  The result was reported as normal.  She denies any related symptoms.  She reports no symptoms suggestive of adverse medication effect.        chronic atrial fibrillation and she is stable and well controlled          Additionally, she presents with history of essential (primary) hypertension.  her current cardiac medication regimen includes a diuretic ( HCTZ ) and a beta-blocker.  Compliance with treatment has been good; she takes her medication as directed.  She is tolerating the medication well without side effects.  Mrs. Spaulding does not check her blood pressure other than at her clinic appointments.        chronic anxiety and she is stable on pristiq and lorazepam daily prn, she takes lorazepam once a day because she has so much stress with her knee pain and swelling.  She is sleeping well at night, denies active depression symptoms.  No suicidal ideation.  She is functional on her medication, no signs of diversion or abuse    ROS:     CONSTITUTIONAL:  Negative for chills, fatigue and fever.      E/N/T:  Negative for nasal congestion, hoarseness and sore throat.      CARDIOVASCULAR:  Negative for chest pain, palpitations, tachycardia, orthopnea, and edema.      RESPIRATORY:  Negative for cough, dyspnea, and hemoptysis.      GASTROINTESTINAL:  Negative for abdominal pain, constipation, diarrhea, melena, nausea and vomiting.      MUSCULOSKELETAL:  Positive for arthralgias (L knee pain).      INTEGUMENTARY/BREAST:  Negative for rash.      NEUROLOGICAL:  Negative for dizziness and headaches.      PSYCHIATRIC:  Negative for anxiety, depression, and sleep disturbances.          Past Medical History / Family History / Social History:         Last Reviewed on 6/18/2020 03:46 PM by Rayne Gee    Past Medical History:             PAST MEDICAL HISTORY         Positive for    Hyperlipidemia and    Hypertension;     atherosclerosis      Positive for    Hypothyroidism;     Positive for    Allergies;     Positive for    benign breast lump;     Positive for    Depression and    Voice Box Dysfunction;         GYNECOLOGICAL HISTORY:     miscarriage 3    1 DNC         CURRENT MEDICAL PROVIDERS:    Cardiologist: Dr. Nolasco    Gastroenterologist: Dr. uEn Gasca    Orthopedist: Dr Braxton Crawford MD ([facility])    Rheumatologist: Dr. Nigel Webster         PREVENTIVE HEALTH MAINTENANCE             BONE DENSITY: was last done 2020 with the following abnormality noted-- Bone density in the femoral necks has decreased by 6.6 percent compared to 2017     COLORECTAL CANCER SCREENING: Up to date (colonoscopy q10y; sigmoidoscopy q5y; Cologuard q3y) was last done 2020, Results are in chart; colonoscopy with the following abnormalities noted-- Polyp(s) and repeat 3 years     EYE EXAM: was last done ____ (enter month/year) >10 YEARS AGO     MAMMOGRAM: Done within last 2 years and results in are chart was last done 2019 with normal results     PAP SMEAR: was last done 2019 with normal results         Surgical History:         Hernia Repair: umbilical;     GASTRIC SLEEVE SURGERY     Cholecystectomy    Hysterectomy: , SUPRACERVICAL , OOPHERECTOMY BILATERAL;     DNC;    BENIGN BREAST MASS REMOVED LEFT;     EGD 2011 normal;    Bladder repair 3/2005;         Family History:         Mother: COPD     Positive for Coronary Artery Disease ( father; mother ), Hyperlipidemia ( father ) and Hypertension ( father ).          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          Alcohol: Frequency: Once a week         Substance Abuse History:     Last Reviewed on 10/19/2018 02:31 PM by Taylor Bunn        Mental Health History:     Last Reviewed on  10/19/2018 02:31 PM by Taylor Bunn        Communicable Diseases (eg STDs):     Last Reviewed on 10/19/2018 02:31 PM by Taylor Bunn        Allergies:     Last Reviewed on 6/18/2020 03:10 PM by Jazz Escobar    Atorvastatin Calcium:   (Adverse Reaction)    band aides:      methyl methacrylate:      hydroxyethly methacrylate:      ethyl acrylate:      Bone Cement:          Current Medications:     Last Reviewed on 10/13/2020 12:11 PM by Verena Espinal    hydroCHLOROthiazide 25 mg oral tablet [1 tab daily]    Singulair 10 mg oral tablet [1 tab daily]    levothyroxine 175 mcg oral tablet [Take 1 tablet(s) by mouth daily]    Pristiq 100 mg oral Tablet, Extended Release 24 hr [Take 1 tablet(s) by mouth daily]    Zyrtec 10 mg oral tablet [1 tab daily]    cholecalciferol (vitamin D3) 125 mcg (5,000 unit) oral capsule [1 capsule every day]    diclofenac sodium 1 % Topical Gel [Apply 2 gm(s) to affected area qid prn pain]    calcium chewables     Breo Ellipta 200-25 mcg/dose Inhalation Blister, With Inhalation Device [Take 1 inhalation(s) by mouth daily]    Eliquis 5 mg oral tablet [take 1 tab BID]    Cartia  mg oral Capsule, Extended Release 24 hr [Take 1 capsule(s) by mouth daily]    Melatonin 5 mg oral tablet [4 tabs HS]    HYDROcodone-acetaminophen 5-325 mg oral tablet [1 at hs PRN]    busPIRone 15 mg oral tablet [Take 1 tablet(s) by mouth bid]        Objective:        Vitals:         Current: 10/13/2020 12:13:42 PM    Ht:  5 ft, 9.5 in;  Wt: 253.2 lbs;  BMI: 36.9T: 96.7 F (temporal);  BP: 131/84 mm Hg (right arm, sitting);  P: 94 bpm (right arm (BP Cuff), sitting);  sCr: 0.78 mg/dL;  GFR: 93.70        Exams:     PHYSICAL EXAM:     GENERAL: vital signs recorded - well developed, well nourished;  no apparent distress;     EYES: extraocular movements intact; conjunctiva and cornea are normal; PERRLA;     E/N/T: OROPHARYNX:  normal mucosa, dentition, gingiva, and posterior pharynx;     NECK: range of motion  "is normal; thyroid is non-palpable;     RESPIRATORY: normal respiratory rate and pattern with no distress; normal breath sounds with no rales, rhonchi, wheezes or rubs;     CARDIOVASCULAR: normal rate; rhythm is regular;  no systolic murmur; no edema;     GASTROINTESTINAL: nontender; normal bowel sounds; no organomegaly;     MUSCULOSKELETAL: gait: affected by a left leg limp;  L knee-2+ edema, no erythema, poor extension and flexion, incision C/D/I;     NEUROLOGICAL:  cranial nerves, motor and sensory function, reflexes, gait and coordination are all intact;         Lab/Test Results:         Urine temperature: confirmed (10/13/2020),     All urine drug screen levels confirmed negative: yes (10/13/2020),     Date and time of last pill: hydrocodone - \"a couple of weeks\"   /mnp (10/13/2020),     Performed by: tls (10/13/2020),     Collection Time: 1300 (10/13/2020),             Assessment:         Z01.818   Encounter for other preprocedural examination       Z96.659   Presence of unspecified artificial knee joint       K21.9   Gastro-esophageal reflux disease without esophagitis       E55.9   Vitamin D deficiency, unspecified       E78.00   Pure hypercholesterolemia, unspecified       E03.8   Other specified hypothyroidism       I48.91   Unspecified atrial fibrillation       M54.5   Low back pain       Z68.31   Body mass index (BMI) 31.0-31.9, adult       I10   Essential (primary) hypertension       F41.9   Anxiety disorder, unspecified       Z79.899   Other long term (current) drug therapy           ORDERS:         Lab Orders:       [id number]  URIC - [facility] Uric Acid, Serum  (Send-Out)            16475  Drug test prsmv read direct optical obs pr date  (In-House)            58478  THYII - [facility] Thyroid panel with TSH (23965, 32801)  (Send-Out)            01287  VITD - [facility] Vitamin D, 25 Hydroxy  (Send-Out)            06107  LPDP - [facility] Lipid Panel  (Send-Out)            15996  Drug test prsmv read direct optical obs pr date  " (In-House)                      Plan:         Encounter for other preprocedural examinationDoris had L knee replacement with Dr Justin  Sept 2018 and found out she has an allergy to the cement and she has been waiting to have this replaced with a cementless joint and she is sched to have this done with Dr Marco Antonio Biswas, ortho in [facility] on Oct 27ths and she needs her pre op today for surgical clearance.off calcium, vitamin D, melatonin, avoiding NSAIDs including her diclofenac gel.flu shot is UTD, she is CLEARED FOR SURGERY, SHE NEEDS CARDIOLOGY CLEARANCE, TOO.        Presence of unspecified artificial knee jointsee above        Gastro-esophageal reflux disease without esophagitisstable off all meds        Vitamin D deficiency, unspecified    LABORATORY:  Labs ordered to be performed today include Vitamin D.            Orders:       [id number]  VITD - [facility] Vitamin D, 25 Hydroxy  (Send-Out)              Pure hypercholesterolemia, unspecifieddiet controlled    LABORATORY:  Labs ordered to be performed today include lipid panel.            Orders:       [id number]  LPDP - [facility] Lipid Panel  (Send-Out)              Other specified hypothyroidism    LABORATORY:  Labs ordered to be performed today include Thyroid Panel.            Orders:       16876  THYII - [facility] Thyroid panel with TSH (42865, 94582)  (Send-Out)              Unspecified atrial fibrillationpending cardiology clearance, too, on eliquis, she is to stop eliquis 2 days prior        Low back painstable        Body mass index (BMI) 31.0-31.9, adultcounseled on weight loss        Essential (primary) hypertensionstable on cartia and HCTZ        Anxiety disorder, unspecifiedongoing, her  has renal failure and has been in the hospital, but she is coping well even with all her stress, sleeping ok, denies suicidal ideation        Other long term (current) drug therapy        RECOMMENDATIONS given include: divya reviewed, drug screen performed and appropriate, consent is  reviewed and signed and on the chart, she is aware of risk of addiction on this medication and understands that she will need to follow up for a review every 3 months and her medications will be adjusted or decreased as deemed appropriate at each visit.  No personal history of drug or alcohol abuse.  No concerns about diversion or abuse.  She denies side effects related to the medication.  She is  aware that she may be called in for pill counts..            Orders:       67362  Drug test prsmv read direct optical obs pr date  (In-House)                  Other Orders      60988  URIC - [facility] Uric Acid, Serum  (Send-Out)            70887  Drug test prsmv read direct optical obs pr date  (In-House)              Charge Capture:         Primary Diagnosis:     Z01.818  Encounter for other preprocedural examination           Orders:      [id number]  Office/outpatient visit; established patient, level 4  (In-House)              Z96.659  Presence of unspecified artificial knee joint     K21.9  Gastro-esophageal reflux disease without esophagitis     E55.9  Vitamin D deficiency, unspecified     E78.00  Pure hypercholesterolemia, unspecified     E03.8  Other specified hypothyroidism     I48.91  Unspecified atrial fibrillation     M54.5  Low back pain     Z68.31  Body mass index (BMI) 31.0-31.9, adult     I10  Essential (primary) hypertension     F41.9  Anxiety disorder, unspecified     Z79.899  Other long term (current) drug therapy           Orders:      [id number]  Drug test prsmv read direct optical obs pr date  (In-House)                  Other Orders:       [id number]  Drug test prsmv read direct optical obs pr date  (In-House)              ADDENDUMS:      ____________________________________    Addendum: 10/15/2020 10:09 AM - One, Team         Visit Note Faxed to:        User Entered Recipient; Number [phone]

## 2021-05-18 NOTE — PROGRESS NOTES
Yulissa Spaulding  1955     Office/Outpatient Visit    Visit Date: Tue, Mar 31, 2020 01:40 pm    Provider: Rayne Gee MD (Assistant: Lian Swan MA)    Location: [facility]        Electronically signed by Rayne Gee MD on  04/01/2020 01:21:01 PM                             Subjective:        CC: pt states she isnt taking ostaflex for bones, calcium, hydrocodone, or lorazepam     HPI:           Patient presents with essential (primary) hypertension.  Her current cardiac medication regimen includes a diuretic ( HCTZ ) and a beta-blocker.  Compliance with treatment has been good; she takes her medication as directed.  She is tolerating the medication well without side effects.  Mrs. Spaulding does not check her blood pressure other than at her clinic appointments.            Pure hypercholesterolemia, unspecified details; current treatment includes a low cholesterol/low fat diet and HAD TO QUIT THE STATIN DUE TO SE'S.  Compliance with treatment has been fair; she does not follow a diet and exercise regimen.  She denies experiencing any hypercholesterolemia related symptoms.        chronic knee pain with her being told by ortho she is having an allergy to her metal, and she finally met up with her ortho and her special metal sensitive and cement free one are unavailable as a combined joint-so she is out of luck for now    ROS:     CONSTITUTIONAL:  Negative for chills and fever.      E/N/T:  Negative for nasal congestion, hoarseness and sore throat.      CARDIOVASCULAR:  Negative for chest pain, palpitations, tachycardia, orthopnea, and edema.      RESPIRATORY:  Negative for cough, dyspnea, and hemoptysis.      GASTROINTESTINAL:  Negative for abdominal pain, constipation, diarrhea, melena, nausea and vomiting.      MUSCULOSKELETAL:  Positive for arthralgias (L knee pain).      INTEGUMENTARY/BREAST:  Negative for rash.      NEUROLOGICAL:  Negative for dizziness and headaches.       PSYCHIATRIC:  Negative for anxiety, depression, and sleep disturbances.          Past Medical History / Family History / Social History:         Last Reviewed on 3/31/2020 02:45 PM by Rayne Gee    Past Medical History:             PAST MEDICAL HISTORY         Positive for    Hyperlipidemia and    Hypertension;     atherosclerosis     Positive for    Hypothyroidism;     Positive for    Allergies;     Positive for    benign breast lump;     Positive for    Depression and    Voice Box Dysfunction;         GYNECOLOGICAL HISTORY:     miscarriage 3    1 DNC         CURRENT MEDICAL PROVIDERS:    Cardiologist: Dr. Nolasco    Gastroenterologist: Dr. Eun Gasca    Orthopedist: Dr Braxton Crawford MD ([facility])    Rheumatologist: Dr. Nigel Webster         PREVENTIVE HEALTH MAINTENANCE             BONE DENSITY: was last done 10/16/17 with normal results     COLORECTAL CANCER SCREENING: Up to date (colonoscopy q10y; sigmoidoscopy q5y; Cologuard q3y) was last done 2020, Results are in chart; colonoscopy with the following abnormalities noted-- Polyp(s) and repeat 3 years     EYE EXAM: was last done ____ (enter month/year) >10 YEARS AGO     MAMMOGRAM: Done within last 2 years and results in are chart was last done 2019 with normal results     PAP SMEAR: was last done  with normal results         Surgical History:         Hernia Repair: umbilical;     GASTRIC SLEEVE SURGERY     Cholecystectomy    Hysterectomy: , SUPRACERVICAL , OOPHERECTOMY BILATERAL;     DNC;    BENIGN BREAST MASS REMOVED LEFT;     EGD 2011 normal;    Bladder repair 3/2005;         Family History:         Mother: COPD     Positive for Coronary Artery Disease ( father; mother ), Hyperlipidemia ( father ) and Hypertension ( father ).          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          Alcohol: Frequency: Once a week         Substance Abuse History:     Last Reviewed on 10/19/2018 02:31 PM by Taylor Bunn        Mental Health History:     Last Reviewed on 10/19/2018 02:31 PM by Taylor Bunn        Communicable Diseases (eg STDs):     Last Reviewed on 10/19/2018 02:31 PM by Taylor Bunn        Allergies:     Last Reviewed on 3/31/2020 01:47 PM by Lian Swan    Atorvastatin Calcium:   (Adverse Reaction)    band aides:      methyl methacrylate:      hydroxyethly methacrylate:      ethyl acrylate:      Bone Cement:          Current Medications:     Last Reviewed on 3/31/2020 01:47 PM by Lain Swan    Lorazepam 0.5 mg oral tablet [1 PO BID PRN]    Pristiq 100 mg oral Tablet, Extended Release 24 hr [Take 1 tablet(s) by mouth daily]    levothyroxine 175 mcg oral tablet [Take 1 tablet(s) by mouth daily]    hydroCHLOROthiazide 25 mg oral tablet [1 tab daily]    Singulair 10 mg oral tablet [1 tab daily]    Zyrtec 10 mg oral tablet [1 tab daily]    Vitamin D3 5,000IU Capsules [1 capsule every day]    OSTAFLEX FOR BONES ONCE DAILY     Diclofenac Sodium 1 % Topical Gel [Apply 2 gm(s) to affected area qid prn pain]    calcium chewables     Breo Ellipta 200mcg/25mcg Inhalation Powder [Take 1 inhalation(s) by mouth daily]    Eliquis 5 mg oral tablet [take 1 tab BID]    Cartia  mg oral Capsule, Extended Release 24 hr [Take 1 capsule(s) by mouth daily]    Melatonin 5 mg oral tablet [4 tabs HS]    HYDROcodone-acetaminophen 5-325 mg oral tablet [1 at hs PRN]    busPIRone 15 mg oral tablet [Take 1 tablet(s) by mouth bid]        Objective:        Vitals:         Current: 3/31/2020 2:42:31 PM    Ht:  5 ft, 9.5 in;  Wt: 264 lbs;  BMI: 38.4BP: 135/86 mm Hg (left arm, sitting);  P: 101 bpm (left arm (BP Cuff), sitting);  sCr: 0.78 mg/dL;  GFR: 95.37        Exams:     PHYSICAL EXAM:     GENERAL: vital signs recorded - well developed, well nourished;   no apparent distress;     EYES: extraocular movements intact;     RESPIRATORY: normal respiratory rate and pattern with no distress;     NEUROLOGIC: mental status: oriented to person, place, and time;  GROSSLY INTACT     PSYCHIATRIC: appropriate affect and demeanor; normal thought and perception;         Assessment:         I10   Essential (primary) hypertension       E78.00   Pure hypercholesterolemia, unspecified       E55.9   Vitamin D deficiency, unspecified       M15.0   Primary generalized (osteo)arthritis       M32.10   Systemic lupus erythematosus, organ or system involvement unspecified       K21.9   Gastro-esophageal reflux disease without esophagitis       T78.40XD   Allergy, unspecified, subsequent encounter           ORDERS:         Meds Prescribed:       [Refilled] Cartia  mg oral Capsule, Extended Release 24 hr [Take 1 capsule(s) by mouth daily], #90 (ninety) capsules, Refills: 1 (one)       [Refilled] diclofenac sodium 1 % Topical Gel [Apply 2 gm(s) to affected area qid prn pain], #300 (three hundred) grams, Refills: 1 (one)       [Refilled] cholecalciferol (vitamin D3) 125 mcg (5,000 unit) oral capsule [1 capsule every day], #100 (one hundred) capsules, Refills: 1 (one)       [Refilled] Breo Ellipta 200-25 mcg/dose Inhalation Blister, With Inhalation Device [Take 1 inhalation(s) by mouth daily], #3 (three) each, Refills: 1 (one)       [Refilled] levothyroxine 175 mcg oral tablet [Take 1 tablet(s) by mouth daily], #90 (ninety) tablets, Refills: 1 (one)       [Refilled] Pristiq 100 mg oral Tablet, Extended Release 24 hr [Take 1 tablet(s) by mouth daily], #90 (ninety) tablets, Refills: 1 (one)       [Refilled] busPIRone 15 mg oral tablet [Take 1 tablet(s) by mouth bid], #180 (one hundred and eighty) tablets, Refills: 1 (one)       [Refilled] hydroCHLOROthiazide 25 mg oral tablet [1 tab daily], #90 (ninety) tablets, Refills: 1 (one)       [Refilled] Singulair 10 mg oral tablet [1 tab daily], #90  (ninety) tablets, Refills: 1 (one)         Lab Orders:       [id number]  HTNLP - [facility] CMP AND LIPID: [id number], [id number]  (Send-Out)            [id number]  VITD - [facility] Vitamin D, 25 Hydroxy  (Send-Out)                      Plan:         Essential (primary) hypertensionstable on meds, no current issues    Telehealth: Verbal consent obtained for visit to occur via televideo conferencing; Staff, other than provider, present during telephone visit include Ana Spaulding nurse           Prescriptions:       [Refilled] Cartia  mg oral Capsule, Extended Release 24 hr [Take 1 capsule(s) by mouth daily], #90 (ninety) capsules, Refills: 1 (one)       [Refilled] diclofenac sodium 1 % Topical Gel [Apply 2 gm(s) to affected area qid prn pain], #300 (three hundred) grams, Refills: 1 (one)       [Refilled] cholecalciferol (vitamin D3) 125 mcg (5,000 unit) oral capsule [1 capsule every day], #100 (one hundred) capsules, Refills: 1 (one)       [Refilled] Breo Ellipta 200-25 mcg/dose Inhalation Blister, With Inhalation Device [Take 1 inhalation(s) by mouth daily], #3 (three) each, Refills: 1 (one)       [Refilled] levothyroxine 175 mcg oral tablet [Take 1 tablet(s) by mouth daily], #90 (ninety) tablets, Refills: 1 (one)       [Refilled] Pristiq 100 mg oral Tablet, Extended Release 24 hr [Take 1 tablet(s) by mouth daily], #90 (ninety) tablets, Refills: 1 (one)       [Refilled] busPIRone 15 mg oral tablet [Take 1 tablet(s) by mouth bid], #180 (one hundred and eighty) tablets, Refills: 1 (one)       [Refilled] hydroCHLOROthiazide 25 mg oral tablet [1 tab daily], #90 (ninety) tablets, Refills: 1 (one)       [Refilled] Singulair 10 mg oral tablet [1 tab daily], #90 (ninety) tablets, Refills: 1 (one)         Pure hypercholesterolemia, unspecifiedstable, due for labs    LABORATORY:  Labs ordered to be performed today include HTN/Lipid Panel: CMP, Lipid.            Orders:       [id number]  HTNLP - [facility] CMP AND LIPID: [id number], [id number]  (Send-Out)               Vitamin D deficiency, unspecified    LABORATORY:  Labs ordered to be performed today include Vitamin D.            Orders:       [id number]  VITD - [facility] Vitamin D, 25 Hydroxy  (Send-Out)              Primary generalized (osteo)arthritisblanca is s/p L TKR and has an allergy to her new joint and is pending getting this replaced but her ortho is having an issue getting this special allergy free joint for her, continue compression stockings        Systemic lupus erythematosus, organ or system involvement unspecifiedchronic, stable        Gastro-esophageal reflux disease without esophagitisstable, off PPI's        Allergy, unspecified, subsequent encounterstable on zyrtec and singulair            Charge Capture:         Primary Diagnosis:     I10  Essential (primary) hypertension           Orders:      [id number]  Office/outpatient visit; established patient, level 4  (In-House)              E78.00  Pure hypercholesterolemia, unspecified     E55.9  Vitamin D deficiency, unspecified     M15.0  Primary generalized (osteo)arthritis     M32.10  Systemic lupus erythematosus, organ or system involvement unspecified     K21.9  Gastro-esophageal reflux disease without esophagitis     T78.40XD  Allergy, unspecified, subsequent encounter

## 2021-05-18 NOTE — PROGRESS NOTES
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  Today her pain is moderate to severe, constant and daily, she is sleeping better with her brace that immobilizes her L knee at night.       Her surgeon is Dr Braxton Crawford and he scheduled her surgery Sept 16th but this was cancelled due to inability to get the \"parts\" that are going to have to be special ordered.        In addition she is in today for her anxiety over this whole ordeal and she is overwhelmed, her  anxiety is so much better and she is functions on the pristiq and buspar with lorazepam prn         In regard to the essential hypercholesterolemia, current treatment includes a low cholesterol/low fat diet and HAD TO QUIT THE STATIN DUE TO SE'S.  Compliance with treatment has been fair; she does not follow a diet and exercise regimen.  She denies experiencing any hypercholesterolemia related symptoms.  Most recent lab tests include Vitamin D, 25-Hydroxy:  43.2 (ng/mL) (04/12/2019), WBC count:  7.72 (K/ul) (07/17/2019), Hemoglobin:  14.70 (gm/dl) (07/17/2019), Hematocrit:  44.8 (%) (07/17/2019), Platelets:  279.00 (K/ul) (07/17/2019), Total Cholesterol:  166 (mg/dL) (04/12/2019), HDL:  40 (mg/dL) (04/12/2019), Triglycerides:  146 (mg/dL) (04/12/2019), LDL:  97 (mg/dL) (04/12/2019), TSH:  2.280 (mIU/L) (04/12/2019), Creatinine, Serum:  0.78 (mg/dl) (04/12/2019), Glom Filt Rate, Est:  >60 (ml/min/1.73m2) (04/12/2019), Alkaline Phosphatase, Serum:  119 (U/L) (04/12/2019), ALT (SGPT): "  22 (U/L) (2019), AST (SGOT):  21 (U/L) (2019).          Concerning acquired hypothyroidism, this was first diagnosed >10 years ago.  She is currently taking Levothyroid, 175 mcg daily.  TSH was last checked 6 months ago.  The result was reported as normal.  She denies any related symptoms.  She reports no symptoms suggestive of adverse medication effect.          With regard to the hypertension, her current cardiac medication regimen includes a diuretic ( HCTZ ) and a beta-blocker.  Mrs. Spaulding does not check her blood pressure other than at her clinic appointments.  She is tolerating the medication well without side effects.  Compliance with treatment has been good; she takes her medication as directed.      ROS:     CONSTITUTIONAL:  Negative for chills, fatigue and fever.      E/N/T:  Negative for nasal congestion, hoarseness and sore throat.      CARDIOVASCULAR:  Negative for chest pain, palpitations, tachycardia, orthopnea, and edema.      RESPIRATORY:  Negative for cough, dyspnea, and hemoptysis.      GASTROINTESTINAL:  Negative for abdominal pain, constipation, diarrhea, melena, nausea and vomiting.      MUSCULOSKELETAL:  Positive for arthralgias (L knee pain).      INTEGUMENTARY/BREAST:  Negative for rash.      NEUROLOGICAL:  Negative for dizziness and headaches.      PSYCHIATRIC:  Negative for anxiety, depression, and sleep disturbances.          PMH/FMH/SH:     Last Reviewed on 10/02/2019 12:08 PM by Rayne Gee    Past Medical History:             PAST MEDICAL HISTORY         Positive for    Hyperlipidemia and    Hypertension;     atherosclerosis     Positive for    Hypothyroidism;     Positive for    Allergies;     Positive for    benign breast lump;     Positive for    Depression and    Voice Box Dysfunction;         GYNECOLOGICAL HISTORY:     miscarriage 3    1 DNC         CURRENT MEDICAL PROVIDERS:    Cardiologist: Dr. Nolasco    Gastroenterologist: Dr. Eun Gasca     Orthopedist: Dr Braxton Crawford MD ([facility])    Rheumatologist: Dr. Nigel Webster         PREVENTIVE HEALTH MAINTENANCE             BONE DENSITY: was last done 10/16/17 with normal results     COLORECTAL CANCER SCREENING: Up to date (colonoscopy q10y; sigmoidoscopy q5y; Cologuard q3y) was last done 12/1/17, Results are in chart; colonoscopy with the following abnormalities noted-- Polyp(s) and repeat 3 years     EYE EXAM: was last done ____ (enter month/year) >10 YEARS AGO     MAMMOGRAM: Done within last 2 years and results in are chart was last done 01- with normal results     PAP SMEAR: was last done 2017 with normal results         Surgical History:         Hernia Repair: umbilical;     GASTRIC SLEEVE SURGERY      Cholecystectomy    Hysterectomy: 2005, SUPRACERVICAL , OOPHERECTOMY BILATERAL;     DNC;    BENIGN BREAST MASS REMOVED LEFT;      EGD 11/2011 normal;    Bladder repair 3/2005;         Family History:         Mother: COPD     Positive for Coronary Artery Disease ( father; mother ), Hyperlipidemia ( father ) and Hypertension ( father ).          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          Alcohol: Frequency: Once a week         Substance Abuse History:     Last Reviewed on 10/19/2018 02:31 PM by Taylor Bunn        Mental Health History:     Last Reviewed on 10/19/2018 02:31 PM by Taylor Bunn        Communicable Diseases (eg STDs):     Last Reviewed on 10/19/2018 02:31 PM by Taylor Bunn            Allergies:     Last Reviewed on 9/05/2019 02:20 PM by Annmarie Hunter    Atorvastatin Calcium: (Adverse Reaction)        Current Medications:     Last Reviewed on 9/05/2019 02:30 PM by Annmarie Hunter    Buspirone HCl 15mg Tablet Take 1 tablet(s) by mouth bid     Diclofenac Sodium 1% Topical  Gel Apply 2 gm(s) to affected area qid prn pain     Lorazepam 0.5mg Tablet 1 PO BID PRN     Hydrochlorothiazide (HCTZ) 25mg Tablet 1 tab daily     Levothyroxine Sodium 0.175mg Tablet Take 1 tablet(s) by mouth daily     Singulair  10mg Tablet 1 tab daily     Vitamin D3 5,000IU Capsules 1 capsule every day     Breo Ellipta 200mcg/25mcg Inhalation Powder Take 1 inhalation(s) by mouth daily     Gabapentin     Melatonin 5mg Tablet 4 tabs HS     Cartia XT 180mg Capsules, Extended Release Take 1 capsule(s) by mouth daily     Eliquis 5mg Tablet take 1 tab BID     calcium chewables     OSTAFLEX FOR BONES ONCE DAILY     Zyrtec 10mg Tablet 1 tab daily     Hydrocodone/Acetaminophen 5mg/325mg Tablet 1 at hs PRN     Pristiq 100mg Tablets, Extended Release Take 1 tablet(s) by mouth daily         OBJECTIVE:        Vitals:         Current: 10/2/2019 11:28:55 AM    Ht:  5 ft, 9.5 in;  Wt: 264 lbs;  BMI: 38.4    T: 98.1 F (oral);  BP: 123/74 mm Hg (left arm, sitting);  P: 66 bpm (left arm (BP Cuff), sitting);  sCr: 0.78 mg/dL;  GFR: 96.62        Exams:     PHYSICAL EXAM:     GENERAL: vital signs recorded - well developed, well nourished;  no apparent distress;     EYES: extraocular movements intact; conjunctiva and cornea are normal; PERRLA;     E/N/T: OROPHARYNX:  normal mucosa, dentition, gingiva, and posterior pharynx;     NECK: range of motion is normal; thyroid is non-palpable;     RESPIRATORY: CTA B WITHOUT WHEEZING/RALES OR RHONCHI, NORMAL RESP. EFFORT     CARDIOVASCULAR: normal rate; rhythm is regular;  no systolic murmur; no edema;     GASTROINTESTINAL: nontender, nondistended; no hepatosplenomegaly or masses; no bruits;     MUSCULOSKELETAL: gait: affected by a left leg limp;  L knee-2+ edema;     NEUROLOGICAL:  cranial nerves, motor and sensory function, reflexes, gait and coordination are all intact;     PSYCHIATRIC:  appropriate affect and demeanor; normal speech pattern; grossly normal memory;         Procedures:      Vaccination against other viral diseases, Influenza     1. Influenza high dose 0.5 ml unit dose, ABN signed given IM in the right upper arm; administered by mnp;  lot number [id number]; expires 5/16/2020             ASSESSMENT           V04.81   Z23  Vaccination against other viral diseases, Influenza              DDx:     V43.65   Z96.659  Artificial joint replacement, Knee              DDx:     300.4   F41.3   F32.1  Anxiety with depression              DDx:     278.02   E66.3  Overweight              DDx:     530.81   K21.9  GERD              DDx:     268.9   E55.9  Vitamin D deficiency, unspecified              DDx:     272.0   E78.00  Essential hypercholesterolemia              DDx:     244.8   E03.8  Acquired hypothyroidism              DDx:     427.31   I48.91  Atrial fibrillation              DDx:     401.1   I10  Hypertension              DDx:         ORDERS:         Meds Prescribed:       Refill of: Buspirone HCl 15mg Tablet Take 1 tablet(s) by mouth bid  #180 (One [place] and Eighty) tablet(s) Refills: 1       Refill of: Pristiq (Desvenlafaxine) 100mg Tablets, Extended Release Take 1 tablet(s) by mouth daily  #90 (Ninety) tablet(s) Refills: 1       Refill of: Diclofenac Sodium 1% Topical Gel Apply 2 gm(s) to affected area qid prn pain  #100 (One [place]) gm Refills: 1       Refill of: Hydrochlorothiazide (HCTZ) 25mg Tablet 1 tab daily  #90 (Ninety) tablet(s) Refills: 1       Refill of: Levothyroxine Sodium 0.175mg Tablet Take 1 tablet(s) by mouth daily  #90 (Ninety) tablet(s) Refills: 1       Refill of: Singulair  (Montelukast Sodium) 10mg Tablet 1 tab daily  #90 (Ninety) tablet(s) Refills: 1       Refill of: Vitamin D3 5,000IU Capsules 1 capsule every day  #100 (One [place]) capsule(s) Refills: 1       Refill of: Breo Ellipta (Fluticasone Furoate/Vilanterol) 200mcg/25mcg Inhalation Powder Take 1 inhalation(s) by mouth daily  #1 (One) inhaler(s) Refills: 5         Lab Orders:       31886  VITD - [facility] Vitamin  D, 25 Hydroxy  (Send-Out)         39795  TSH - [facility] TSH  (Send-Out)         64439  HTNLP [facility] CMP AND LIPID: [id number]  (Send-Out)           Procedures Ordered:       [id number]  Fluzone High Dose  (In-House)           Other Orders:         Medicare Flu Administration  (In-House)                   PLAN:          Vaccination against other viral diseases, Influenza           Orders:       [id number]  Fluzone High Dose  (In-House)           Medicare Flu Administration  (In-House)            Artificial joint replacement, Knee pending repeat joint replacement, takes her pain meds sparingly can you call Dr Crawford and tell him I just want to know what the status is on her new knee replacement-pt is confused and frustrated and asked me to find out what is going on.hkm          Anxiety with depression so much better and well controlled with addition of buspar, cont pristiq, and ok to take lorazepam prn and she takes this med sparingly          Overweight counseled on low carb diet, she is unable to exercise          GERD stable          Vitamin D deficiency, unspecified     LABORATORY:  Labs ordered to be performed today include Vitamin D.            Orders:       [id number]  VITD - [facility] Vitamin D, 25 Hydroxy  (Send-Out)            Essential hypercholesterolemia due for labs, stable on meds     LABORATORY:  Labs ordered to be performed today include HTN/Lipid Panel: CMP, Lipid.            Orders:       [id number]  HTNLP - [facility] CMP AND LIPID: [id number], [id number]  (Send-Out)            Acquired hypothyroidism due for labs, stable on meds     LABORATORY:  Labs ordered to be performed today include TSH.            Orders:       [id number]  TSH - [facility] TSH  (Send-Out)            Atrial fibrillation in NSR and doing well          Hypertension due for labs, stable on meds           Prescriptions:       Refill of: Buspirone HCl 15mg Tablet Take 1 tablet(s) by mouth bid  #180 (One [place] and Eighty) tablet(s) Refills: 1       Refill of: Pristiq  (Desvenlafaxine) 100mg Tablets, Extended Release Take 1 tablet(s) by mouth daily  #90 (Ninety) tablet(s) Refills: 1       Refill of: Diclofenac Sodium 1% Topical Gel Apply 2 gm(s) to affected area qid prn pain  #100 (One Creole) gm Refills: 1       Refill of: Hydrochlorothiazide (HCTZ) 25mg Tablet 1 tab daily  #90 (Ninety) tablet(s) Refills: 1       Refill of: Levothyroxine Sodium 0.175mg Tablet Take 1 tablet(s) by mouth daily  #90 (Ninety) tablet(s) Refills: 1       Refill of: Singulair  (Montelukast Sodium) 10mg Tablet 1 tab daily  #90 (Ninety) tablet(s) Refills: 1       Refill of: Vitamin D3 5,000IU Capsules 1 capsule every day  #100 (One Creole) capsule(s) Refills: 1       Refill of: Breo Ellipta (Fluticasone Furoate/Vilanterol) 200mcg/25mcg Inhalation Powder Take 1 inhalation(s) by mouth daily  #1 (One) inhaler(s) Refills: 5             CHARGE CAPTURE           **Please note: ICD descriptions below are intended for billing purposes only and may not represent clinical diagnoses**        Primary Diagnosis:         V04.81 Vaccination against other viral diseases, Influenza            Z23    Encounter for immunization              Orders:          88002   Office/outpatient visit; established patient, level 4  (In-House)             53402   Fluzone High Dose  (In-House)                Medicare Flu Administration  (In-House)           V43.65 Artificial joint replacement, Knee            Z96.659    Presence of unspecified artificial knee joint    300.4 Anxiety with depression            F41.3    Other mixed anxiety disorders           F32.1    Major depressive disorder, single episode, moderate    278.02 Overweight            E66.3    Overweight    530.81 GERD            K21.9    Gastro-esophageal reflux disease without esophagitis    268.9 Vitamin D deficiency, unspecified            E55.9    Vitamin D deficiency, unspecified    272.0 Essential hypercholesterolemia            E78.00    Pure  hypercholesterolemia, unspecified    244.8 Acquired hypothyroidism            E03.8    Other specified hypothyroidism    427.31 Atrial fibrillation            I48.91    Unspecified atrial fibrillation    401.1 Hypertension            I10    Essential (primary) hypertension

## 2021-05-18 NOTE — PROGRESS NOTES
Yulissa Spaulding 1955     Office/Outpatient Visit    Visit Date: Wed, Kentrell 3, 2018 08:28 am    Provider: Rayne Gee MD (Assistant: Rosalinda Leyva MA)    Location: [facility]        Electronically signed by Rayne Gee MD on  01/09/2018 01:32:30 PM                             SUBJECTIVE:        CC: f/u anxiety         HPI:         Yulissa is in today for her today for her 3 month face to face for her chronic anxiety.  She remains stressed with her husbands stage 3 renal failure (h/o kidney transplant), pacemaker placement and Parkinson syndrome and his poor attitude.  She is on pristiq and overall stable, and is down to 1 lorazepam a day and ready to wean off this med.  She is concerned about her wt and we discussed wt loss options-if I start her on contrave she will need to stop the lorazepam and pristique and after a long discussion she is ready to try this med and stop her other meds.  She denies sadness or depression.  She is sleeping ok and has no suicidal ideation.   She just feels like she is full of nerves.         Concerning essential hypercholesterolemia, current treatment includes a low cholesterol/low fat diet and HAD TO QUIT THE STATIN DUE TO SE'S.  Compliance with treatment has been fair; she does not follow a diet and exercise regimen.  She denies experiencing any hypercholesterolemia related symptoms.  Most recent lab tests include Total Cholesterol:  214 (mg/dL) (07/05/2017), HDL:  58 (mg/dL) (07/05/2017), Triglycerides:  120 (mg/dL) (07/05/2017), LDL:  132 (mg/dL) (07/05/2017), TSH:  1.290 (mIU/L) (07/05/2017), Creatinine, Serum:  0.74 (mg/dl) (10/03/2017), Glom Filt Rate, Est:  >60 (ml/min/1.73m2) (10/03/2017), Alkaline Phosphatase, Serum:  71 (U/L) (10/03/2017), ALT (SGPT):  13 (U/L) (10/03/2017), AST (SGOT):  17 (U/L) (10/03/2017).          With regard to the acquired hypothyroidism, this was first diagnosed >10 years ago.  She is currently taking Levothyroid, 125 mcg  daily.  She cannot remember when a TSH was last checked.  The result was reported as normal.  She denies any related symptoms.  She reports no symptoms suggestive of adverse medication effect.      vitamin D def.         Hypertension details; she is not using any nonpharmacologic treatment modalities.  Her current cardiac medication regimen includes a diuretic ( HCTZ ).  Mrs. Spaulding does not check her blood pressure other than at her clinic appointments.  She is tolerating the medication well without side effects.  Compliance with treatment has been good; she takes her medication as directed.      she is overweight and ready to try a diet pill because she has fatigue and she knows her weight is affecting her health     ROS:     CONSTITUTIONAL:  Positive for fatigue.   Negative for chills or fever.      E/N/T:  Negative for ear pain and sore throat.      CARDIOVASCULAR:  Negative for chest pain, orthopnea, paroxysmal nocturnal dyspnea and pedal edema.      RESPIRATORY:  Negative for dyspnea.      GASTROINTESTINAL:  Negative for abdominal pain, constipation, diarrhea, nausea and vomiting.      PSYCHIATRIC:  Negative for anxiety, depression, and sleep disturbances.          PMH/FMH/SH:     Last Reviewed on 2018 09:15 AM by Rayne Gee    Past Medical History:             PAST MEDICAL HISTORY         Positive for    Hyperlipidemia and    Hypertension;     atherosclerosis    allergies     Positive for    Hypothyroidism;     Positive for    benign breast lump;         GYNECOLOGICAL HISTORY:     miscarriage 3    1 DNC     DEPRESSION    VOICE BOX DYSFUNCTION HTN    ALLERGIC RHINITIS         PREVENTIVE HEALTH MAINTENANCE             BONE DENSITY: was last done 2015 with normal results     MAMMOGRAM: Done within last 2 years and results in are chart was last done 2016 with the following abnormalaties noted-- normal after diagonistic images         Surgical History:         Hernia Repair: umbilical;      GASTRIC SLEEVE SURGERY      Cholecystectomy    Hysterectomy: 2005, SUPRACERVICAL , OOPHERECTOMY BILATERAL;     DNC;    BENIGN BREAST MASS REMOVED LEFT;      EGD 11/2011 normal;    Bladder repair 3/2005;         Family History:         Mother: COPD     Positive for Coronary Artery Disease ( father; mother ), Hyperlipidemia ( father ) and Hypertension ( father ).          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          Alcohol: Frequency: Once a week         Substance Abuse History:     Last Reviewed on 10/03/2017 01:52 PM by Misael Hirsch        Mental Health History:     Last Reviewed on 10/03/2017 01:52 PM by Misael Hirsch        Communicable Diseases (eg STDs):     Last Reviewed on 10/03/2017 01:52 PM by Misael Hirsch            Allergies:     Last Reviewed on 11/02/2017 02:59 PM by Rosalinda Leyva      No Known Drug Allergies.     Atorvastatin Calcium: (Adverse Reaction)        Current Medications:     Last Reviewed on 11/02/2017 02:59 PM by Rosalinda Leyva    Hydrochlorothiazide (HCTZ) 25mg Tablet 1 tab daily     Levothyroxine Sodium 125mcg Capsules Take 1 capsule(s) by mouth daily before breakfast.     Lorazepam 0.5mg Tablet 1 PO BID PRN     Pristiq 100mg Tablets, Extended Release Take 1 tablet(s) by mouth daily     Singulair  10mg Tablet 1 tab daily     Fluticasone Propionate 50mcg/1actuation Nasal Spray 1 or 2 sprays in each nostril qday prn     Vitamin D3 5,000IU Capsules 1 capsule every day     Zyrtec 10mg Tablet 1 tab daily     Aspirin (ASA) 81mg Tablets, Enteric Coated 1 tab daily     citracal     OSTAFLEX FOR BONES ONCE DAILY         OBJECTIVE:        Vitals:         Current: 1/3/2018 8:32:10 AM    Ht:  5 ft, 9.5 in;  Wt: 271.7 lbs;  BMI: 39.5    T: 97.6 F (oral);  BP: 141/92 mm Hg  (left arm, sitting);  P: 79 bpm (left arm (BP Cuff), sitting);  sCr: 0.74 mg/dL;  GFR: 104.43        Exams:     PHYSICAL EXAM:     GENERAL: vital signs recorded - well developed, well nourished;  no apparent distress;     EYES: PERRL, EOMI     E/N/T: NOSE: nasal mucosa is dry;  OROPHARYNX:  normal mucosa, dentition, gingiva, and posterior pharynx;     NECK: range of motion is normal; thyroid is non-palpable;     RESPIRATORY: normal respiratory rate and pattern with no distress; normal breath sounds with no rales, rhonchi, wheezes or rubs;     CARDIOVASCULAR: normal rate; rhythm is regular;  no systolic murmur; trace pedal and 2+ pedal edema;     GASTROINTESTINAL: nontender, nondistended; no hepatosplenomegaly or masses; no bruits;     MUSCULOSKELETAL: Right knee tenderness with mild joint swelling and warmth, FROM to right knee;         ASSESSMENT           300.02   F41.9  Anxiety              DDx:     272.0   E78.00  Essential hypercholesterolemia              DDx:     244.8   E03.8  Acquired hypothyroidism              DDx:     268.9   E55.9  Vitamin D deficiency, unspecified              DDx:     401.1   I10  Hypertension              DDx:     278.02   E66.3  Overweight              DDx:         ORDERS:         Meds Prescribed:       Refill of: Pristiq (Desvenlafaxine) 25mg Tablets, Extended Release 2 pills daily for 1 week, then 1 pill daily for one week, then stop  #21 (Twenty One) tablet(s) Refills: 0       Contrave (Naltrexone HCl/Bupropion HCl) 8mg/90mg Tablets, Extended Release 1 tab qam for 1 week then 1 tab BID for 1 week, then 2tab qam and 1 tab hs for 1week, then 2 bid  #120 (One [place] and Twenty) tablet(s) Refills: 0       Hydroxyzine HCl 25mg Tablet 1 q 6 hours PRN for anxiety  #60 (Sixty) tablet(s) Refills: 0         Lab Orders:       [id number]  PHYSF - [facility] PHYSICAL: CMP, CBC, TSH, LIPID: [id number], [id number]  (Send-Out)         [id number]  VITD - [facility] Vitamin D, 25 Hydroxy  (Send-Out)                    PLAN:          Anxiety overall stable, but we care changing meds due to her desire to lose wt TRY HYDROXYZINE INSTEAD OF LORAZEPAM-she will take 1/2 lorazepam for a week then stop.           Prescriptions:       Refill of: Pristiq (Desvenlafaxine) 25mg Tablets, Extended Release 2 pills daily for 1 week, then 1 pill daily for one week, then stop  #21 (Twenty One) tablet(s) Refills: 0       Hydroxyzine HCl 25mg Tablet 1 q 6 hours PRN for anxiety  #60 (Sixty) tablet(s) Refills: 0          Essential hypercholesterolemia off statins, not following a diet very closely     LABORATORY:  Labs ordered to be performed today include PHYSICAL PANEL; CMP, CBC, TSH, LIPID.            Orders:       [id number]  PHYSF [facility] PHYSICAL: CMP, CBC, TSH, LIPID: [id number], [id number]  (Send-Out)            Acquired hypothyroidism she is on thyroid meds and doing well, due for labs          Vitamin D deficiency, unspecified on vitamin D supplementation     LABORATORY:  Labs ordered to be performed today include Vitamin D.            Orders:       [id number]  VITD - [facility] Vitamin D, 25 Hydroxy  (Send-Out)            Hypertension stable          Overweight will try contrave and taper her off lorazepam and pristique and start contrave, she is counseled on risk/benefits of this med and on diet and exercise, she is ready to start a low carb diet and start walking SHE DEFERS DIETICIAN REFERRAL AT THIS TIME           Prescriptions:       Contrave (Naltrexone HCl/Bupropion HCl) 8mg/90mg Tablets, Extended Release 1 tab qam for 1 week then 1 tab BID for 1 week, then 2tab qam and 1 tab hs for 1week, then 2 bid  #120 (One [place] and Twenty) tablet(s) Refills: 0             CHARGE CAPTURE           **Please note: ICD descriptions below are intended for billing purposes only and may not represent clinical diagnoses**        Primary Diagnosis:         300.02 Anxiety            F41.9    Anxiety disorder, unspecified              Orders:          [id number]    Office/outpatient visit; established patient, level 4  (In-House)           272.0 Essential hypercholesterolemia            E78.00    Pure hypercholesterolemia, unspecified    244.8 Acquired hypothyroidism            E03.8    Other specified hypothyroidism    268.9 Vitamin D deficiency, unspecified            E55.9    Vitamin D deficiency, unspecified    401.1 Hypertension            I10    Essential (primary) hypertension    278.02 Overweight            E66.3    Overweight

## 2021-05-18 NOTE — PROGRESS NOTES
Yulissa Spaulding 1955     Office/Outpatient Visit    Visit Date: Fri, Oct 19, 2018 02:31 pm    Provider: Ezio Wilkins,   (Assistant: Taylor Bunn MA)    Location: [facility]        Electronically signed by Ezio Wilkins,   on  10/19/2018 06:58:16 PM                             SUBJECTIVE:        CC: Patient took old prescriptions to try to help, Cyclobenzaprine and Prednisone this morning ./mlb     Mrs. Spaulding is a 62 year old White female.  She is here today following a transition of care from the emergency department ( [facility] on 10-17-18 for knee/leg pain. She was discharged later that day. ).  Patient complains of pain in the left knee and leg.;         HPI:     Left knee pain started somewhat abruptly last Thursday (1 week ago). Noticed when she woke up that day. Pain is behind the knee and shot down the side and down the calf as well as up her hamstring. She also noticed swelling of left lower leg. Went to a walk in clinic and x-ray was normal. She went to [facility] ER 2 days ago and d-dimer was negative. She also reports her daughter in an [employer] tech and had access to an US machine. Her daughter US her leg herself and did not see an effusion per patient.     - Pt insists on sharing excessive details about the last 8 days of her leg pain, including the several doctor visits she has taken her  to and her conversations with her daughter. Pt is somewhat pushy about asking for pain medications.     ROS:     CONSTITUTIONAL:  Negative for fatigue and fever.      EYES:  Negative for blurred vision.      E/N/T:  Negative for diminished hearing and nasal congestion.      CARDIOVASCULAR:  Negative for chest pain and palpitations.      RESPIRATORY:  Negative for recent cough and dyspnea.      GASTROINTESTINAL:  Negative for abdominal pain, constipation, diarrhea, nausea and vomiting.      GENITOURINARY:  Negative for dysuria and urinary incontinence.      MUSCULOSKELETAL:   Positive for arthralgias.   Negative for myalgias.      INTEGUMENTARY/BREAST:  Negative for atypical mole(s) and rash.      NEUROLOGICAL:  Negative for paresthesias and weakness.      PSYCHIATRIC:  Negative for anxiety, depression and sleep disturbance.          PMH/FMH/SH:     Last Reviewed on 10/19/2018 02:31 PM by Taylor Bunn    Past Medical History:             PAST MEDICAL HISTORY         Positive for    Hyperlipidemia and    Hypertension;     atherosclerosis    allergies     Positive for    Hypothyroidism;     Positive for    benign breast lump;         GYNECOLOGICAL HISTORY:     miscarriage 3    1 DNC     DEPRESSION    VOICE BOX DYSFUNCTION HTN    ALLERGIC RHINITIS         PREVENTIVE HEALTH MAINTENANCE             BONE DENSITY: was last done 2015 with normal results     MAMMOGRAM: Done within last 2 years and results in are chart was last done 2016 with the following abnormalaties noted-- normal after diagonistic images         Surgical History:         Hernia Repair: umbilical;     GASTRIC SLEEVE SURGERY      Cholecystectomy    Hysterectomy: , SUPRACERVICAL , OOPHERECTOMY BILATERAL;     DNC;    BENIGN BREAST MASS REMOVED LEFT;      EGD 2011 normal;    Bladder repair 3/2005;         Family History:         Mother: COPD     Positive for Coronary Artery Disease ( father; mother ), Hyperlipidemia ( father ) and Hypertension ( father ).          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          Alcohol: Frequency: Once a week         Substance Abuse History:     Last Reviewed on 10/19/2018 02:31 PM by Taylor Bunn        Mental Health History:     Last Reviewed on 10/19/2018 02:31 PM by Taylor Bunn        Communicable Diseases (eg STDs):     Last Reviewed on 10/19/2018 02:31 PM by Sepideh  Taylor PASTRANA            Current Problems:     Last Reviewed on 10/19/2018 02:31 PM by Taylor Bunn    Use of high risk medications     Acute lupus     Diffuse arthralgia     Chronic low back pain     Essential hypercholesterolemia     Weight loss program, follow-up     Overweight     Varicose veins of lower extremities, with inflammation     Vitamin D deficiency, unspecified     Allergies     Vitamin D deficiency, NOS     Low back pain     Atherosclerosis, NOS     Anxiety     BMI >30%     Morbid obesity     Hypertension     Moderate depression     Acquired hypothyroidism         Immunizations:     zzFluzone pf-quadrivalent 3 and up 10/3/2017     Fluvirin (4 + years dose) 10/15/2014         Allergies:     Last Reviewed on 10/19/2018 02:31 PM by Taylor Bunn      No Known Drug Allergies.     Atorvastatin Calcium: (Adverse Reaction)    band aides:        Current Medications:     Last Reviewed on 10/19/2018 02:31 PM by Taylor Bunn    Breo Ellipta 200mcg/25mcg Inhalation Powder Take 1 inhalation(s) by mouth daily     Fluticasone Propionate 50mcg/1actuation Nasal Spray 1 or 2 sprays in each nostril qday prn     Hydrochlorothiazide (HCTZ) 25mg Tablet 1 tab daily     Levothyroxine Sodium 0.175mg Tablet Take 1 tablet(s) by mouth daily     Lorazepam 0.5mg Tablet 1 PO BID PRN     Singulair  10mg Tablet 1 tab daily     Vitamin D3 5,000IU Capsules 1 capsule every day     Zyrtec 10mg Tablet 1 tab daily     Aspirin (ASA) 81mg Tablets, Enteric Coated 1 tab daily     Pristiq 100mg Tablets, Extended Release Take 1 tablet(s) by mouth daily     calcium chewables     OSTAFLEX FOR BONES ONCE DAILY         OBJECTIVE:        Vitals:         Current: 10/19/2018 2:35:16 PM    Ht:  5 ft, 9.5 in;  Wt: 270.3 lbs;  BMI: 39.3    T: 98.3 F (oral);  BP: 138/116 mm Hg (left arm, sitting);  P: 58 bpm (left arm (BP Cuff), sitting);  sCr: 0.71 mg/dL;  GFR: 108.60        Exams:     PHYSICAL EXAM:     GENERAL: vital signs recorded - well  developed, well nourished;  well groomed;  no apparent distress;     EYES: extraocular movements intact; conjunctiva and cornea are normal; PERRLA;     E/N/T: OROPHARYNX:  normal mucosa, dentition, gingiva, and posterior pharynx;     NECK: range of motion is normal; thyroid exam reveals not enlarged;     RESPIRATORY: normal respiratory rate and pattern with no distress; normal breath sounds with no rales, rhonchi, wheezes or rubs;     CARDIOVASCULAR: normal rate; rhythm is regular;  no systolic murmur; no edema;     GASTROINTESTINAL: nontender; normal bowel sounds;     MUSCULOSKELETAL: gait: affected by a right leg limp;  normal overall tone TTP of left knee, especially lateral aspect.;     NEUROLOGIC: mental status: alert and oriented x 3;     PSYCHIATRIC:  appropriate affect and demeanor; normal speech pattern; grossly normal memory;         ASSESSMENT           719.46   M25.562  Knee pain              DDx:         ORDERS:         Meds Prescribed:       Norco (Hydrocodone/Acetaminophen) 5mg/325mg Tablet Take 1 tablet(s) by mouth q8h prn  #8 (Eight) tablet(s) Refills: 0       Ketorolac Tromethamine (Ketorolac Tromethamine)  10mg Tablet 1 tab q 8 hrs prn pain  #12 (Twelve) tablet(s) Refills: 0         Radiology/Test Orders:       [id number]  Venous doppler left extremity  (Send-Out)                   PLAN:          Knee pain Pt adamantly expressed the excessive nature of her left knee pain. w/u over the last week has been reassuring, including a normal x-ray at walk in clinic and normal d-dimer at ER. Given these results pain is likely 2/2 ruptured baker's cyst and she may have aggravated this in helping her . Will order doppler US to again r/o DVT and pt prescribed toradol for pain. Pt expresses displeasure and states this will not alleviate her pain and she would like to avoid the ER this weekend. Pt was counseled that toradol and rest would likely resolve her pain and she was given 8 tabs of norco to help her  rest this weekend.         RADIOLOGY:  I have ordered Venous doppler left extremity to be done today.            Prescriptions:       Norco (Hydrocodone/Acetaminophen) 5mg/325mg Tablet Take 1 tablet(s) by mouth q8h prn  #8 (Eight) tablet(s) Refills: 0       Ketorolac Tromethamine (Ketorolac Tromethamine)  10mg Tablet 1 tab q 8 hrs prn pain  #12 (Twelve) tablet(s) Refills: 0           Orders:       [id number]  Venous doppler left extremity  (Send-Out)               CHARGE CAPTURE           **Please note: ICD descriptions below are intended for billing purposes only and may not represent clinical diagnoses**        Primary Diagnosis:         719.46 Knee pain            M25.562    Pain in left knee              Orders:          [id number]   Office/outpatient visit; established patient, level 3  (In-House)

## 2021-05-28 VITALS
WEIGHT: 263 LBS | DIASTOLIC BLOOD PRESSURE: 96 MMHG | SYSTOLIC BLOOD PRESSURE: 130 MMHG | BODY MASS INDEX: 37.65 KG/M2 | HEIGHT: 70 IN

## 2021-05-28 NOTE — PROGRESS NOTES
Patient: EDI ZAFAR     Acct: [id number]     Report: #[id number]  UNIT #: [id number]     : 1955    Encounter Date:2019  PRIMARY CARE: MINNIE KATZ  ***Signed***  --------------------------------------------------------------------------------------------------------------------  DATE: 19      Chief Complaint      HEPATITIS C W/O HEPATIC COMA            Allergies      Coded Allergies:             ATORVASTATIN (Verified  Allergy, Severe, BONE  PAIN , 19)           ADHESIVE (Verified  Allergy, Unknown, MAINLY TO BANDAIDS, 19)            Medications      Last Reconciled on 19 11:08 by CAESAR ALICIA      Desvenlafaxine Succinate (Pristiq) 100 Mg Tab.er.24h      100 MG PO QDAY, TAB.SR         Reported         19       Apixaban (Eliquis) 5 Mg Tablet      5 MG PO BID for 30 Days, #60 TAB         Reported         19       Diltiazem XT (Cartia XT) 180 Mg Cap.sr.24h      180 MG PO QDAY, #30 CAP.ER         Reported         19       Levothyroxine (Levothyroxine) 0.175 Mg Tablet      0.175 MG PO QDAY@07, #30 TAB 0 Refills         Reported         11/15/18       Cholecalciferol (Vitamin D3) 5,000 Unit Capsule      5000 UNITS PO QDAY for 30 Days, #30 CAP         Reported         17       Hydrochlorothiazide (Hydrochlorothiazide*) 25 Mg Tablet      25 MG PO QDAY, #30 TAB 0 Refills         Reported         17       Montelukast Sodium (Montelukast*) 10 Mg Tablet      10 MG PO QDAY, TAB         Reported         17            Vitals      Height 5 ft 10 in / 177.8 cm      Weight 263 lbs  / 119.577414 kg      BSA 2.34 m2      BMI 37.7 kg/m2      Blood Pressure 130/96            Yes: Hx Abdominal Surgery (HERNIA X2), Hx Bladder Surgery (BLADDER REPAIR), Hx     Cholecystectomy, Hx Hernia Surgery, Hx Surgeries (knee)      Heart - Family Hx:  Father      Cancer/Type - Family Hx:  Aunt      Smoking status:  Never smoker      Substance use:  Denies use      Medical  History:  Yes: Hx Arthritis (IN KNEES, SHOULDER), Hx Hypertension (ON     MEDICATIONS), High Cholesterol, Hx Liver Disease, Hx Thyroid Disease, Hx Medical    Other (afib); No: HX CANCER, Hx Diabetes, Hx Seizures, Hx Sleep Apnea      Psychiatric History:  Yes: Hx Depression, Hx Anxiety            PREVENTION      Hx Influenza Vaccination:  Yes      Date Influenza Vaccine Given:  Nov 1, 2017      2 or More Falls Past Year?:  No      Fall Past Year with Injury?:  No      Hx Pneumococcal Vaccination:  Yes      Chart initiated by      hari            General:  No Fatigue, No Weight Loss      HEENT:  No Dysphagia, No Visual Changes      Respiratory:  No Cough, No Dyspnea      Cardiology:  No Chest Pain, No Palpitations      Gastrointestinal:  No Diarrhea, No Constipation      Genitourinary:  No Dysuria, No Frequency      Musculoskeletal:  No Joint Tenderness, No Joint Stiffness      Endocrine:  No Cold Intolerance, No Fatigue      Hematologic:  No Bleeding, No Bruising      Psychologic:  No Anxiety, No Depression      Neurologic:  No Confusion, No Weakness      Skin:  No Rash, No Open Wounds            Ms. Spaulding presents for evaluation of hepatitis C.  She reports being     recently screened per primary care provider.  She denies any previous IV or     intranasal drug use, blood transfusions and unprofessional tattoos.  She denies     any current GI symptoms.  Admits previous gastric sleeve surgery approximately 5    years ago.  She reports that she initially lost 60 pounds however due to illness    of her mother and increased stress she gained this back.  Previously drank 1     alcoholic drink per night however recently stopped this due to heart medication.            HEENT:  Atraumatic; No Scleral Icterus      Lungs:  CTAB, Breathing is unlabored      Abdomen:  Normal BS all 4 Quadrants, Soft      Cardiovascular:  Regular Rate and Rhythm; No Murmur      Constitutional:  Healthy appearing; No Acute Distress       Neurological:  Mental Status WNL, Alert+Ox3      Musculoskeletal:  Normal Bulk Strength, Normal Tone      Skin:  No Rash, No Swelling      Rectal:  Deferred            Lab Results      1/4/2019 CBC: Hemoglobin 14.2, hematocrit 43.7, platelets 281.      CMP: GFR greater than 60, alk phos 66, AST 14, ALT 13, total bilirubin 0.6.      Hepatitis C antibody-1.7            Nae Stiffness Consistent with:  F4 Cirrhosis      CAP Score:  Moderate/Severe Liver Fat            Current Plan      Check HCV quant.  If positive will proceed with further workup and obtaining     approval for treatment.      Chronic hepatitis C         Chronic hepatitis C without hepatic coma         Hepatic coma status: without hepatic coma            Notes      New Medications      * Desvenlafaxine Succinate (Pristiq) 100 MG TAB.ER.24H: 100 MG PO QDAY         Instructions: 1 TAB      Discontinued Medications      * Acetaminophen/Hydrocodone 7.5/325 1 TAB TABLET: 1 TAB PO Q3H PRN PAIN #30      New Diagnostics      * HCV RNA QUANTITATIVE HCPCR, Stat         Dx: Chronic hepatitis C - B18.2      Patient Education Provided:  Yes      Patient Instructions:  Avoid Alcohol, Avoid Illicit Drug Use, Importance of     keeping appointments, Other (Encouraged weight loss)      Disposition:  Other (To be determined based on treatment plan.)                 Disclaimer: Converted document may not contain table formatting or lab diagrams. Please see Register My InfoÂ® System for the authenticated document.

## 2021-06-18 PROBLEM — Z79.899 LONG-TERM USE OF HIGH-RISK MEDICATION: Status: ACTIVE | Noted: 2021-06-18

## 2021-06-22 ENCOUNTER — OFFICE VISIT (OUTPATIENT)
Dept: FAMILY MEDICINE CLINIC | Age: 66
End: 2021-06-22

## 2021-06-22 ENCOUNTER — LAB (OUTPATIENT)
Dept: LAB | Facility: HOSPITAL | Age: 66
End: 2021-06-22

## 2021-06-22 VITALS
DIASTOLIC BLOOD PRESSURE: 67 MMHG | BODY MASS INDEX: 37.34 KG/M2 | SYSTOLIC BLOOD PRESSURE: 110 MMHG | WEIGHT: 260.8 LBS | HEART RATE: 86 BPM | HEIGHT: 70 IN | TEMPERATURE: 98.7 F

## 2021-06-22 DIAGNOSIS — J30.2 SEASONAL ALLERGIC RHINITIS, UNSPECIFIED TRIGGER: ICD-10-CM

## 2021-06-22 DIAGNOSIS — M54.50 LOW BACK PAIN, UNSPECIFIED BACK PAIN LATERALITY, UNSPECIFIED CHRONICITY, UNSPECIFIED WHETHER SCIATICA PRESENT: ICD-10-CM

## 2021-06-22 DIAGNOSIS — I10 ESSENTIAL HYPERTENSION: ICD-10-CM

## 2021-06-22 DIAGNOSIS — Z00.00 ENCOUNTER FOR HEALTH MAINTENANCE EXAMINATION IN ADULT: ICD-10-CM

## 2021-06-22 DIAGNOSIS — I48.0 PAROXYSMAL ATRIAL FIBRILLATION (HCC): ICD-10-CM

## 2021-06-22 DIAGNOSIS — F41.9 ANXIETY: ICD-10-CM

## 2021-06-22 DIAGNOSIS — Z12.11 ENCOUNTER FOR SCREENING FOR MALIGNANT NEOPLASM OF COLON: ICD-10-CM

## 2021-06-22 DIAGNOSIS — Z79.899 LONG-TERM USE OF HIGH-RISK MEDICATION: ICD-10-CM

## 2021-06-22 DIAGNOSIS — E03.8 OTHER SPECIFIED HYPOTHYROIDISM: ICD-10-CM

## 2021-06-22 DIAGNOSIS — F32.0 CURRENT MILD EPISODE OF MAJOR DEPRESSIVE DISORDER WITHOUT PRIOR EPISODE (HCC): ICD-10-CM

## 2021-06-22 DIAGNOSIS — E78.5 HYPERLIPIDEMIA, UNSPECIFIED HYPERLIPIDEMIA TYPE: ICD-10-CM

## 2021-06-22 DIAGNOSIS — E03.8 OTHER SPECIFIED HYPOTHYROIDISM: Primary | ICD-10-CM

## 2021-06-22 PROBLEM — E03.9 HYPOTHYROIDISM: Status: ACTIVE | Noted: 2021-06-22

## 2021-06-22 PROBLEM — F43.21 GRIEF REACTION: Status: ACTIVE | Noted: 2021-06-22

## 2021-06-22 PROBLEM — T78.40XA ALLERGIES: Status: ACTIVE | Noted: 2021-06-22

## 2021-06-22 PROBLEM — M17.9 OSTEOARTHRITIS OF KNEE: Status: ACTIVE | Noted: 2019-01-29

## 2021-06-22 PROBLEM — I48.91 ATRIAL FIBRILLATION: Status: ACTIVE | Noted: 2021-06-22

## 2021-06-22 PROBLEM — F43.20 GRIEF REACTION: Status: ACTIVE | Noted: 2021-06-22

## 2021-06-22 LAB
BASOPHILS # BLD AUTO: 0.05 10*3/MM3 (ref 0–0.2)
BASOPHILS NFR BLD AUTO: 0.5 % (ref 0–1.5)
DEPRECATED RDW RBC AUTO: 44.4 FL (ref 37–54)
EOSINOPHIL # BLD AUTO: 0.4 10*3/MM3 (ref 0–0.4)
EOSINOPHIL NFR BLD AUTO: 4.4 % (ref 0.3–6.2)
ERYTHROCYTE [DISTWIDTH] IN BLOOD BY AUTOMATED COUNT: 13 % (ref 12.3–15.4)
HCT VFR BLD AUTO: 44.3 % (ref 34–46.6)
HGB BLD-MCNC: 14.5 G/DL (ref 12–15.9)
HOLD SPECIMEN: NORMAL
IMM GRANULOCYTES # BLD AUTO: 0.01 10*3/MM3 (ref 0–0.05)
IMM GRANULOCYTES NFR BLD AUTO: 0.1 % (ref 0–0.5)
LYMPHOCYTES # BLD AUTO: 1.53 10*3/MM3 (ref 0.7–3.1)
LYMPHOCYTES NFR BLD AUTO: 16.7 % (ref 19.6–45.3)
MCH RBC QN AUTO: 30.1 PG (ref 26.6–33)
MCHC RBC AUTO-ENTMCNC: 32.7 G/DL (ref 31.5–35.7)
MCV RBC AUTO: 91.9 FL (ref 79–97)
MONOCYTES # BLD AUTO: 0.61 10*3/MM3 (ref 0.1–0.9)
MONOCYTES NFR BLD AUTO: 6.7 % (ref 5–12)
NEUTROPHILS NFR BLD AUTO: 6.54 10*3/MM3 (ref 1.7–7)
NEUTROPHILS NFR BLD AUTO: 71.6 % (ref 42.7–76)
PLATELET # BLD AUTO: 293 10*3/MM3 (ref 140–450)
PMV BLD AUTO: 10.2 FL (ref 6–12)
RBC # BLD AUTO: 4.82 10*6/MM3 (ref 3.77–5.28)
WBC # BLD AUTO: 9.14 10*3/MM3 (ref 3.4–10.8)

## 2021-06-22 PROCEDURE — 80053 COMPREHEN METABOLIC PANEL: CPT

## 2021-06-22 PROCEDURE — 99214 OFFICE O/P EST MOD 30 MIN: CPT | Performed by: FAMILY MEDICINE

## 2021-06-22 PROCEDURE — 84443 ASSAY THYROID STIM HORMONE: CPT

## 2021-06-22 PROCEDURE — G0439 PPPS, SUBSEQ VISIT: HCPCS | Performed by: FAMILY MEDICINE

## 2021-06-22 PROCEDURE — 85025 COMPLETE CBC W/AUTO DIFF WBC: CPT

## 2021-06-22 PROCEDURE — 36415 COLL VENOUS BLD VENIPUNCTURE: CPT

## 2021-06-22 PROCEDURE — 80061 LIPID PANEL: CPT

## 2021-06-22 RX ORDER — LEVOTHYROXINE SODIUM 175 UG/1
TABLET ORAL
COMMUNITY
End: 2021-06-22 | Stop reason: SDUPTHER

## 2021-06-22 RX ORDER — BUSPIRONE HYDROCHLORIDE 15 MG/1
15 TABLET ORAL
COMMUNITY
End: 2021-06-22 | Stop reason: SDUPTHER

## 2021-06-22 RX ORDER — DILTIAZEM HYDROCHLORIDE 180 MG/1
180 CAPSULE, EXTENDED RELEASE ORAL DAILY
COMMUNITY
Start: 2021-03-05 | End: 2021-06-22 | Stop reason: SDUPTHER

## 2021-06-22 RX ORDER — HYDROCHLOROTHIAZIDE 25 MG/1
TABLET ORAL
COMMUNITY
End: 2021-06-22 | Stop reason: SDUPTHER

## 2021-06-22 RX ORDER — LEVOTHYROXINE SODIUM 175 UG/1
175 TABLET ORAL DAILY
Qty: 90 TABLET | Refills: 1 | Status: SHIPPED | OUTPATIENT
Start: 2021-06-22 | End: 2021-11-30 | Stop reason: SDUPTHER

## 2021-06-22 RX ORDER — CONJUGATED ESTROGENS 0.62 MG/G
CREAM VAGINAL
COMMUNITY
End: 2021-06-22

## 2021-06-22 RX ORDER — DESVENLAFAXINE 100 MG/1
TABLET, EXTENDED RELEASE ORAL
COMMUNITY
End: 2021-06-22 | Stop reason: SDUPTHER

## 2021-06-22 RX ORDER — DESVENLAFAXINE 100 MG/1
100 TABLET, EXTENDED RELEASE ORAL DAILY
Qty: 90 TABLET | Refills: 1 | Status: SHIPPED | OUTPATIENT
Start: 2021-06-22 | End: 2022-02-01 | Stop reason: SDUPTHER

## 2021-06-22 RX ORDER — MONTELUKAST SODIUM 10 MG/1
10 TABLET ORAL NIGHTLY
Qty: 90 TABLET | Refills: 1 | Status: SHIPPED | OUTPATIENT
Start: 2021-06-22 | End: 2022-03-01 | Stop reason: SDUPTHER

## 2021-06-22 RX ORDER — CETIRIZINE HYDROCHLORIDE 10 MG/1
TABLET ORAL
COMMUNITY
End: 2022-12-27 | Stop reason: SDUPTHER

## 2021-06-22 RX ORDER — DILTIAZEM HYDROCHLORIDE 180 MG/1
180 CAPSULE, EXTENDED RELEASE ORAL DAILY
Qty: 30 CAPSULE | Refills: 11 | Status: SHIPPED | OUTPATIENT
Start: 2021-06-22 | End: 2022-03-01

## 2021-06-22 RX ORDER — MONTELUKAST SODIUM 10 MG/1
TABLET ORAL
COMMUNITY
End: 2021-06-22 | Stop reason: SDUPTHER

## 2021-06-22 RX ORDER — BUSPIRONE HYDROCHLORIDE 15 MG/1
15 TABLET ORAL 2 TIMES DAILY
Qty: 180 TABLET | Refills: 1 | Status: SHIPPED | OUTPATIENT
Start: 2021-06-22 | End: 2022-02-01 | Stop reason: DRUGHIGH

## 2021-06-22 RX ORDER — HYDROCHLOROTHIAZIDE 25 MG/1
25 TABLET ORAL DAILY
Qty: 90 TABLET | Refills: 1 | Status: SHIPPED | OUTPATIENT
Start: 2021-06-22 | End: 2022-03-01 | Stop reason: SDUPTHER

## 2021-06-22 NOTE — PROGRESS NOTES
The ABCs of the Annual Wellness Visit  Subsequent Medicare Wellness Visit    Chief Complaint   Patient presents with   • Annual Exam       Subjective   History of Present Illness:  Yulissa Spaulding is a 65 y.o. female who presents for a Subsequent Medicare Wellness Visit. Mrs. Spaulding is here for a Medicare wellness visit.  The required HRA questions are integrated within this visit note. Family medical history and individual medical/surgical history were reviewed and updated.  A current height, weight, BMI, blood pressure, and pulse were recorded in the vitals section of the note and have been reviewed. Patient's medications, including supplements, were recorded in the chart and reviewed.  Current providers and suppliers were reviewed and updated.  Self-Assessment of Health: She rates her health as poor. She rates her confidence of being able to control/manage most of her health problems as somewhat confident. Her physical/emotional health has limited her social activites quite a bit.  A review of cognitive impairment was performed, including ability to drive a car, manage finances, and any memory changes, and was found to be negative.  A review of functional ability, including bathing, dressing, walking, and urine/bowel continence as well as level of safety was performed and was found to be negative.  Falls Risk: Has not had any falls or only one fall without injury in the past year.  She denies having trouble hearing the TV/radio when others do not, having to strain to hear or understand conversations and wearing hearing aid(s).  Concerning home safety, see below      Immunization Status: ** >10 years since last Td booster; ** Has not received pneumococcal vaccination; Age>60, no shingles vaccination; Physical Activity: She never excercises.; Type of diet patient normally eats is described as poor--needs improvement.  Tobacco: She has never smoked.  Preventative Health updated today                  In regard  to the other specified hypothyroidism, this was first diagnosed >10 years ago.  She is currently taking Levothyroid, 175 mcg daily.  TSH was last checked 6 months ago.  The result was reported as normal.  She denies any related symptoms.  She reports no symptoms suggestive of adverse medication effect.            Dx with essential (primary) hypertension; her current cardiac medication regimen includes a diuretic ( HCTZ ) and a beta-blocker.  Compliance with treatment has been good; she takes her medication as directed.  She is tolerating the medication well without side effects.  Mrs. Spaulding does not check her blood pressure other than at her clinic appointments.        Yulissa suffers from chronic back pain, can be severe, and she sees the chiropracter Dr Zhou           chronic knee pain with her being told by ortho she is having an allergy to her metal, and she finally met up with her ortho and her special metal sensitive and cement free one are unavailable as a combined joint-so she is out of luck for now    HEALTH RISK ASSESSMENT    Recent Hospitalizations:  [facility] for [facility]    Current Medical Providers:  Patient Care Team:  Rayne Gee MD as PCP - General (Family Medicine)    Smoking Status:  Social History     Tobacco Use   Smoking Status Never Smoker   Smokeless Tobacco Never Used       Alcohol Consumption:  Social History     Substance and Sexual Activity   Alcohol Use None       Depression Screen:   PHQ-2/PHQ-9 Depression Screening 6/22/2021   Little interest or pleasure in doing things 3   Feeling down, depressed, or hopeless 2   Trouble falling or staying asleep, or sleeping too much 0   Feeling tired or having little energy 3   Poor appetite or overeating 3   Feeling bad about yourself - or that you are a failure or have let yourself or your family down 0   Trouble concentrating on things, such as reading the newspaper or watching television 0   Moving or speaking so slowly that other people  could have noticed. Or the opposite - being so fidgety or restless that you have been moving around a lot more than usual 0   Thoughts that you would be better off dead, or of hurting yourself in some way 0   Total Score 11   If you checked off any problems, how difficult have these problems made it for you to do your work, take care of things at home, or get along with other people? Somewhat difficult       Fall Risk Screen:  ZEESHAN Fall Risk Assessment has not been completed.    Health Habits and Functional and Cognitive Screening:  Functional & Cognitive Status 6/22/2021   Do you have difficulty preparing food and eating? No   Do you have difficulty bathing yourself, getting dressed or grooming yourself? No   Do you have difficulty using the toilet? No   Do you have difficulty moving around from place to place? No   Do you have trouble with steps or getting out of a bed or a chair? No   Current Diet Unhealthy Diet   Dental Exam Not up to date   Eye Exam Not up to date   Exercise (times per week) 0 times per week   Current Exercises Include No Regular Exercise   Do you need help using the phone?  No   Are you deaf or do you have serious difficulty hearing?  No   Do you need help with transportation? No   Do you need help shopping? No   Do you need help preparing meals?  No   Do you need help with housework?  No   Do you need help with laundry? No   Do you need help taking your medications? No   Do you need help managing money? No   Do you ever drive or ride in a car without wearing a seat belt? No   Have you felt unusual stress, anger or loneliness in the last month? Yes   Who do you live with? Child   If you need help, do you have trouble finding someone available to you? No   Do you have difficulty concentrating, remembering or making decisions? Yes         Does the patient have evidence of cognitive impairment? No    Asprin use counseling:Does not need ASA (and currently is not on it)    Age-appropriate  Screening Schedule:  Refer to the list below for future screening recommendations based on patient's age, sex and/or medical conditions. Orders for these recommended tests are listed in the plan section. The patient has been provided with a written plan.    Health Maintenance   Topic Date Due   • TDAP/TD VACCINES (1 - Tdap) Never done   • ZOSTER VACCINE (1 of 2) Never done   • INFLUENZA VACCINE  08/01/2021   • LIPID PANEL  10/14/2021   • DXA SCAN  06/30/2022   • MAMMOGRAM  04/07/2023          The following portions of the patient's history were reviewed and updated as appropriate: allergies, current medications, past family history, past medical history, past social history, past surgical history and problem list.    Outpatient Medications Prior to Visit   Medication Sig Dispense Refill   • Acetaminophen (Tylenol) 325 MG capsule Take 1,000 mg by mouth.     • cetirizine (ZyrTEC Allergy) 10 MG tablet Zyrtec 10 mg oral tablet take 1 tablet (10 mg) by oral route once daily   Active     • Melatonin 12 MG tablet Take 1 tablet by mouth Daily.     • vitamin D3 (vitamin d) 125 MCG (5000 UT) capsule capsule Take 1 capsule by mouth Daily.     • apixaban (ELIQUIS) 5 MG tablet tablet Take 5 mg by mouth.     • busPIRone (BUSPAR) 15 MG tablet Take 15 mg by mouth.     • conjugated estrogens (Premarin) 0.625 MG/GM vaginal cream      • desvenlafaxine (Pristiq) 100 MG 24 hr tablet Pristiq 100 mg oral tablet extended release 24 hr take 1 tablet (100 mg) by oral route once daily   Suspended     • Diclofenac Sodium (VOLTAREN) 1 % gel gel Place  on the skin as directed by provider 4 (Four) Times a Day.     • dilTIAZem (TIAZAC) 180 MG 24 hr capsule Take 180 mg by mouth Daily.     • hydroCHLOROthiazide (HYDRODIURIL) 25 MG tablet hydrochlorothiazide 25 mg oral tablet take 1 tablet (25 mg) by oral route once daily   Active     • levothyroxine (SYNTHROID, LEVOTHROID) 175 MCG tablet levothyroxine 175 mcg oral tablet take 1 tablet (175 mcg) by  "oral route once daily   Active     • montelukast (SINGULAIR) 10 MG tablet montelukast 10 mg oral tablet take 1 tablet (10 mg) by oral route once daily in the evening   Active       No facility-administered medications prior to visit.       Patient Active Problem List   Diagnosis   • Long-term use of high-risk medication   • Atrial fibrillation (CMS/HCC)   • Female stress incontinence   • Hypertension   • Hyperlipidemia   • Osteoarthritis of knee   • Seasonal allergic rhinitis   • Hypothyroidism   • Anxiety   • Encounter for health maintenance examination in adult   • Grief reaction   • Allergies   • Paroxysmal atrial fibrillation (CMS/HCC)       Advanced Care Planning:  ACP discussion was declined by the patient. Patient does not have an advance directive, information provided.    Review of Systems    Compared to one year ago, the patient feels her physical health is better.  Compared to one year ago, the patient feels her mental health is worse.    Reviewed chart for potential of high risk medication in the elderly: yes  Reviewed chart for potential of harmful drug interactions in the elderly:yes    Objective         Vitals:    06/22/21 0917   BP: 110/67   BP Location: Left arm   Patient Position: Sitting   Pulse: 86   Temp: 98.7 °F (37.1 °C)   TempSrc: Oral   Weight: 118 kg (260 lb 12.8 oz)   Height: 176.5 cm (69.5\")       Body mass index is 37.96 kg/m².  Discussed the patient's BMI with her. The BMI is above average; BMI management plan is completed.    Physical Exam          Assessment/Plan   Medicare Risks and Personalized Health Plan  CMS Preventative Services Quick Reference  Advance Directive Discussion  Breast Cancer/Mammogram Screening  Cardiovascular risk  Chronic Pain   Colon Cancer Screening  Dementia/Memory   Depression/Dysphoria  Fall Risk  Glaucoma Risk  Hearing Problem  Immunizations Discussed/Encouraged (specific immunizations; Tdap, Influenza, Pneumococcal 23 and Shingrix )  Obesity/Overweight "   Osteoporosis Risk    The above risks/problems have been discussed with the patient.  Pertinent information has been shared with the patient in the After Visit Summary.  Follow up plans and orders are seen below in the Assessment/Plan Section.    Diagnoses and all orders for this visit:    1. Other specified hypothyroidism (Primary)  -     TSH; Future  -     levothyroxine (SYNTHROID, LEVOTHROID) 175 MCG tablet; Take 1 tablet by mouth Daily.  Dispense: 90 tablet; Refill: 1    2. Encounter for health maintenance examination in adult  -     pneumococcal conj. 13-valent (PREVNAR-13) vaccine 0.5 mL  MEDICARE WELLNESS EXAM-SHE IS UTD ON MAMMOGRAM, UTD on  DEXA/COLONOSCOPY (repeat 2022) DEFERS PAP AND PELVIC EXAM, UTD ON VACCINATIONS INCLUDING: FLU/PREVNAR/COVID, DUE FOR SHINGLES VACCINE/PNEUMOVAX/TD VACCINE, MINIMAL FALL RISK, NO MEMORY ISSUES, SHE IS GRIEVING OVER THE DEATH OF HER ,  SHE LIVES ALONE, HER DAUGHTER NIDIA LIVES IN THE BASEMENT, SHE IS ABLE TO DRIVE AND PERFORM ADL'S/FINANCES INDEPENDENTLY, HEARING IS OK, SHE WAS GIVEN A HA ON A LIVING WILL AND A PREV SERVICES HANDOUT AND SAFETY HANDOUT WERE GIVEN TO HER.  CURRENT DOCTOR LIST UPDATED   3. Essential hypertension  -     CBC and Differential; Future  -     Comprehensive metabolic panel; Future  -     dilTIAZem (TIAZAC) 180 MG 24 hr capsule; Take 1 capsule by mouth Daily.  Dispense: 30 capsule; Refill: 11  -     hydroCHLOROthiazide (HYDRODIURIL) 25 MG tablet; Take 1 tablet by mouth Daily.  Dispense: 90 tablet; Refill: 1    4. Current mild episode of major depressive disorder without prior episode (CMS/[facility])    5. Anxiety  -     busPIRone (BUSPAR) 15 MG tablet; Take 1 tablet by mouth 2 (Two) Times a Day.  Dispense: 180 tablet; Refill: 1  -     desvenlafaxine (Pristiq) 100 MG 24 hr tablet; Take 1 tablet by mouth Daily.  Dispense: 90 tablet; Refill: 1    6. Low back pain, unspecified back pain laterality, unspecified chronicity, unspecified whether  sciatica present    7. Hyperlipidemia, unspecified hyperlipidemia type  -     Lipid panel; Future    8. Long-term use of high-risk medication    9. Seasonal allergic rhinitis, unspecified trigger  -     montelukast (SINGULAIR) 10 MG tablet; Take 1 tablet by mouth Every Night.  Dispense: 90 tablet; Refill: 1    10. Paroxysmal atrial fibrillation (CMS/HCC)  -     apixaban (ELIQUIS) 5 MG tablet tablet; Take 1 tablet by mouth Every 12 (Twelve) Hours.  Dispense: 180 tablet; Refill: 1            ADVANCED DIRECTIVES: None   Follow Up:  No follow-ups on file.     An After Visit Summary and PPPS were given to the patient.

## 2021-06-23 LAB
ALBUMIN SERPL-MCNC: 4.5 G/DL (ref 3.5–5.2)
ALBUMIN/GLOB SERPL: 2 G/DL
ALP SERPL-CCNC: 76 U/L (ref 39–117)
ALT SERPL W P-5'-P-CCNC: 12 U/L (ref 1–33)
ANION GAP SERPL CALCULATED.3IONS-SCNC: 8.8 MMOL/L (ref 5–15)
AST SERPL-CCNC: 15 U/L (ref 1–32)
BILIRUB SERPL-MCNC: 0.6 MG/DL (ref 0–1.2)
BUN SERPL-MCNC: 15 MG/DL (ref 8–23)
BUN/CREAT SERPL: 22.1 (ref 7–25)
CALCIUM SPEC-SCNC: 9 MG/DL (ref 8.6–10.5)
CHLORIDE SERPL-SCNC: 104 MMOL/L (ref 98–107)
CHOLEST SERPL-MCNC: 180 MG/DL (ref 0–200)
CO2 SERPL-SCNC: 28.2 MMOL/L (ref 22–29)
CREAT SERPL-MCNC: 0.68 MG/DL (ref 0.57–1)
GFR SERPL CREATININE-BSD FRML MDRD: 86 ML/MIN/1.73
GLOBULIN UR ELPH-MCNC: 2.3 GM/DL
GLUCOSE SERPL-MCNC: 107 MG/DL (ref 65–99)
HDLC SERPL-MCNC: 58 MG/DL (ref 40–60)
LDLC SERPL CALC-MCNC: 105 MG/DL (ref 0–100)
LDLC/HDLC SERPL: 1.78 {RATIO}
POTASSIUM SERPL-SCNC: 4.3 MMOL/L (ref 3.5–5.2)
PROT SERPL-MCNC: 6.8 G/DL (ref 6–8.5)
SODIUM SERPL-SCNC: 141 MMOL/L (ref 136–145)
TRIGL SERPL-MCNC: 94 MG/DL (ref 0–150)
TSH SERPL DL<=0.05 MIU/L-ACNC: 0.71 UIU/ML (ref 0.27–4.2)
VLDLC SERPL-MCNC: 17 MG/DL (ref 5–40)

## 2021-07-01 VITALS
TEMPERATURE: 98 F | HEART RATE: 96 BPM | SYSTOLIC BLOOD PRESSURE: 131 MMHG | HEIGHT: 70 IN | DIASTOLIC BLOOD PRESSURE: 78 MMHG | WEIGHT: 265.2 LBS | BODY MASS INDEX: 37.97 KG/M2

## 2021-07-01 VITALS
DIASTOLIC BLOOD PRESSURE: 92 MMHG | BODY MASS INDEX: 38.9 KG/M2 | HEART RATE: 79 BPM | WEIGHT: 271.7 LBS | SYSTOLIC BLOOD PRESSURE: 141 MMHG | TEMPERATURE: 97.6 F | HEIGHT: 70 IN

## 2021-07-01 VITALS
DIASTOLIC BLOOD PRESSURE: 74 MMHG | HEIGHT: 70 IN | WEIGHT: 264 LBS | SYSTOLIC BLOOD PRESSURE: 123 MMHG | TEMPERATURE: 98.1 F | HEART RATE: 66 BPM | BODY MASS INDEX: 37.8 KG/M2

## 2021-07-01 VITALS
DIASTOLIC BLOOD PRESSURE: 87 MMHG | BODY MASS INDEX: 38.51 KG/M2 | HEART RATE: 85 BPM | SYSTOLIC BLOOD PRESSURE: 132 MMHG | HEIGHT: 70 IN | WEIGHT: 269 LBS | TEMPERATURE: 97.6 F

## 2021-07-01 VITALS
DIASTOLIC BLOOD PRESSURE: 97 MMHG | OXYGEN SATURATION: 99 % | HEIGHT: 70 IN | HEART RATE: 56 BPM | SYSTOLIC BLOOD PRESSURE: 161 MMHG | BODY MASS INDEX: 38.25 KG/M2 | WEIGHT: 267.2 LBS

## 2021-07-01 VITALS
DIASTOLIC BLOOD PRESSURE: 76 MMHG | BODY MASS INDEX: 37.94 KG/M2 | SYSTOLIC BLOOD PRESSURE: 112 MMHG | HEIGHT: 70 IN | TEMPERATURE: 97.4 F | HEART RATE: 92 BPM | WEIGHT: 265 LBS

## 2021-07-01 VITALS
TEMPERATURE: 98.6 F | HEIGHT: 70 IN | SYSTOLIC BLOOD PRESSURE: 125 MMHG | BODY MASS INDEX: 39.3 KG/M2 | WEIGHT: 274.5 LBS | DIASTOLIC BLOOD PRESSURE: 80 MMHG | HEART RATE: 114 BPM

## 2021-07-01 VITALS
HEIGHT: 70 IN | TEMPERATURE: 98.1 F | WEIGHT: 265 LBS | DIASTOLIC BLOOD PRESSURE: 72 MMHG | BODY MASS INDEX: 37.94 KG/M2 | SYSTOLIC BLOOD PRESSURE: 118 MMHG | HEART RATE: 105 BPM

## 2021-07-01 VITALS
SYSTOLIC BLOOD PRESSURE: 138 MMHG | BODY MASS INDEX: 38.7 KG/M2 | DIASTOLIC BLOOD PRESSURE: 116 MMHG | WEIGHT: 270.3 LBS | TEMPERATURE: 98.3 F | HEART RATE: 58 BPM | HEIGHT: 70 IN

## 2021-07-01 VITALS
DIASTOLIC BLOOD PRESSURE: 83 MMHG | BODY MASS INDEX: 38.88 KG/M2 | SYSTOLIC BLOOD PRESSURE: 117 MMHG | HEIGHT: 70 IN | HEART RATE: 106 BPM | TEMPERATURE: 97.7 F | WEIGHT: 271.6 LBS

## 2021-07-01 VITALS
DIASTOLIC BLOOD PRESSURE: 88 MMHG | HEIGHT: 70 IN | SYSTOLIC BLOOD PRESSURE: 138 MMHG | BODY MASS INDEX: 38.51 KG/M2 | HEART RATE: 80 BPM | WEIGHT: 269 LBS | TEMPERATURE: 97.9 F

## 2021-07-02 VITALS
DIASTOLIC BLOOD PRESSURE: 86 MMHG | WEIGHT: 264 LBS | HEIGHT: 70 IN | SYSTOLIC BLOOD PRESSURE: 135 MMHG | HEART RATE: 101 BPM | BODY MASS INDEX: 37.8 KG/M2

## 2021-07-02 VITALS
WEIGHT: 253.2 LBS | DIASTOLIC BLOOD PRESSURE: 84 MMHG | HEIGHT: 70 IN | TEMPERATURE: 96.7 F | SYSTOLIC BLOOD PRESSURE: 131 MMHG | HEART RATE: 94 BPM | BODY MASS INDEX: 36.25 KG/M2

## 2021-07-02 VITALS
DIASTOLIC BLOOD PRESSURE: 70 MMHG | RESPIRATION RATE: 18 BRPM | SYSTOLIC BLOOD PRESSURE: 130 MMHG | TEMPERATURE: 98 F | HEIGHT: 70 IN | BODY MASS INDEX: 36.85 KG/M2

## 2021-07-02 VITALS
BODY MASS INDEX: 36.76 KG/M2 | HEART RATE: 81 BPM | WEIGHT: 256.8 LBS | SYSTOLIC BLOOD PRESSURE: 117 MMHG | TEMPERATURE: 97.7 F | HEIGHT: 70 IN | DIASTOLIC BLOOD PRESSURE: 70 MMHG

## 2021-07-02 VITALS
HEART RATE: 116 BPM | TEMPERATURE: 96.3 F | DIASTOLIC BLOOD PRESSURE: 85 MMHG | SYSTOLIC BLOOD PRESSURE: 148 MMHG | WEIGHT: 258 LBS | HEIGHT: 70 IN | BODY MASS INDEX: 36.94 KG/M2

## 2021-07-22 ENCOUNTER — TELEPHONE (OUTPATIENT)
Dept: FAMILY MEDICINE CLINIC | Age: 66
End: 2021-07-22

## 2021-11-19 RX ORDER — FUROSEMIDE 20 MG/1
20 TABLET ORAL DAILY
Qty: 30 TABLET | Refills: 0 | Status: SHIPPED | OUTPATIENT
Start: 2021-11-19 | End: 2021-11-22 | Stop reason: SDUPTHER

## 2021-11-22 RX ORDER — FUROSEMIDE 20 MG/1
20 TABLET ORAL DAILY
Qty: 10 TABLET | Refills: 0 | Status: SHIPPED | OUTPATIENT
Start: 2021-11-22 | End: 2021-11-30 | Stop reason: SDUPTHER

## 2021-11-30 ENCOUNTER — OFFICE VISIT (OUTPATIENT)
Dept: FAMILY MEDICINE CLINIC | Age: 66
End: 2021-11-30

## 2021-11-30 VITALS
SYSTOLIC BLOOD PRESSURE: 131 MMHG | WEIGHT: 276 LBS | OXYGEN SATURATION: 97 % | HEIGHT: 70 IN | HEART RATE: 68 BPM | TEMPERATURE: 97.7 F | BODY MASS INDEX: 39.51 KG/M2 | DIASTOLIC BLOOD PRESSURE: 67 MMHG

## 2021-11-30 DIAGNOSIS — Z79.899 HIGH RISK MEDICATION USE: ICD-10-CM

## 2021-11-30 DIAGNOSIS — F41.9 ANXIETY: ICD-10-CM

## 2021-11-30 DIAGNOSIS — F43.21 GRIEF REACTION: ICD-10-CM

## 2021-11-30 DIAGNOSIS — E03.8 OTHER SPECIFIED HYPOTHYROIDISM: ICD-10-CM

## 2021-11-30 DIAGNOSIS — F42.9 OBSESSIVE-COMPULSIVE DISORDER, UNSPECIFIED TYPE: ICD-10-CM

## 2021-11-30 DIAGNOSIS — E03.9 ACQUIRED HYPOTHYROIDISM: Primary | ICD-10-CM

## 2021-11-30 DIAGNOSIS — I48.11 LONGSTANDING PERSISTENT ATRIAL FIBRILLATION (HCC): ICD-10-CM

## 2021-11-30 DIAGNOSIS — F32.1 CURRENT MODERATE EPISODE OF MAJOR DEPRESSIVE DISORDER WITHOUT PRIOR EPISODE (HCC): ICD-10-CM

## 2021-11-30 DIAGNOSIS — I10 PRIMARY HYPERTENSION: ICD-10-CM

## 2021-11-30 LAB
AMPHET+METHAMPHET UR QL: NEGATIVE
AMPHETAMINES UR QL: NEGATIVE
BARBITURATES UR QL SCN: NEGATIVE
BENZODIAZ UR QL SCN: NEGATIVE
BUPRENORPHINE SERPL-MCNC: NEGATIVE NG/ML
CANNABINOIDS SERPL QL: NEGATIVE
COCAINE UR QL: NEGATIVE
EXPIRATION DATE: NORMAL
Lab: NORMAL
MDMA UR QL SCN: NEGATIVE
METHADONE UR QL SCN: NEGATIVE
OPIATES UR QL: NEGATIVE
OXYCODONE UR QL SCN: NEGATIVE
PCP UR QL SCN: NEGATIVE

## 2021-11-30 PROCEDURE — 99214 OFFICE O/P EST MOD 30 MIN: CPT | Performed by: FAMILY MEDICINE

## 2021-11-30 PROCEDURE — 80305 DRUG TEST PRSMV DIR OPT OBS: CPT | Performed by: FAMILY MEDICINE

## 2021-11-30 RX ORDER — LEVOTHYROXINE SODIUM 175 UG/1
175 TABLET ORAL DAILY
Qty: 90 TABLET | Refills: 0 | Status: SHIPPED | OUTPATIENT
Start: 2021-11-30 | End: 2021-12-01

## 2021-11-30 RX ORDER — FUROSEMIDE 20 MG/1
20 TABLET ORAL DAILY
Qty: 30 TABLET | Refills: 1 | Status: SHIPPED | OUTPATIENT
Start: 2021-11-30 | End: 2022-02-15 | Stop reason: SDUPTHER

## 2021-11-30 RX ORDER — POTASSIUM CHLORIDE 750 MG/1
10 TABLET, FILM COATED, EXTENDED RELEASE ORAL 2 TIMES DAILY
Qty: 30 TABLET | Refills: 1 | Status: SHIPPED | OUTPATIENT
Start: 2021-11-30 | End: 2021-12-01 | Stop reason: SDUPTHER

## 2021-11-30 RX ORDER — CLONAZEPAM 0.5 MG/1
0.5 TABLET ORAL 2 TIMES DAILY PRN
Qty: 60 TABLET | Refills: 1 | Status: SHIPPED | OUTPATIENT
Start: 2021-11-30 | End: 2022-03-01

## 2021-11-30 RX ORDER — AMIODARONE HYDROCHLORIDE 400 MG/1
400 TABLET ORAL DAILY
COMMUNITY
Start: 2021-11-21

## 2021-11-30 NOTE — PROGRESS NOTES
"Yulissa Spaulding presents to [facility].    Chief Complaint: sadness    Subjective       History of Present Illness:  HPI  She feels angry all the time and she has no patience, she yells at her family and hates that she is doing this.   She lost her  7 months ago and this is weighing heavy on her.  Her daughter lives with her and is going thru a bad depression.  Yulissa is tearful today.  Feels sad and overwhelmed.  She denies suicidal ideation.  She is on buspar for anxiety alone.  She is on pristiq 100 mg daily and buspar 15 mg bid    Yulissa is following up today for swelling of her LE.  It was severe so she went to [facility] and was seen and evaluated.  Her labs showed her BNP was 34, TSH was high 9.78 , Cr 1.10, Na 139, K 3.8, GFR 49, Ca 9.1, LFT normal, WBC was 7.6, hgb 12.9, plt 248, urine clean.  She has underlying afib that is rate controlled with diltiazem and amiodorone and she is on eliquis.  Her weight is up, too.      Her hands are all scratched up due to a kitten    Her back is \"bothering her more\" too.       Review of Systems:  Review of Systems   Constitutional: Positive for fatigue. Negative for chills and fever.   HENT: Negative for ear pain, sinus pressure and sore throat.    Eyes: Negative for blurred vision and double vision.   Respiratory: Negative for cough, shortness of breath and wheezing.    Cardiovascular: Negative for chest pain and palpitations.   Gastrointestinal: Negative for abdominal pain, blood in stool, constipation, diarrhea, nausea and vomiting.   Skin: Negative for rash.   Neurological: Negative for dizziness and headache.   Psychiatric/Behavioral: Positive for depressed mood.        Objective   Medical History:  Past Medical History:   • Anxiety   • Atrial fibrillation (HCC)   • Depression     No past surgical history on file.   History reviewed. No pertinent family history.  Social History     Tobacco Use   • Smoking status: Never Smoker   • " Smokeless tobacco: Never Used   Substance Use Topics   • Alcohol use: Yes     Comment: social        Health Maintenance Due   Topic Date Due   • TDAP/TD VACCINES (1 - Tdap) Never done   • ZOSTER VACCINE (1 of 2) Never done   • COVID-19 Vaccine (3 - Booster for Moderna series) 10/19/2021        Immunization History   Administered Date(s) Administered   • COVID-19 (MODERNA) 1st, 2nd, 3rd Dose Only 03/22/2021, 04/19/2021   • Fluzone High Dose =>65 Years ([employer] ONLY) 10/13/2020, 08/25/2021   • Pneumococcal Conjugate 13-Valent (PCV13) 01/14/2019   • Pneumococcal Polysaccharide (PPSV23) 06/22/2021       Allergies   Allergen Reactions   • Atorvastatin Myalgia     Joint pain  Joint pain     • Methylmethacrylate Swelling     (bone cement)  (bone cement)     • Adhesive Tape Rash     Off-brand bandaids  Off-brand bandaids          Medications:  Current Outpatient Medications on File Prior to Visit   Medication Sig   • amiodarone (PACERONE) 400 MG tablet Take 400 mg by mouth Daily.   • apixaban (ELIQUIS) 5 MG tablet tablet Take 1 tablet by mouth Every 12 (Twelve) Hours.   • busPIRone (BUSPAR) 15 MG tablet Take 1 tablet by mouth 2 (Two) Times a Day.   • cetirizine (ZyrTEC Allergy) 10 MG tablet Zyrtec 10 mg oral tablet take 1 tablet (10 mg) by oral route once daily   Active   • desvenlafaxine (Pristiq) 100 MG 24 hr tablet Take 1 tablet by mouth Daily.   • dilTIAZem (TIAZAC) 180 MG 24 hr capsule Take 1 capsule by mouth Daily.   • furosemide (Lasix) 20 MG tablet Take 1 tablet by mouth Daily.   • hydroCHLOROthiazide (HYDRODIURIL) 25 MG tablet Take 1 tablet by mouth Daily.   • levothyroxine (SYNTHROID, LEVOTHROID) 175 MCG tablet Take 1 tablet by mouth Daily.   • Melatonin 12 MG tablet Take 1 tablet by mouth Daily.   • montelukast (SINGULAIR) 10 MG tablet Take 1 tablet by mouth Every Night.   • vitamin D3 (vitamin d) 125 MCG (5000 UT) capsule capsule Take 1 capsule by mouth Daily.   • Acetaminophen (Tylenol) 325 MG capsule Take  "1,000 mg by mouth.     No current facility-administered medications on file prior to visit.       Vital Signs:   /67 (BP Location: Right arm, Patient Position: Sitting, Cuff Size: Large Adult)   Pulse 68   Temp 97.7 °F (36.5 °C) (Oral)   Ht 176.5 cm (69.5\")   Wt 125 kg (276 lb)   SpO2 97% Comment: room air  BMI 40.17 kg/m²       Physical Exam:  Physical Exam  Vitals and nursing note reviewed.   Constitutional:       General: She is not in acute distress.     Appearance: Normal appearance. She is obese. She is not ill-appearing, toxic-appearing or diaphoretic.   HENT:      Head: Normocephalic and atraumatic.      Right Ear: Tympanic membrane, ear canal and external ear normal.      Left Ear: Tympanic membrane, ear canal and external ear normal.      Nose: No congestion or rhinorrhea.      Mouth/Throat:      Pharynx: Oropharynx is clear. No oropharyngeal exudate or posterior oropharyngeal erythema.   Eyes:      Extraocular Movements: Extraocular movements intact.      Conjunctiva/sclera: Conjunctivae normal.   Cardiovascular:      Rate and Rhythm: Normal rate and regular rhythm.      Heart sounds: Normal heart sounds.   Pulmonary:      Breath sounds: Normal breath sounds. No wheezing, rhonchi or rales.   Abdominal:      General: Abdomen is flat.      Palpations: Abdomen is soft.   Musculoskeletal:      Cervical back: Neck supple. No rigidity.   Lymphadenopathy:      Cervical: No cervical adenopathy.   Skin:     General: Skin is warm and dry.   Neurological:      Mental Status: She is alert and oriented to person, place, and time.   Psychiatric:         Mood and Affect: Mood is depressed. Affect is flat.         Speech: Speech normal.         Behavior: Behavior is agitated.         Thought Content: Thought content does not include homicidal or suicidal ideation.         Cognition and Memory: Cognition normal.         Judgment: Judgment normal.         Result Review      The following data was reviewed by " Rayne Gee MD on 11/30/2021.  Lab Results   Component Value Date    WBC 9.14 06/22/2021    HGB 14.5 06/22/2021    HCT 44.3 06/22/2021    MCV 91.9 06/22/2021     06/22/2021     Lab Results   Component Value Date    GLUCOSE 107 (H) 06/22/2021    BUN 15 06/22/2021    CREATININE 0.68 06/22/2021    EGFRIFNONA 86 06/22/2021    BCR 22.1 06/22/2021    K 4.3 06/22/2021    CO2 28.2 06/22/2021    CALCIUM 9.0 06/22/2021    ALBUMIN 4.50 06/22/2021    LABIL2 1.5 10/02/2019    AST 15 06/22/2021    ALT 12 06/22/2021     Lab Results   Component Value Date    CHOL 180 06/22/2021    CHLPL 176 10/14/2020    TRIG 94 06/22/2021    HDL 58 06/22/2021     (H) 06/22/2021     Lab Results   Component Value Date    TSH 0.711 06/22/2021     Lab Results   Component Value Date    HGBA1C 5.4 08/27/2019     No results found for: PSA                    Assessment and Plan:          Diagnoses and all orders for this visit:    1. Acquired hypothyroidism (Primary)  Comments:  TSH is high, will increase her levothyroxine to 200 mcg daily    2. Primary hypertension  Comments:  stable    3. Longstanding persistent atrial fibrillation (HCC)    4. Anxiety  Comments:  cont buspar at 15 mg bid and I will add clonazepam  Orders:  -     Ambulatory Referral to Psychiatry    5. Grief reaction  Comments:  understandable, she lost her  and feels overwhelmed and sad and stressed  Orders:  -     Ambulatory Referral to Psychiatry    6. Current moderate episode of major depressive disorder without prior episode (HCC)  -     Ambulatory Referral to Psychiatry    7. Obsessive-compulsive disorder, unspecified type  Comments:  she is picking at her skin constantly at our OV, I told her to wrap the area and cut her nails short.   Orders:  -     Ambulatory Referral to Psychiatry    8. High risk medication use          Follow Up   Return if symptoms worsen or fail to improve.

## 2021-12-01 ENCOUNTER — TELEPHONE (OUTPATIENT)
Dept: FAMILY MEDICINE CLINIC | Age: 66
End: 2021-12-01

## 2021-12-01 DIAGNOSIS — E03.9 ACQUIRED HYPOTHYROIDISM: Primary | ICD-10-CM

## 2021-12-01 RX ORDER — POTASSIUM CHLORIDE 750 MG/1
10 TABLET, FILM COATED, EXTENDED RELEASE ORAL 2 TIMES DAILY
Qty: 60 TABLET | Refills: 2 | Status: SHIPPED | OUTPATIENT
Start: 2021-12-01 | End: 2022-03-01 | Stop reason: SDUPTHER

## 2021-12-01 RX ORDER — LEVOTHYROXINE SODIUM 0.2 MG/1
200 TABLET ORAL DAILY
Qty: 90 TABLET | Refills: 1 | Status: SHIPPED | OUTPATIENT
Start: 2021-12-01 | End: 2022-03-01 | Stop reason: SDUPTHER

## 2021-12-01 NOTE — TELEPHONE ENCOUNTER
Caller: Yulissa Spaulding    Relationship: Self    Best call back number: [phone]     What was the call regarding: PT CALLED ABOUT HER LEVOTHYROXINE, SHE STATED HER CHART STATED SHE WOULD BE GETTING 200 MG, WHEN IT WAS FILLED, THEY GAVE .  PLEASE ADVISE.  SHE ALSO STATED THE POTASSIUM CHLORIDE SHE WAS GIVEN IS SUPPOSED TO BE TAKEN 2 A DAY, SHE WAS ONLY GIVEN 30 PILLS, WHICH IS 15 DAY SUPPLY.  PLEASE ADVISE, THANK YOU.    Do you require a callback: YES

## 2021-12-01 NOTE — TELEPHONE ENCOUNTER
Apologized to her for me.  Her levothyroxine 200 did not go through so they must of filled her 175 instead.  She is correct on the potassium and I sent her in a new order for number 60 pills.  Dr. Gee

## 2022-02-01 ENCOUNTER — CLINICAL SUPPORT (OUTPATIENT)
Dept: FAMILY MEDICINE CLINIC | Age: 67
End: 2022-02-01

## 2022-02-01 ENCOUNTER — OFFICE VISIT (OUTPATIENT)
Dept: BEHAVIORAL HEALTH | Facility: CLINIC | Age: 67
End: 2022-02-01

## 2022-02-01 VITALS
BODY MASS INDEX: 39.37 KG/M2 | WEIGHT: 275 LBS | DIASTOLIC BLOOD PRESSURE: 80 MMHG | HEART RATE: 77 BPM | SYSTOLIC BLOOD PRESSURE: 134 MMHG | OXYGEN SATURATION: 96 % | HEIGHT: 70 IN

## 2022-02-01 DIAGNOSIS — F41.8 INSOMNIA SECONDARY TO DEPRESSION WITH ANXIETY: ICD-10-CM

## 2022-02-01 DIAGNOSIS — Z79.899 HIGH RISK MEDICATION USE: ICD-10-CM

## 2022-02-01 DIAGNOSIS — F43.29 PROLONGED GRIEF REACTION: ICD-10-CM

## 2022-02-01 DIAGNOSIS — F41.1 GENERALIZED ANXIETY DISORDER: ICD-10-CM

## 2022-02-01 DIAGNOSIS — F33.0 MILD EPISODE OF RECURRENT MAJOR DEPRESSIVE DISORDER: Primary | ICD-10-CM

## 2022-02-01 DIAGNOSIS — F51.05 INSOMNIA SECONDARY TO DEPRESSION WITH ANXIETY: ICD-10-CM

## 2022-02-01 PROBLEM — Z96.659 HISTORY OF TOTAL KNEE REPLACEMENT: Status: ACTIVE | Noted: 2019-07-20

## 2022-02-01 PROBLEM — T84.9XXA: Status: ACTIVE | Noted: 2020-09-03

## 2022-02-01 PROBLEM — S83.249A TEAR OF MEDIAL MENISCUS OF KNEE: Status: ACTIVE | Noted: 2018-11-06

## 2022-02-01 PROBLEM — M79.89 LIMB SWELLING: Status: ACTIVE | Noted: 2022-02-01

## 2022-02-01 PROBLEM — N32.9 BLADDER DISORDER: Status: ACTIVE | Noted: 2022-02-01

## 2022-02-01 PROBLEM — F39 MOOD DISORDER: Status: ACTIVE | Noted: 2022-02-01

## 2022-02-01 PROBLEM — D72.829 LEUKOCYTOSIS: Status: ACTIVE | Noted: 2020-11-07

## 2022-02-01 PROBLEM — M25.562 LEFT KNEE PAIN: Status: ACTIVE | Noted: 2018-11-06

## 2022-02-01 PROCEDURE — G0480 DRUG TEST DEF 1-7 CLASSES: HCPCS | Performed by: NURSE PRACTITIONER

## 2022-02-01 PROCEDURE — 90792 PSYCH DIAG EVAL W/MED SRVCS: CPT | Performed by: NURSE PRACTITIONER

## 2022-02-01 PROCEDURE — 80305 DRUG TEST PRSMV DIR OPT OBS: CPT | Performed by: FAMILY MEDICINE

## 2022-02-01 RX ORDER — HYDROXYZINE HYDROCHLORIDE 25 MG/1
25 TABLET, FILM COATED ORAL 3 TIMES DAILY PRN
Qty: 60 TABLET | Refills: 0 | Status: SHIPPED | OUTPATIENT
Start: 2022-02-01 | End: 2022-03-01

## 2022-02-01 RX ORDER — BUSPIRONE HYDROCHLORIDE 30 MG/1
30 TABLET ORAL 2 TIMES DAILY
Qty: 60 TABLET | Refills: 2 | Status: SHIPPED | OUTPATIENT
Start: 2022-02-01 | End: 2022-07-27 | Stop reason: SDUPTHER

## 2022-02-01 RX ORDER — DESVENLAFAXINE 100 MG/1
100 TABLET, EXTENDED RELEASE ORAL DAILY
Qty: 90 TABLET | Refills: 1 | Status: SHIPPED | OUTPATIENT
Start: 2022-02-01 | End: 2022-06-23 | Stop reason: SDUPTHER

## 2022-02-01 NOTE — PATIENT INSTRUCTIONS
1.  Please return to clinic at your next scheduled visit.  Contact the clinic ([phone]) at least 24 hours prior in the event you need to cancel.  2.  Do no harm to yourself or others.    3.  Avoid alcohol and drugs.    4.  Take all medications as prescribed.  Please contact the clinic with any concerns. If you are in need of medication refills, please call the clinic at [phone].    5. Should you want to get in touch with your provider, RAVIN Cai, please utilize [employer] or contact the office ([phone]), and staff will be able to page the provider on call directly.  6.  In the event you have personal crisis, contact the following crisis numbers: Suicide Prevention Hotline 1-430.462.9722; [facility] Helpline 0-991-809-[facility]; [facility] Emergency Room [phone]; text HELLO to 698569; or 029.     SPECIFIC RECOMMENDATIONS:     1.      Medications discussed at this encounter:                   - Increase Buspar to 30 mg by mouth twice daily  Continue Pristiq 100 mg by mouth daily  Start Hydroxyzine 25 mg by mouth 3 times daily as needed *DO NOT PLACE IN PILL PLANNER- ONLY TAKE AS NEEDED, WHEN YOU FELL ANXIETY COMING ON**  If Hydroxyzine Ineffective please let me know , you may also break the hydroxyzine in half to equal 12.5 mg if you become to sleepy with the 25 mg dose.     2.      Psychotherapy recommendations: n/a     3.     Return to clinic: 4 weeks    4.   Urine drug screen    Buspirone tablets  What is this medicine?  BUSPIRONE (byshital correa) is used to treat anxiety disorders.  This medicine may be used for other purposes; ask your health care provider or pharmacist if you have questions.  COMMON BRAND NAME(S): BuSpar  What should I tell my health care provider before I take this medicine?  They need to know if you have any of these conditions:  · kidney or liver disease  · an unusual or allergic reaction to buspirone, other medicines, foods, dyes, or  preservatives  · pregnant or trying to get pregnant  · breast-feeding  How should I use this medicine?  Take this medicine by mouth with a glass of water. Follow the directions on the prescription label. You may take this medicine with or without food. To ensure that this medicine always works the same way for you, you should take it either always with or always without food. Take your doses at regular intervals. Do not take your medicine more often than directed. Do not stop taking except on the advice of your doctor or health care professional.  Talk to your pediatrician regarding the use of this medicine in children. Special care may be needed.  Overdosage: If you think you have taken too much of this medicine contact a poison control center or emergency room at once.  NOTE: This medicine is only for you. Do not share this medicine with others.  What if I miss a dose?  If you miss a dose, take it as soon as you can. If it is almost time for your next dose, take only that dose. Do not take double or extra doses.  What may interact with this medicine?  Do not take this medicine with any of the following medications:  · linezolid  · MAOIs like Carbex, Eldepryl, Marplan, Nardil, and Parnate  · methylene blue  · procarbazine  This medicine may also interact with the following medications:  · diazepam  · digoxin  · diltiazem  · erythromycin  · grapefruit juice  · haloperidol  · medicines for mental depression or mood problems  · medicines for seizures like carbamazepine, phenobarbital and phenytoin  · nefazodone  · other medications for anxiety  · rifampin  · ritonavir  · some antifungal medicines like itraconazole, ketoconazole, and voriconazole  · verapamil  · warfarin  This list may not describe all possible interactions. Give your health care provider a list of all the medicines, herbs, non-prescription drugs, or dietary supplements you use. Also tell them if you smoke, drink alcohol, or use illegal drugs. Some  items may interact with your medicine.  What should I watch for while using this medicine?  Visit your doctor or health care professional for regular checks on your progress. It may take 1 to 2 weeks before your anxiety gets better.  You may get drowsy or dizzy. Do not drive, use machinery, or do anything that needs mental alertness until you know how this drug affects you. Do not stand or sit up quickly, especially if you are an older patient. This reduces the risk of dizzy or fainting spells. Alcohol can make you more drowsy and dizzy. Avoid alcoholic drinks.  What side effects may I notice from receiving this medicine?  Side effects that you should report to your doctor or health care professional as soon as possible:  · blurred vision or other vision changes  · chest pain  · confusion  · difficulty breathing  · feelings of hostility or anger  · muscle aches and pains  · numbness or tingling in hands or feet  · ringing in the ears  · skin rash and itching  · vomiting  · weakness  Side effects that usually do not require medical attention (report to your doctor or health care professional if they continue or are bothersome):  · disturbed dreams, nightmares  · headache  · nausea  · restlessness or nervousness  · sore throat and nasal congestion  · stomach upset  This list may not describe all possible side effects. Call your doctor for medical advice about side effects. You may report side effects to FDA at 6-933-FDA-3937.  Where should I keep my medicine?  Keep out of the reach of children.  Store at room temperature below 30 degrees C (86 degrees F). Protect from light. Keep container tightly closed. Throw away any unused medicine after the expiration date.  NOTE: This sheet is a summary. It may not cover all possible information. If you have questions about this medicine, talk to your doctor, pharmacist, or health care provider.  © 2021 [employer] Standard (2011-07-28 18:06:11)  Hydroxyzine capsules or  tablets  What is this medicine?  HYDROXYZINE (robson DROX i zeen) is an antihistamine. This medicine is used to treat allergy symptoms. It is also used to treat anxiety and tension. This medicine can be used with other medicines to induce sleep before surgery.  This medicine may be used for other purposes; ask your health care provider or pharmacist if you have questions.  COMMON BRAND NAME(S): ANX, Atarax, Rezine, Vistaril  What should I tell my health care provider before I take this medicine?  They need to know if you have any of these conditions:  · glaucoma  · heart disease  · history of irregular heartbeat  · kidney disease  · liver disease  · lung or breathing disease, like asthma  · stomach or intestine problems  · thyroid disease  · trouble passing urine  · an unusual or allergic reaction to hydroxyzine, cetirizine, other medicines, foods, dyes or preservatives  · pregnant or trying to get pregnant  · breast-feeding  How should I use this medicine?  Take this medicine by mouth with a full glass of water. Follow the directions on the prescription label. You may take this medicine with food or on an empty stomach. Take your medicine at regular intervals. Do not take your medicine more often than directed.  Talk to your pediatrician regarding the use of this medicine in children. Special care may be needed. While this drug may be prescribed for children as young as 6 years of age for selected conditions, precautions do apply.  Patients over 65 years old may have a stronger reaction and need a smaller dose.  Overdosage: If you think you have taken too much of this medicine contact a poison control center or emergency room at once.  NOTE: This medicine is only for you. Do not share this medicine with others.  What if I miss a dose?  If you miss a dose, take it as soon as you can. If it is almost time for your next dose, take only that dose. Do not take double or extra doses.  What may interact with this  medicine?  Do not take this medicine with any of the following medications:  · cisapride  · dronedarone  · pimozide  · thioridazine  This medicine may also interact with the following medications:  · alcohol  · antihistamines for allergy, cough, and cold  · atropine  · barbiturate medicines for sleep or seizures, like phenobarbital  · certain antibiotics like erythromycin or clarithromycin  · certain medicines for anxiety or sleep  · certain medicines for bladder problems like oxybutynin, tolterodine  · certain medicines for depression or psychotic disturbances  · certain medicines for irregular heart beat  · certain medicines for Parkinson's disease like benztropine, trihexyphenidyl  · certain medicines for seizures like phenobarbital, primidone  · certain medicines for stomach problems like dicyclomine, hyoscyamine  · certain medicines for travel sickness like scopolamine  · ipratropium  · narcotic medicines for pain  · other medicines that prolong the QT interval (an abnormal heart rhythm) like dofetilide  This list may not describe all possible interactions. Give your health care provider a list of all the medicines, herbs, non-prescription drugs, or dietary supplements you use. Also tell them if you smoke, drink alcohol, or use illegal drugs. Some items may interact with your medicine.  What should I watch for while using this medicine?  Tell your doctor or health care professional if your symptoms do not improve.  You may get drowsy or dizzy. Do not drive, use machinery, or do anything that needs mental alertness until you know how this medicine affects you. Do not stand or sit up quickly, especially if you are an older patient. This reduces the risk of dizzy or fainting spells. Alcohol may interfere with the effect of this medicine. Avoid alcoholic drinks.  Your mouth may get dry. Chewing sugarless gum or sucking hard candy, and drinking plenty of water may help. Contact your doctor if the problem does not go  "away or is severe.  This medicine may cause dry eyes and blurred vision. If you wear contact lenses you may feel some discomfort. Lubricating drops may help. See your eye doctor if the problem does not go away or is severe.  If you are receiving skin tests for allergies, tell your doctor you are using this medicine.  What side effects may I notice from receiving this medicine?  Side effects that you should report to your doctor or health care professional as soon as possible:  · allergic reactions like skin rash, itching or hives, swelling of the face, lips, or tongue  · changes in vision  · confusion  · fast, irregular heartbeat  · seizures  · tremor  · trouble passing urine or change in the amount of urine  Side effects that usually do not require medical attention (report to your doctor or health care professional if they continue or are bothersome):  · constipation  · drowsiness  · dry mouth  · headache  · tiredness  This list may not describe all possible side effects. Call your doctor for medical advice about side effects. You may report side effects to FDA at 1-327-FDA-0993.  Where should I keep my medicine?  Keep out of the reach of children.  Store at room temperature between 15 and 30 degrees C (59 and 86 degrees F). Keep container tightly closed. Throw away any unused medicine after the expiration date.  NOTE: This sheet is a summary. It may not cover all possible information. If you have questions about this medicine, talk to your doctor, pharmacist, or health care provider.  © 2021 [employer]vier/Gold Standard (2019-12-09 13:19:55)    [email or web address].NIH.Gov\">   Generalized Anxiety Disorder, Adult  Generalized anxiety disorder (SANDRA) is a mental health condition. Unlike normal worries, anxiety related to SANDRA is not triggered by a specific event. These worries do not fade or get better with time. SANDRA interferes with relationships, work, and school.  SANDRA symptoms can vary from mild to severe. People with severe SANDRA " can have intense waves of anxiety with physical symptoms that are similar to panic attacks.  What are the causes?  The exact cause of SANDRA is not known, but the following are believed to have an impact:  · Differences in natural brain chemicals.  · Genes passed down from parents to children.  · Differences in the way threats are perceived.  · Development during childhood.  · Personality.  What increases the risk?  The following factors may make you more likely to develop this condition:  · Being female.  · Having a family history of anxiety disorders.  · Being very shy.  · Experiencing very stressful life events, such as the death of a loved one.  · Having a very stressful family environment.  What are the signs or symptoms?  People with SANDRA often worry excessively about many things in their lives, such as their health and family. Symptoms may also include:  · Mental and emotional symptoms:  ? Worrying excessively about natural disasters.  ? Fear of being late.  ? Difficulty concentrating.  ? Fears that others are judging your performance.  · Physical symptoms:  ? Fatigue.  ? Headaches, muscle tension, muscle twitches, trembling, or feeling shaky.  ? Feeling like your heart is pounding or beating very fast.  ? Feeling out of breath or like you cannot take a deep breath.  ? Having trouble falling asleep or staying asleep, or experiencing restlessness.  ? Sweating.  ? Nausea, diarrhea, or irritable bowel syndrome (IBS).  · Behavioral symptoms:  ? Experiencing erratic moods or irritability.  ? Avoidance of new situations.  ? Avoidance of people.  ? Extreme difficulty making decisions.  How is this diagnosed?  This condition is diagnosed based on your symptoms and medical history. You will also have a physical exam. Your health care provider may perform tests to rule out other possible causes of your symptoms.  To be diagnosed with SANDRA, a person must have anxiety that:  · Is out of his or her control.  · Affects several  different aspects of his or her life, such as work and relationships.  · Causes distress that makes him or her unable to take part in normal activities.  · Includes at least three symptoms of SANDRA, such as restlessness, fatigue, trouble concentrating, irritability, muscle tension, or sleep problems.  Before your health care provider can confirm a diagnosis of SANDRA, these symptoms must be present more days than they are not, and they must last for 6 months or longer.  How is this treated?  This condition may be treated with:  · Medicine. Antidepressant medicine is usually prescribed for long-term daily control. Anti-anxiety medicines may be added in severe cases, especially when panic attacks occur.  · Talk therapy (psychotherapy). Certain types of talk therapy can be helpful in treating SANDRA by providing support, education, and guidance. Options include:  ? Cognitive behavioral therapy (CBT). People learn coping skills and self-calming techniques to ease their physical symptoms. They learn to identify unrealistic thoughts and behaviors and to replace them with more appropriate thoughts and behaviors.  ? Acceptance and commitment therapy (ACT). This treatment teaches people how to be mindful as a way to cope with unwanted thoughts and feelings.  ? Biofeedback. This process trains you to manage your body's response (physiological response) through breathing techniques and relaxation methods. You will work with a therapist while machines are used to monitor your physical symptoms.  · Stress management techniques. These include yoga, meditation, and exercise.  A mental health specialist can help determine which treatment is best for you. Some people see improvement with one type of therapy. However, other people require a combination of therapies.  Follow these instructions at home:  Lifestyle  · Maintain a consistent routine and schedule.  · Anticipate stressful situations. Create a plan, and allow extra time to work with  your plan.  · Practice stress management or self-calming techniques that you have learned from your therapist or your health care provider.  General instructions  · Take over-the-counter and prescription medicines only as told by your health care provider.  · Understand that you are likely to have setbacks. Accept this and be kind to yourself as you persist to take better care of yourself.  · Recognize and accept your accomplishments, even if you  them as small.  · Keep all follow-up visits as told by your health care provider. This is important.  Contact a health care provider if:  · Your symptoms do not get better.  · Your symptoms get worse.  · You have signs of depression, such as:  ? A persistently sad or irritable mood.  ? Loss of enjoyment in activities that used to bring you jhon.  ? Change in weight or eating.  ? Changes in sleeping habits.  ? Avoiding friends or family members.  ? Loss of energy for normal tasks.  ? Feelings of guilt or worthlessness.  Get help right away if:  · You have serious thoughts about hurting yourself or others.  If you ever feel like you may hurt yourself or others, or have thoughts about taking your own life, get help right away. Go to your nearest emergency department or:  · Call your local emergency services (911 in the [place]  · Call a suicide crisis helpline, such as the National Suicide Prevention Lifeline at 1-806.709.8347. This is open 24 hours a day in the [place]  · Text the Crisis Text Line at 885241 (in the [place]  Summary  · Generalized anxiety disorder (SANDRA) is a mental health condition that involves worry that is not triggered by a specific event.  · People with SANRDA often worry excessively about many things in their lives, such as their health and family.  · SANDRA may cause symptoms such as restlessness, trouble concentrating, sleep problems, frequent sweating, nausea, diarrhea, headaches, and trembling or muscle twitching.  · A mental health specialist can help  determine which treatment is best for you. Some people see improvement with one type of therapy. However, other people require a combination of therapies.  This information is not intended to replace advice given to you by your health care provider. Make sure you discuss any questions you have with your health care provider.  Document Revised: 10/07/2020 Document Reviewed: 10/07/2020  [employer] Patient Education © 2021 [employer]    Managing Anxiety, Adult  After being diagnosed with an anxiety disorder, you may be relieved to know why you have felt or behaved a certain way. You may also feel overwhelmed about the treatment ahead and what it will mean for your life. With care and support, you can manage this condition and recover from it.  How to manage lifestyle changes  Managing stress and anxiety    Stress is your body's reaction to life changes and events, both good and bad. Most stress will last just a few hours, but stress can be ongoing and can lead to more than just stress. Although stress can play a major role in anxiety, it is not the same as anxiety. Stress is usually caused by something external, such as a deadline, test, or competition. Stress normally passes after the triggering event has ended.   Anxiety is caused by something internal, such as imagining a terrible outcome or worrying that something will go wrong that will devastate you. Anxiety often does not go away even after the triggering event is over, and it can become long-term (chronic) worry. It is important to understand the differences between stress and anxiety and to manage your stress effectively so that it does not lead to an anxious response.  Talk with your health care provider or a counselor to learn more about reducing anxiety and stress. He or she may suggest tension reduction techniques, such as:  · Music therapy. This can include creating or listening to music that you enjoy and that inspires you.  · Mindfulness-based  meditation. This involves being aware of your normal breaths while not trying to control your breathing. It can be done while sitting or walking.  · Centering prayer. This involves focusing on a word, phrase, or sacred image that means something to you and brings you peace.  · Deep breathing. To do this, expand your stomach and inhale slowly through your nose. Hold your breath for 3-5 seconds. Then exhale slowly, letting your stomach muscles relax.  · Self-talk. This involves identifying thought patterns that lead to anxiety reactions and changing those patterns.  · Muscle relaxation. This involves tensing muscles and then relaxing them.  Choose a tension reduction technique that suits your lifestyle and personality. These techniques take time and practice. Set aside 5-15 minutes a day to do them. Therapists can offer counseling and training in these techniques. The training to help with anxiety may be covered by some insurance plans. Other things you can do to manage stress and anxiety include:  · Keeping a stress/anxiety diary. This can help you learn what triggers your reaction and then learn ways to manage your response.  · Thinking about how you react to certain situations. You may not be able to control everything, but you can control your response.  · Making time for activities that help you relax and not feeling guilty about spending your time in this way.  · Visual imagery and yoga can help you stay calm and relax.    Medicines  Medicines can help ease symptoms. Medicines for anxiety include:  · Anti-anxiety drugs.  · Antidepressants.  Medicines are often used as a primary treatment for anxiety disorder. Medicines will be prescribed by a health care provider. When used together, medicines, psychotherapy, and tension reduction techniques may be the most effective treatment.  Relationships  Relationships can play a big part in helping you recover. Try to spend more time connecting with trusted friends and  family members. Consider going to couples counseling, taking family education classes, or going to family therapy. Therapy can help you and others better understand your condition.  How to recognize changes in your anxiety  Everyone responds differently to treatment for anxiety. Recovery from anxiety happens when symptoms decrease and stop interfering with your daily activities at home or work. This may mean that you will start to:  · Have better concentration and focus. Worry will interfere less in your daily thinking.  · Sleep better.  · Be less irritable.  · Have more energy.  · Have improved memory.  It is important to recognize when your condition is getting worse. Contact your health care provider if your symptoms interfere with home or work and you feel like your condition is not improving.  Follow these instructions at home:  Activity  · Exercise. Most adults should do the following:  ? Exercise for at least 150 minutes each week. The exercise should increase your heart rate and make you sweat (moderate-intensity exercise).  ? Strengthening exercises at least twice a week.  · Get the right amount and quality of sleep. Most adults need 7-9 hours of sleep each night.  Lifestyle    · Eat a healthy diet that includes plenty of vegetables, fruits, whole grains, low-fat dairy products, and lean protein. Do not eat a lot of foods that are high in solid fats, added sugars, or salt.  · Make choices that simplify your life.  · Do not use any products that contain nicotine or tobacco, such as cigarettes, e-cigarettes, and chewing tobacco. If you need help quitting, ask your health care provider.  · Avoid caffeine, alcohol, and certain over-the-counter cold medicines. These may make you feel worse. Ask your pharmacist which medicines to avoid.    General instructions  · Take over-the-counter and prescription medicines only as told by your health care provider.  · Keep all follow-up visits as told by your health care  provider. This is important.  Where to find support  You can get help and support from these sources:  · Self-help groups.  · Online and community organizations.  · A trusted spiritual leader.  · Couples counseling.  · Family education classes.  · Family therapy.  Where to find more information  You may find that joining a support group helps you deal with your anxiety. The following sources can help you locate counselors or support groups near you:  · Mental Health [place]: www.mentalhealthamerica.net  · Anxiety and Depression Association of [place] (ADAA): www.adaa.org  · National [place] on Mental Illness ([place]): www.tyrese.org  Contact a health care provider if you:  · Have a hard time staying focused or finishing daily tasks.  · Spend many hours a day feeling worried about everyday life.  · Become exhausted by worry.  · Start to have headaches, feel tense, or have nausea.  · Urinate more than normal.  · Have diarrhea.  Get help right away if you have:  · A racing heart and shortness of breath.  · Thoughts of hurting yourself or others.  If you ever feel like you may hurt yourself or others, or have thoughts about taking your own life, get help right away. You can go to your nearest emergency department or call:  · Your local emergency services (911 in the [place]  · A suicide crisis helpline, such as the National Suicide Prevention Lifeline at 1-593.448.2606. This is open 24 hours a day.  Summary  · Taking steps to learn and use tension reduction techniques can help calm you and help prevent triggering an anxiety reaction.  · When used together, medicines, psychotherapy, and tension reduction techniques may be the most effective treatment.  · Family, friends, and partners can play a big part in helping you recover from an anxiety disorder.  This information is not intended to replace advice given to you by your health care provider. Make sure you discuss any questions you have with your health care  "provider.  Document Revised: 05/19/2020 Document Reviewed: 05/19/2020  [employer] Patient Education © 2021 [employer]    http://APA.org/depression-guideline\"> https://[employer]"\"> http://point-of-care.[employer]/skills/\"> http://point-of-care.[employer].com\">   Managing Depression, Adult  Depression is a mental health condition that affects your thoughts, feelings, and actions. Being diagnosed with depression can bring you relief if you did not know why you have felt or behaved a certain way. It could also leave you feeling overwhelmed with uncertainty about your future. Preparing yourself to manage your symptoms can help you feel more positive about your future.  How to manage lifestyle changes  Managing stress    Stress is your body's reaction to life changes and events, both good and bad. Stress can add to your feelings of depression. Learning to manage your stress can help lessen your feelings of depression.  Try some of the following approaches to reducing your stress (stress reduction techniques):  · Listen to music that you enjoy and that inspires you.  · Try using a meditation michael or take a meditation class.  · Develop a practice that helps you connect with your spiritual self. Walk in nature, pray, or go to a place of Religion.  · Do some deep breathing. To do this, inhale slowly through your nose. Pause at the top of your inhale for a few seconds and then exhale slowly, letting your muscles relax.  · Practice yoga to help relax and work your muscles.  Choose a stress reduction technique that suits your lifestyle and personality. These techniques take time and practice to develop. Set aside 5-15 minutes a day to do them. Therapists can offer training in these techniques. Other things you can do to manage stress include:  · Keeping a stress diary.  · Knowing your limits and saying no when you think something is too much.  · Paying attention to how you react to certain " situations. You may not be able to control everything, but you can change your reaction.  · Adding humor to your life by watching funny films or TV shows.  · Making time for activities that you enjoy and that relax you.    Medicines  Medicines, such as antidepressants, are often a part of treatment for depression.  · Talk with your pharmacist or health care provider about all the medicines, supplements, and herbal products that you take, their possible side effects, and what medicines and other products are safe to take together.  · Make sure to report any side effects you may have to your health care provider.  Relationships  Your health care provider may suggest family therapy, couples therapy, or individual therapy as part of your treatment.  How to recognize changes  Everyone responds differently to treatment for depression. As you recover from depression, you may start to:  · Have more interest in doing activities.  · Feel less hopeless.  · Have more energy.  · Overeat less often, or have a better appetite.  · Have better mental focus.  It is important to recognize if your depression is not getting better or is getting worse. The symptoms you had in the beginning may return, such as:  · Tiredness (fatigue) or low energy.  · Eating too much or too little.  · Sleeping too much or too little.  · Feeling restless, agitated, or hopeless.  · Trouble focusing or making decisions.  · Unexplained physical complaints.  · Feeling irritable, angry, or aggressive.  If you or your family members notice these symptoms coming back, let your health care provider know right away.  Follow these instructions at home:  Activity    · Try to get some form of exercise each day, such as walking, biking, swimming, or lifting weights.  · Practice stress reduction techniques.  · Engage your mind by taking a class or doing some volunteer work.    Lifestyle  · Get the right amount and quality of sleep.  · Cut down on using caffeine,  tobacco, alcohol, and other potentially harmful substances.  · Eat a healthy diet that includes plenty of vegetables, fruits, whole grains, low-fat dairy products, and lean protein. Do not eat a lot of foods that are high in solid fats, added sugars, or salt (sodium).  General instructions  · Take over-the-counter and prescription medicines only as told by your health care provider.  · Keep all follow-up visits as told by your health care provider. This is important.  Where to find support  Talking to others    Friends and family members can be sources of support and guidance. Talk to trusted friends or family members about your condition. Explain your symptoms to them, and let them know that you are working with a health care provider to treat your depression. Tell friends and family members how they also can be helpful.  Finances  · Find appropriate mental health providers that fit with your financial situation.  · Talk with your health care provider about options to get reduced prices on your medicines.  Where to find more information  You can find support in your area from:  · Anxiety and Depression Association of [place] (ADAA): www.adaa.org  · Mental Health [place]: www.mentalhealth[place].net  · National [place] on Mental Illness: www.kirsty.org  Contact a health care provider if:  · You stop taking your antidepressant medicines, and you have any of these symptoms:  ? Nausea.  ? Headache.  ? Light-headedness.  ? Chills and body aches.  ? Not being able to sleep (insomnia).  · You or your friends and family think your depression is getting worse.  Get help right away if:  · You have thoughts of hurting yourself or others.  If you ever feel like you may hurt yourself or others, or have thoughts about taking your own life, get help right away. Go to your nearest emergency department or:  · Call your local emergency services (911 in the [place]  · Call a suicide crisis helpline, such as the National Suicide  Prevention Lifeline at 1-845.969.5012. This is open 24 hours a day in the [place]  · Text the Crisis Text Line at 775777 (in the [place]  Summary  · If you are diagnosed with depression, preparing yourself to manage your symptoms is a good way to feel positive about your future.  · Work with your health care provider on a management plan that includes stress reduction techniques, medicines (if applicable), therapy, and healthy lifestyle habits.  · Keep talking with your health care provider about how your treatment is working.  · If you have thoughts about taking your own life, call a suicide crisis helpline or text a crisis text line.  This information is not intended to replace advice given to you by your health care provider. Make sure you discuss any questions you have with your health care provider.  Document Revised: 10/28/2020 Document Reviewed: 10/28/2020  [employer] Patient Education © 2021 [employer]  Clonazepam tablets  What is this medicine?  CLONAZEPAM (kloe NA ze tamela) is a benzodiazepine. It is used to treat certain types of seizures. It is also used to treat panic disorder.  This medicine may be used for other purposes; ask your health care provider or pharmacist if you have questions.  COMMON BRAND NAME(S): Ceberclon, Klonopin  What should I tell my health care provider before I take this medicine?  They need to know if you have any of these conditions:  · an alcohol or drug abuse problem  · bipolar disorder, depression, psychosis or other mental health condition  · glaucoma  · kidney or liver disease  · lung or breathing disease  · myasthenia gravis  · Parkinson's disease  · porphyria  · seizures or a history of seizures  · suicidal thoughts  · an unusual or allergic reaction to clonazepam, other benzodiazepines, foods, dyes, or preservatives  · pregnant or trying to get pregnant  · breast-feeding  How should I use this medicine?  Take this medicine by mouth with a glass of water. Follow the directions  on the prescription label. If it upsets your stomach, take it with food or milk. Take your medicine at regular intervals. Do not take it more often than directed. Do not stop taking or change the dose except on the advice of your doctor or health care professional.  A special MedGuide will be given to you by the pharmacist with each prescription and refill. Be sure to read this information carefully each time.  Talk to your pediatrician regarding the use of this medicine in children. Special care may be needed.  Overdosage: If you think you have taken too much of this medicine contact a poison control center or emergency room at once.  NOTE: This medicine is only for you. Do not share this medicine with others.  What if I miss a dose?  If you miss a dose, take it as soon as you can. If it is almost time for your next dose, take only that dose. Do not take double or extra doses.  What may interact with this medicine?  Do not take this medication with any of the following medicines:  · narcotic medicines for cough  · sodium oxybate  This medicine may also interact with the following medications:  · alcohol  · antihistamines for allergy, cough and cold  · antiviral medicines for HIV or AIDS  · certain medicines for anxiety or sleep  · certain medicines for depression, like amitriptyline, fluoxetine, sertraline  · certain medicines for fungal infections like ketoconazole and itraconazole  · certain medicines for seizures like carbamazepine, phenobarbital, phenytoin, primidone  · general anesthetics like halothane, isoflurane, methoxyflurane, propofol  · local anesthetics like lidocaine, pramoxine, tetracaine  · medicines that relax muscles for surgery  · narcotic medicines for pain  · phenothiazines like chlorpromazine, mesoridazine, prochlorperazine, thioridazine  This list may not describe all possible interactions. Give your health care provider a list of all the medicines, herbs, non-prescription drugs, or dietary  supplements you use. Also tell them if you smoke, drink alcohol, or use illegal drugs. Some items may interact with your medicine.  What should I watch for while using this medicine?  Tell your doctor or health care professional if your symptoms do not start to get better or if they get worse.  Do not stop taking except on your doctor's advice. You may develop a severe reaction. Your doctor will tell you how much medicine to take.  You may get drowsy or dizzy. Do not drive, use machinery, or do anything that needs mental alertness until you know how this medicine affects you. To reduce the risk of dizzy and fainting spells, do not stand or sit up quickly, especially if you are an older patient. Alcohol may increase dizziness and drowsiness. Avoid alcoholic drinks.  If you are taking another medicine that also causes drowsiness, you may have more side effects. Give your health care provider a list of all medicines you use. Your doctor will tell you how much medicine to take. Do not take more medicine than directed. Call emergency for help if you have problems breathing or unusual sleepiness.  The use of this medicine may increase the chance of suicidal thoughts or actions. Pay special attention to how you are responding while on this medicine. Any worsening of mood, or thoughts of suicide or dying should be reported to your health care professional right away.  What side effects may I notice from receiving this medicine?  Side effects that you should report to your doctor or health care professional as soon as possible:  · allergic reactions like skin rash, itching or hives, swelling of the face, lips, or tongue  · breathing problems  · confusion  · loss of balance or coordination  · signs and symptoms of low blood pressure like dizziness; feeling faint or lightheaded, falls; unusually weak or tired  · suicidal thoughts or mood changes  Side effects that usually do not require medical attention (report to your doctor  or health care professional if they continue or are bothersome):  · dizziness  · headache  · tiredness  · upset stomach  This list may not describe all possible side effects. Call your doctor for medical advice about side effects. You may report side effects to FDA at 8-119-KUH-3193.  Where should I keep my medicine?  Keep out of the reach of children. This medicine can be abused. Keep your medicine in a safe place to protect it from theft. Do not share this medicine with anyone. Selling or giving away this medicine is dangerous and against the law.  This medicine may cause accidental overdose and death if taken by other adults, children, or pets. Mix any unused medicine with a substance like cat litter or coffee grounds. Then throw the medicine away in a sealed container like a sealed bag or a coffee can with a lid. Do not use the medicine after the expiration date.  Store at room temperature between 15 and 30 degrees C (59 and 86 degrees F). Protect from light. Keep container tightly closed.  NOTE: This sheet is a summary. It may not cover all possible information. If you have questions about this medicine, talk to your doctor, pharmacist, or health care provider.  © 2021 [employer] (2017-05-26 18:46:32)

## 2022-02-01 NOTE — PROGRESS NOTES
"Subjective   Yulissa Spaulding is a 66 y.o. female who presents today for initial evaluation     Referring Provider: Rayne Gee at [facility]  No referring provider defined for this encounter.    Chief Complaint:  Anxiety, grief, depression    History of Present Illness: Patient presents to office today with history of depression and anxiety, began treatment approximately 15 yrs ago after hysterectomy due to increased irritability and anger.  Has been on Pristiq since that time which has been effective.  Patient was caring for  at home, whom passed away from kidney and heart failure May 5, 2021.  Patient's anxiety and depression has worsened since  passing.      Patient went out with daughter yesterday and felt increased anxiety after going to grocery while heading home, daughter was driving, denies trigger, \"I couldn't wait to get home, when it hit me it felt like it would be forever to get home.\" Denies crying, nor physical symptoms, \"I felt like a total mess inside.\" Patient reports this was the first occurrence.      Patient admits to increased fatigue, difficulty sleeping. Admits to \"horrible dreams\" regarding old house and yelling at  in dream.  Awaken after dream involving kids on a truck with feelings of heart racing.  Other times has positive dreams.  Feels scared dreaming about past family as mother had told her in the past, \"that dreams about family in the past means you are about to die.\"  Patient goes to bed at 10 pm then awakens around 1 am to use the bathroom, then returns to sleep with phone and ipad due to loop recorder. Usually awakens at 5 am, denies naps.  Patient does pray which helps with sleep.    Patient has ongoing worries due to daughter age 38, recently diagnosed with diabetes, and has to see a hematologist.  Daughter has always lived with patient and late  in the basement.     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  Patient has learned to get online, which creates some anxiety as fear of making errors.  Deals with payroll, taxes, accounting.  Patient feels tasks keep her busy, takes time with paperwork.  Son comes to house every morning to check on patient and goes to barn, at times will come to house for lunch and dinner. Daughter works as ultrasound tech travel company, goes inside prisons, etc.  Patient worries of daughter being on the road.     Patient reports taking Klonopin 0.5 mg twice daily, last filled 21.  Reports running out of Klonopin one month ago, was not aware the supply was 60 tablets. Bottle on hand during visit indicates correct dose as ordered which was filled on 21.  Patient believes bottle may be in top cabinet where all pill bottles are located, however, patient admits not taking Klonopin, \"I can tell I haven't taken it.\"  Patient later stated, \"Maybe I was putting this in my pill planner and didn't even know it\". Patient admits to filling medication planner once weekly, and places medication bottles to the side after filled into planner.     Reviewed pharmacies with patient as 3 on file, using mail order pharmacy for long term/maintence medications, and [facility] for short term medications or when needing refill as mail order takes 10-12 days.  Uses [facility] for antibiotics due to later pharmacy hours.     \"My main problem is trying to figure out what to do since [place] , and therapy won't help with that.\"    PHQ-9 Depression Screening  PHQ-9 Total Score: 11    Little interest or pleasure in doing things? 1   Feeling down, depressed, or hopeless? 1   Trouble falling or staying asleep, or sleeping too much? 3   Feeling tired or having little energy? 3   Poor appetite or overeating? 3   Feeling bad about yourself - or that you are a failure or have let yourself or your family down? 0   Trouble concentrating on things, such as " reading the newspaper or watching television? 0   Moving or speaking so slowly that other people could have noticed? Or the opposite - being so fidgety or restless that you have been moving around a lot more than usual? 0   Thoughts that you would be better off dead, or of hurting yourself in some way? 0   PHQ-9 Total Score 11     SANDRA-7  Feeling nervous, anxious or on edge: Several days  Not being able to stop or control worrying: Nearly every day  Worrying too much about different things: Nearly every day  Trouble Relaxing: Not at all  Being so restless that it is hard to sit still: Not at all  Feeling afraid as if something awful might happen: Nearly every day  Becoming easily annoyed or irritable: More than half the days  SANDRA 7 Total Score: 12  If you checked any problems, how difficult have these problems made it for you to do your work, take care of things at home, or get along with other people: Somewhat difficult    Past Surgical History:  Past Surgical History:   Procedure Laterality Date   • BREAST SURGERY Left     tumor removed   • CARDIAC SURGERY      installed LOOP recorder   • CHOLECYSTECTOMY     • GASTRIC SLEEVE LAPAROSCOPIC     • HERNIA REPAIR     • HYSTERECTOMY     • REPLACEMENT TOTAL KNEE Left        Problem List:  Patient Active Problem List   Diagnosis   • Long-term use of high-risk medication   • Atrial fibrillation (HCC)   • Female stress incontinence   • Hypertension   • Hyperlipidemia   • Osteoarthritis of knee   • Seasonal allergic rhinitis   • Hypothyroidism   • Anxiety   • Encounter for health maintenance examination in adult   • Grief reaction   • Allergies   • Paroxysmal atrial fibrillation (HCC)   • Disorder of joint prosthesis (HCC)   • History of total knee replacement   • Left knee pain   • Leukocytosis   • Limb swelling   • Mood disorder (HCC)   • Tear of medial meniscus of knee   • Bladder disorder       Allergy:   Allergies   Allergen Reactions   • Atorvastatin Myalgia     Joint  pain  Joint pain     • Methylmethacrylate Swelling     (bone cement)  (bone cement)     • Adhesive Tape Rash     Off-brand bandaids  Off-brand bandaids          Discontinued Medications:  Medications Discontinued During This Encounter   Medication Reason   • busPIRone (BUSPAR) 15 MG tablet Dose adjustment   • desvenlafaxine (Pristiq) 100 MG 24 hr tablet Reorder       Current Medications:   Current Outpatient Medications   Medication Sig Dispense Refill   • Acetaminophen (Tylenol) 325 MG capsule Take 1,000 mg by mouth.     • amiodarone (PACERONE) 400 MG tablet Take 400 mg by mouth Daily.     • apixaban (ELIQUIS) 5 MG tablet tablet Take 1 tablet by mouth Every 12 (Twelve) Hours. 180 tablet 1   • busPIRone (BUSPAR) 30 MG tablet Take 1 tablet by mouth 2 (Two) Times a Day 60 tablet 2   • cetirizine (ZyrTEC Allergy) 10 MG tablet Zyrtec 10 mg oral tablet take 1 tablet (10 mg) by oral route once daily   Active     • desvenlafaxine (Pristiq) 100 MG 24 hr tablet Take 1 tablet by mouth Daily for 180 days. Indications: Major Depressive Disorder 90 tablet 1   • dilTIAZem (TIAZAC) 180 MG 24 hr capsule Take 1 capsule by mouth Daily. 30 capsule 11   • furosemide (Lasix) 20 MG tablet Take 1 tablet by mouth Daily. 30 tablet 1   • hydroCHLOROthiazide (HYDRODIURIL) 25 MG tablet Take 1 tablet by mouth Daily. 90 tablet 1   • levothyroxine (Synthroid) 200 MCG tablet Take 1 tablet by mouth Daily. 90 tablet 1   • Melatonin 12 MG tablet Take 1 tablet by mouth Daily.     • montelukast (SINGULAIR) 10 MG tablet Take 1 tablet by mouth Every Night. 90 tablet 1   • potassium chloride 10 MEQ CR tablet Take 1 tablet by mouth 2 (Two) Times a Day. 60 tablet 2   • vitamin D3 (vitamin d) 125 MCG (5000 UT) capsule capsule Take 1 capsule by mouth Daily.     • clonazePAM (KlonoPIN) 0.5 MG tablet Take 1 tablet by mouth 2 (Two) Times a Day As Needed for Anxiety. (Patient taking differently: Take 0.5 mg by mouth 2 (Two) Times a Day As Needed for Anxiety.  Would like a refill has been out since about a month or so) 60 tablet 1   • hydrOXYzine (ATARAX) 25 MG tablet Take 1 tablet by mouth 3 (Three) Times a Day As Needed for Anxiety. May break in half if sedated 60 tablet 0     No current facility-administered medications for this visit.       Past Medical History:  Past Medical History:   Diagnosis Date   • Anxiety    • Atrial fibrillation (HCC)    • Chronic pain disorder    • Depression    • Disease of thyroid gland    • Obsessive-compulsive disorder    • Panic disorder    • Psychiatric illness    • Self-injurious behavior     patient says she picks at her skin when nervous       Past Psychiatric History:  Began Treatment:15 yrs after hysterectomy due to increased anger and irritability, meds started by PCP  Diagnoses:Depression and Anxiety  Psychiatrist:Denies  Therapist:Denies  Admission History:Denies  Medication Trials:n/a  Self Harm: Denies  Suicide Attempts:Denies   Psychosis, Anxiety, Depression: Denies    Substance Abuse History:   Types:Denies all, including illicit  Withdrawal Symptoms:Denies  Longest Period Sober:Not Applicable   AA: Not applicable     Social History:  Martial Status: ( passed away 19)  Employed:No  Kids:Yes or If so, how many 2, 1 boy and 1 girl  House:Lives in a house with daughter   History: Denies  Access to Guns:  no    Social History     Socioeconomic History   • Marital status:    Tobacco Use   • Smoking status: Never Smoker   • Smokeless tobacco: Never Used   Vaping Use   • Vaping Use: Never used   Substance and Sexual Activity   • Alcohol use: Yes     Comment: social    • Drug use: Never   • Sexual activity: Not Currently       Family History:   Suicide Attempts: Denies  Suicide Completions:Denies      Family History   Problem Relation Age of Onset   • Anxiety disorder Mother    • Dementia Mother    • Depression Mother    • Anxiety disorder Sister    • Depression Sister        Developmental  History:   Born: [place]  Siblings:1 brother, 1 sister  Childhood: Denies Abuse  High School:Completed  College:Denies    Mental Status Exam:   Hygiene:   good  Cooperation:  Cooperative  Eye Contact:  Good  Psychomotor Behavior:  Appropriate  Affect:  Appropriate  Mood: sad, depressed and anxious  Speech:  Normal  Thought Process:  Goal directed  Thought Content:  Mood congruent  Suicidal:  None  Homicidal:  None  Hallucinations:  None  Delusion:  None  Memory:  Intact  Orientation:  Person, Place, Time and Situation  Reliability:  good  Insight:  Good  Judgement:  Good  Impulse Control:  Good  Physical/Medical Issues:  Yes afib,HTN,HLD,Hypothyroidism,LT AC use, OA left knee,stress incontinence     Review of Systems:  Review of Systems   Constitutional: Negative for diaphoresis and fatigue.   HENT: Negative for drooling.    Eyes: Positive for visual disturbance.   Respiratory: Negative for cough and shortness of breath.    Cardiovascular: Positive for leg swelling. Negative for chest pain and palpitations.   Gastrointestinal: Negative for nausea and vomiting.   Endocrine: Negative for cold intolerance and heat intolerance.   Genitourinary: Negative for difficulty urinating.   Musculoskeletal: Positive for joint swelling.   Allergic/Immunologic: Negative for immunocompromised state.   Neurological: Negative for dizziness, seizures, speech difficulty and numbness.   Psychiatric/Behavioral: Positive for sleep disturbance. Negative for agitation, confusion, decreased concentration, hallucinations, self-injury and suicidal ideas. The patient is nervous/anxious. The patient is not hyperactive.          Physical Exam:  Physical Exam  Psychiatric:         Attention and Perception: Attention and perception normal.         Mood and Affect: Mood is anxious and depressed.         Speech: Speech normal.         Behavior: Behavior normal. Behavior is cooperative.         Thought Content: Thought content normal.         Cognition and  "Memory: Cognition and memory normal.         Judgment: Judgment normal.         Vital Signs:   /80   Pulse 77   Ht 176.5 cm (69.5\")   Wt 125 kg (275 lb)   SpO2 96%   BMI 40.03 kg/m²      Lab Results:   Office Visit on 11/30/2021   Component Date Value Ref Range Status   • Amphetamine Screen, Urine 11/30/2021 Negative  Negative Final   • Barbiturates Screen, Urine 11/30/2021 Negative  Negative Final   • Buprenorphine, Screen, Urine 11/30/2021 Negative  Negative Final   • Benzodiazepine Screen, Urine 11/30/2021 Negative  Negative Final   • Cocaine Screen, Urine 11/30/2021 Negative  Negative Final   • MDMA (ECSTASY) 11/30/2021 Negative  Negative Final   • Methamphetamine, Ur 11/30/2021 Negative  Negative Final   • Methadone Screen, Urine 11/30/2021 Negative  Negative Final   • Opiate Screen 11/30/2021 Negative  Negative Final   • Oxycodone Screen, Urine 11/30/2021 Negative  Negative Final   • Phencyclidine (PCP), Urine 11/30/2021 Negative  Negative Final   • THC, Screen, Urine 11/30/2021 Negative  Negative Final   • Lot Number 11/30/2021 [id number]   Final   • Expiration Date 11/30/2021 3/31/23   Final       EKG Results:  No orders to display       Imaging Results:  No Images in the past 120 days found..      Assessment/Plan   Diagnoses and all orders for this visit:    1. Mild episode of recurrent major depressive disorder (HCC) (Primary)  -     desvenlafaxine (Pristiq) 100 MG 24 hr tablet; Take 1 tablet by mouth Daily for 180 days. Indications: Major Depressive Disorder  Dispense: 90 tablet; Refill: 1    2. Generalized anxiety disorder  -     busPIRone (BUSPAR) 30 MG tablet; Take 1 tablet by mouth 2 (Two) Times a Day  Dispense: 60 tablet; Refill: 2  -     desvenlafaxine (Pristiq) 100 MG 24 hr tablet; Take 1 tablet by mouth Daily for 180 days. Indications: Major Depressive Disorder  Dispense: 90 tablet; Refill: 1  -     hydrOXYzine (ATARAX) 25 MG tablet; Take 1 tablet by mouth 3 (Three) Times a Day As Needed " for Anxiety. May break in half if sedated  Dispense: 60 tablet; Refill: 0    3. Insomnia secondary to depression with anxiety    4. Prolonged grief reaction    5. High risk medication use  -     POC Urine Drug Screen Premier Bio-Cup; Future  -     Clonazepam, Ur As Metabolite - Urine, Clean Catch; Future        Visit Diagnoses:    ICD-10-CM ICD-9-CM   1. Mild episode of recurrent major depressive disorder (HCC)  F33.0 296.31   2. Generalized anxiety disorder  F41.1 300.02   3. Insomnia secondary to depression with anxiety  F51.05 300.4    F41.8 327.02   4. Prolonged grief reaction  F43.29 309.1   5. High risk medication use  Z79.899 V58.69       PLAN:  1. Safety: No acute safety concerns  2. Therapy: Declines  3. Risk Assessment: Risk of self-harm acutely is low.  Risk factors include anxiety disorder, mood disorder, and recent psychosocial stressors (pandemic). Protective factors include no family history, denies access to guns/weapons, no present SI, no history of suicide attempts or self-harm in the past, minimal AODA, healthcare seeking, future orientation, willingness to engage in care.  Risk of self-harm chronically is also low, but could be further elevated in the event of treatment noncompliance and/or AODA.  4.   Meds: Continue Pristiq 100 mg by mouth daily to target depressed mood, energy, and motivation.  Risks, benefits, alternatives discussed with patient including including nausea, vomiting, constipation, insomnia, nervousness, sweating, and sexual side effects.  After discussion of these risks and benefits, the patient voiced understanding and agreed to proceed.    Increase Buspar from 15 mg twice daily to 30 mg by mouth twice daily to target anxiety.  Risks, benefits, alternatives discussed with patient including nausea, GI upset, mild sedation, falls risk.  After discussion of these risks and benefits, the patient voiced understanding and agreed to proceed.    Start Hydroxyzine 25 mg by mouth 3  times daily as needed anxiety, may take half of one pill to equal 12.5 mg if 25 mg is sedating.  Reminded patient to not place this medication in daily pill planner and to use when needed.  Risks, benefits, alternatives discussed with patient including sedation, dizziness, fall risk, GI upset.  After discussion of these risks and benefits, the patient voiced understanding and agreed to proceed.    5.   Labs: POC UDS with Clonazepam urine metaboilite     Will proceed with labs and CSA signed due to patient may require Klonopin if Hydroxyzine not tolerated, however, patient willing to try a non-controlled substance.  Patient has not tried to take the Klonopin on a as needed basis since original prescription 11/30/21.  Patient instructed to contact provider if symptoms are unrelieved with Hydroxyzine or symptoms of possible benzo withdrawal develops, as patient uncertain if Klonopin has been in the daily pill planner. If patient needs Klonopin restarted short term will already have UDS and CSA signed and on file.  With increase in Buspar and Hydroxyzine to manage anxiety vs. CS. Patient agrees to plan.       Patient screened positive for depression based on a PHQ-9 score of 11 on 2/1/2022. Follow-up recommendations include: Prescribed antidepressant medication treatment and Suicide Risk Assessment performed.           TREATMENT PLAN/GOALS: Continue supportive psychotherapy efforts and medications as indicated. Treatment and medication options discussed during today's visit. Patient acknowledged and verbally consented to continue with current treatment plan and was educated on the importance of compliance with treatment and follow-up appointments.    MEDICATION ISSUES:  KANNAN reviewed as expected.  Discussed medication options and treatment plan of prescribed medication as well as the risks, benefits, and side effects including potential falls, possible impaired driving and metabolic adversities among others. Patient  is agreeable to call the office with any worsening of symptoms or onset of side effects. Patient is agreeable to call 911 or go to the nearest ER should he/she begin having SI/HI. No medication side effects or related complaints today.     MEDS ORDERED DURING VISIT:  New Medications Ordered This Visit   Medications   • busPIRone (BUSPAR) 30 MG tablet     Sig: Take 1 tablet by mouth 2 (Two) Times a Day     Dispense:  60 tablet     Refill:  2   • desvenlafaxine (Pristiq) 100 MG 24 hr tablet     Sig: Take 1 tablet by mouth Daily for 180 days. Indications: Major Depressive Disorder     Dispense:  90 tablet     Refill:  1   • hydrOXYzine (ATARAX) 25 MG tablet     Sig: Take 1 tablet by mouth 3 (Three) Times a Day As Needed for Anxiety. May break in half if sedated     Dispense:  60 tablet     Refill:  0       Return in about 3 weeks (around 2/22/2022) for Next scheduled follow up.         I spent 78 minutes caring for Yulissa on this date of service. This time includes time spent by me in the following activities: preparing for the visit, reviewing tests, obtaining and/or reviewing a separately obtained history, performing a medically appropriate examination and/or evaluation, counseling and educating the patient/family/caregiver, ordering medications, tests, or procedures, referring and communicating with other health care professionals, documenting information in the medical record and care coordination.      This document has been electronically signed by RAVIN Cai  February 1, 2022 10:02 EST      Part of this note may be an electronic transcription/translation of spoken language to printed text using the Dragon Dictation System.

## 2022-02-02 ENCOUNTER — PATIENT ROUNDING (BHMG ONLY) (OUTPATIENT)
Dept: PSYCHIATRY | Facility: CLINIC | Age: 67
End: 2022-02-02

## 2022-02-02 NOTE — PROGRESS NOTES
February 2, 2022    Hello, may I speak with Yulissa Spaulding?    My name is Roxanna Pearl      I am  with [facility]  [facility]  [address]  [place] [address] [phone].    Before we get started may I verify your date of birth? 1955    I am calling to officially welcome you to our practice and ask about your recent visit. Is this a good time to talk? yes    Tell me about your visit with us. What things went well?  Patient very pleased with provider and she really took time to listen.       We're always looking for ways to make our patients' experiences even better. Do you have recommendations on ways we may improve?  yes    Overall were you satisfied with your first visit to our practice? yes       I appreciate you taking the time to speak with me today. Is there anything else I can do for you? no      Thank you, and have a great day.

## 2022-02-09 LAB — 7AMINOCLONAZEPAM UR-MCNC: NEGATIVE NG/ML

## 2022-02-15 RX ORDER — FUROSEMIDE 20 MG/1
20 TABLET ORAL DAILY
Qty: 30 TABLET | Refills: 1 | Status: SHIPPED | OUTPATIENT
Start: 2022-02-15 | End: 2022-03-27 | Stop reason: SDUPTHER

## 2022-02-15 NOTE — TELEPHONE ENCOUNTER
Caller: Yulissa Spaulding    Relationship: Self    Best call back number: [phone]  Requested Prescriptions:   Requested Prescriptions     Pending Prescriptions Disp Refills   • furosemide (Lasix) 20 MG tablet 30 tablet 1     Sig: Take 1 tablet by mouth Daily.        Pharmacy where request should be sent: [facility] [address] - [phone] [facility] [phone] FX     Does the patient have less than a 3 day supply:  [] Yes  [x] No    Rosi COPE Rep   02/15/22 09:26 EST

## 2022-02-23 DIAGNOSIS — Z12.31 SCREENING MAMMOGRAM, ENCOUNTER FOR: Primary | ICD-10-CM

## 2022-03-01 ENCOUNTER — LAB (OUTPATIENT)
Dept: LAB | Facility: HOSPITAL | Age: 67
End: 2022-03-01

## 2022-03-01 ENCOUNTER — OFFICE VISIT (OUTPATIENT)
Dept: FAMILY MEDICINE CLINIC | Age: 67
End: 2022-03-01

## 2022-03-01 VITALS
BODY MASS INDEX: 41.2 KG/M2 | DIASTOLIC BLOOD PRESSURE: 88 MMHG | HEART RATE: 64 BPM | HEIGHT: 69 IN | WEIGHT: 278.2 LBS | SYSTOLIC BLOOD PRESSURE: 146 MMHG | OXYGEN SATURATION: 94 %

## 2022-03-01 DIAGNOSIS — I48.0 PAROXYSMAL ATRIAL FIBRILLATION: ICD-10-CM

## 2022-03-01 DIAGNOSIS — I10 ESSENTIAL HYPERTENSION: ICD-10-CM

## 2022-03-01 DIAGNOSIS — E03.9 ACQUIRED HYPOTHYROIDISM: ICD-10-CM

## 2022-03-01 DIAGNOSIS — E55.9 VITAMIN D DEFICIENCY: ICD-10-CM

## 2022-03-01 DIAGNOSIS — J30.2 SEASONAL ALLERGIC RHINITIS, UNSPECIFIED TRIGGER: ICD-10-CM

## 2022-03-01 DIAGNOSIS — M71.21 SYNOVIAL CYST OF RIGHT POPLITEAL SPACE: ICD-10-CM

## 2022-03-01 DIAGNOSIS — I10 PRIMARY HYPERTENSION: Primary | ICD-10-CM

## 2022-03-01 DIAGNOSIS — F41.9 ANXIETY: ICD-10-CM

## 2022-03-01 DIAGNOSIS — E78.2 MIXED HYPERLIPIDEMIA: ICD-10-CM

## 2022-03-01 LAB
25(OH)D3 SERPL-MCNC: 40.2 NG/ML
ALBUMIN SERPL-MCNC: 4.4 G/DL (ref 3.5–5.2)
ALBUMIN/GLOB SERPL: 1.6 G/DL
ALP SERPL-CCNC: 75 U/L (ref 39–117)
ALT SERPL W P-5'-P-CCNC: 18 U/L (ref 1–33)
ANION GAP SERPL CALCULATED.3IONS-SCNC: 8.7 MMOL/L (ref 5–15)
AST SERPL-CCNC: 17 U/L (ref 1–32)
BILIRUB SERPL-MCNC: 0.3 MG/DL (ref 0–1.2)
BUN SERPL-MCNC: 15 MG/DL (ref 8–23)
BUN/CREAT SERPL: 17.9 (ref 7–25)
CALCIUM SPEC-SCNC: 9.3 MG/DL (ref 8.6–10.5)
CHLORIDE SERPL-SCNC: 102 MMOL/L (ref 98–107)
CHOLEST SERPL-MCNC: 197 MG/DL (ref 0–200)
CO2 SERPL-SCNC: 30.3 MMOL/L (ref 22–29)
CREAT SERPL-MCNC: 0.84 MG/DL (ref 0.57–1)
EGFRCR SERPLBLD CKD-EPI 2021: 75.8 ML/MIN/1.73
GLOBULIN UR ELPH-MCNC: 2.7 GM/DL
GLUCOSE SERPL-MCNC: 113 MG/DL (ref 65–99)
HDLC SERPL-MCNC: 64 MG/DL (ref 40–60)
LDLC SERPL CALC-MCNC: 110 MG/DL (ref 0–100)
LDLC/HDLC SERPL: 1.67 {RATIO}
POTASSIUM SERPL-SCNC: 4.6 MMOL/L (ref 3.5–5.2)
PROT SERPL-MCNC: 7.1 G/DL (ref 6–8.5)
SODIUM SERPL-SCNC: 141 MMOL/L (ref 136–145)
T4 FREE SERPL-MCNC: 1.46 NG/DL (ref 0.93–1.7)
TRIGL SERPL-MCNC: 131 MG/DL (ref 0–150)
TSH SERPL DL<=0.05 MIU/L-ACNC: 0.73 UIU/ML (ref 0.27–4.2)
VLDLC SERPL-MCNC: 23 MG/DL (ref 5–40)

## 2022-03-01 PROCEDURE — 82306 VITAMIN D 25 HYDROXY: CPT

## 2022-03-01 PROCEDURE — 99214 OFFICE O/P EST MOD 30 MIN: CPT | Performed by: FAMILY MEDICINE

## 2022-03-01 PROCEDURE — 80053 COMPREHEN METABOLIC PANEL: CPT

## 2022-03-01 PROCEDURE — 80061 LIPID PANEL: CPT

## 2022-03-01 PROCEDURE — 36415 COLL VENOUS BLD VENIPUNCTURE: CPT

## 2022-03-01 PROCEDURE — 84443 ASSAY THYROID STIM HORMONE: CPT

## 2022-03-01 PROCEDURE — 84439 ASSAY OF FREE THYROXINE: CPT

## 2022-03-01 RX ORDER — HYDROCHLOROTHIAZIDE 25 MG/1
25 TABLET ORAL DAILY
Qty: 90 TABLET | Refills: 1 | Status: SHIPPED | OUTPATIENT
Start: 2022-03-01 | End: 2022-03-02 | Stop reason: SDUPTHER

## 2022-03-01 RX ORDER — POTASSIUM CHLORIDE 750 MG/1
10 TABLET, FILM COATED, EXTENDED RELEASE ORAL 2 TIMES DAILY
Qty: 180 TABLET | Refills: 1 | Status: SHIPPED | OUTPATIENT
Start: 2022-03-01 | End: 2022-06-23 | Stop reason: SDUPTHER

## 2022-03-01 RX ORDER — MONTELUKAST SODIUM 10 MG/1
10 TABLET ORAL NIGHTLY
Qty: 90 TABLET | Refills: 1 | Status: SHIPPED | OUTPATIENT
Start: 2022-03-01 | End: 2022-06-23 | Stop reason: SDUPTHER

## 2022-03-01 RX ORDER — LEVOTHYROXINE SODIUM 0.2 MG/1
200 TABLET ORAL DAILY
Qty: 90 TABLET | Refills: 1 | Status: SHIPPED | OUTPATIENT
Start: 2022-03-01 | End: 2022-06-23 | Stop reason: SDUPTHER

## 2022-03-01 RX ORDER — DILTIAZEM HYDROCHLORIDE 240 MG/1
240 CAPSULE, COATED, EXTENDED RELEASE ORAL DAILY
Qty: 90 CAPSULE | Refills: 1 | Status: SHIPPED | OUTPATIENT
Start: 2022-03-01 | End: 2022-06-23 | Stop reason: SDUPTHER

## 2022-03-01 NOTE — PROGRESS NOTES
Yulissa Spaulding presents to [facility].    Chief Complaint: R posterior knee pain    Subjective       History of Present Illness:  HPI   R posterior knee pain, onset 9 days ago, feels like there is a pea size growth under the skin. S/P L TKR.     In regard to the other specified hypothyroidism.  She is currently taking Levothyroid, 200 mcg daily.  TSH was last checked 6 months ago.  The result was reported as normal.  She denies any related symptoms.  She reports no symptoms suggestive of adverse medication effect.            Dx with essential (primary) hypertension; her current cardiac medication regimen includes a diuretic ( HCTZ ) and a beta-blocker.  Compliance with treatment has been good; she takes her medication as directed.  She is tolerating the medication well without side effects.  Mrs. Spaulding does not check her blood pressure other than at her clinic appointments.          Yulissa has been grieving over the death of her  and she is seeing Delicia Brush and is now on buspar and pristiq and now she is coping very well and doesn't think she needs the clonazepam or hydroxyzine any longer.  She is sleeping well, rarely cries.  Anxiety is well controlled.  No SI/HI.    Chronic intermittent afib and she is stable on eliquis, amiodorone, diltiazem.    Chronic allergies are stable on singulair and zyrtec. No acute issues or concerns             Review of Systems:  Review of Systems   Constitutional: Negative for chills and fever.   HENT: Positive for congestion and rhinorrhea. Negative for ear pain and sore throat.    Eyes: Negative for blurred vision and visual disturbance.   Respiratory: Negative for cough, shortness of breath and wheezing.    Cardiovascular: Negative for chest pain and palpitations.   Gastrointestinal: Negative for abdominal pain, constipation, diarrhea, nausea and GERD.   Endocrine: Negative for polyuria.   Genitourinary: Negative for dysuria and  hematuria.   Musculoskeletal: Negative for arthralgias and myalgias.   Skin: Negative for rash.   Neurological: Negative for dizziness and headache.   Psychiatric/Behavioral: Negative for depressed mood. The patient is not nervous/anxious.         Objective   Medical History:  Past Medical History:   • Anxiety   • Atrial fibrillation (HCC)   • Chronic pain disorder   • Depression   • Disease of thyroid gland   • Obsessive-compulsive disorder   • Panic disorder   • Psychiatric illness   • Self-injurious behavior    patient says she picks at her skin when nervous     Past Surgical History:   • BREAST SURGERY    tumor removed   • CARDIAC SURGERY    installed LOOP recorder   • CHOLECYSTECTOMY   • GASTRIC SLEEVE LAPAROSCOPIC   • HERNIA REPAIR   • HYSTERECTOMY   • REPLACEMENT TOTAL KNEE      Family History   Problem Relation Age of Onset   • Anxiety disorder Mother    • Dementia Mother    • Depression Mother    • Anxiety disorder Sister    • Depression Sister      Social History     Tobacco Use   • Smoking status: Never Smoker   • Smokeless tobacco: Never Used   Substance Use Topics   • Alcohol use: Yes     Comment: social        Health Maintenance Due   Topic Date Due   • TDAP/TD VACCINES (1 - Tdap) Never done   • ZOSTER VACCINE (1 of 2) Never done        Immunization History   Administered Date(s) Administered   • COVID-19 (MODERNA) 1st, 2nd, 3rd Dose Only 03/22/2021, 04/19/2021, 01/28/2022   • Fluzone High Dose =>65 Years ([employer] ONLY) 10/13/2020, 08/25/2021   • Pneumococcal Conjugate 13-Valent (PCV13) 01/14/2019   • Pneumococcal Polysaccharide (PPSV23) 06/22/2021       Allergies   Allergen Reactions   • Atorvastatin Myalgia     Joint pain  Joint pain     • Methylmethacrylate Swelling     (bone cement)  (bone cement)     • Adhesive Tape Rash     Off-brand bandaids  Off-brand bandaids          Medications:  Current Outpatient Medications on File Prior to Visit   Medication Sig   • Acetaminophen (Tylenol) 325 MG capsule  "Take 1,000 mg by mouth As Needed.   • amiodarone (PACERONE) 400 MG tablet Take 400 mg by mouth Daily.   • apixaban (ELIQUIS) 5 MG tablet tablet Take 1 tablet by mouth Every 12 (Twelve) Hours.   • busPIRone (BUSPAR) 30 MG tablet Take 1 tablet by mouth 2 (Two) Times a Day   • cetirizine (ZyrTEC Allergy) 10 MG tablet Zyrtec 10 mg oral tablet take 1 tablet (10 mg) by oral route once daily   Active   • desvenlafaxine (Pristiq) 100 MG 24 hr tablet Take 1 tablet by mouth Daily for 180 days. Indications: Major Depressive Disorder   • furosemide (Lasix) 20 MG tablet Take 1 tablet by mouth Daily.   • MELATONIN PO Take 1 tablet by mouth Daily.   • [DISCONTINUED] dilTIAZem (TIAZAC) 180 MG 24 hr capsule Take 1 capsule by mouth Daily.   • [DISCONTINUED] hydroCHLOROthiazide (HYDRODIURIL) 25 MG tablet Take 1 tablet by mouth Daily.   • [DISCONTINUED] levothyroxine (Synthroid) 200 MCG tablet Take 1 tablet by mouth Daily.   • [DISCONTINUED] montelukast (SINGULAIR) 10 MG tablet Take 1 tablet by mouth Every Night.   • [DISCONTINUED] potassium chloride 10 MEQ CR tablet Take 1 tablet by mouth 2 (Two) Times a Day.   • [DISCONTINUED] vitamin D3 (vitamin d) 125 MCG (5000 UT) capsule capsule Take 1 capsule by mouth Daily.   • [DISCONTINUED] clonazePAM (KlonoPIN) 0.5 MG tablet Take 1 tablet by mouth 2 (Two) Times a Day As Needed for Anxiety. (Patient taking differently: Take 0.5 mg by mouth 2 (Two) Times a Day As Needed for Anxiety. Would like a refill has been out since about a month or so)   • [DISCONTINUED] hydrOXYzine (ATARAX) 25 MG tablet Take 1 tablet by mouth 3 (Three) Times a Day As Needed for Anxiety. May break in half if sedated     No current facility-administered medications on file prior to visit.       Vital Signs:   /88   Pulse 64   Ht 176.5 cm (69.49\")   Wt 126 kg (278 lb 3.2 oz)   SpO2 94%   BMI 40.51 kg/m²       Physical Exam:  Physical Exam  Vitals and nursing note reviewed.   Constitutional:       General: She is " not in acute distress.     Appearance: Normal appearance. She is not ill-appearing, toxic-appearing or diaphoretic.   HENT:      Head: Normocephalic and atraumatic.      Right Ear: Tympanic membrane, ear canal and external ear normal.      Left Ear: Tympanic membrane, ear canal and external ear normal.      Nose: No congestion or rhinorrhea.      Mouth/Throat:      Mouth: Mucous membranes are moist.      Pharynx: Oropharynx is clear. No oropharyngeal exudate or posterior oropharyngeal erythema.   Eyes:      Extraocular Movements: Extraocular movements intact.      Conjunctiva/sclera: Conjunctivae normal.      Pupils: Pupils are equal, round, and reactive to light.   Cardiovascular:      Rate and Rhythm: Normal rate and regular rhythm.      Heart sounds: Normal heart sounds.   Pulmonary:      Effort: Pulmonary effort is normal.      Breath sounds: Normal breath sounds. No wheezing, rhonchi or rales.   Abdominal:      General: Abdomen is flat.      Palpations: Abdomen is soft.   Musculoskeletal:      Cervical back: Neck supple. No rigidity.        Legs:    Lymphadenopathy:      Cervical: No cervical adenopathy.   Skin:     General: Skin is warm and dry.   Neurological:      Mental Status: She is alert and oriented to person, place, and time.   Psychiatric:         Mood and Affect: Mood normal.         Behavior: Behavior normal.         Result Review      The following data was reviewed by Rayne Gee MD on 03/01/2022.  Lab Results   Component Value Date    WBC 9.14 06/22/2021    HGB 14.5 06/22/2021    HCT 44.3 06/22/2021    MCV 91.9 06/22/2021     06/22/2021     Lab Results   Component Value Date    GLUCOSE 107 (H) 06/22/2021    BUN 15 06/22/2021    CREATININE 0.68 06/22/2021    EGFRIFNONA 86 06/22/2021    BCR 22.1 06/22/2021    K 4.3 06/22/2021    CO2 28.2 06/22/2021    CALCIUM 9.0 06/22/2021    ALBUMIN 4.50 06/22/2021    LABIL2 1.5 10/02/2019    AST 15 06/22/2021    ALT 12 06/22/2021     Lab Results    Component Value Date    CHOL 180 06/22/2021    CHLPL 176 10/14/2020    TRIG 94 06/22/2021    HDL 58 06/22/2021     (H) 06/22/2021     Lab Results   Component Value Date    TSH 0.711 06/22/2021     Lab Results   Component Value Date    HGBA1C 5.4 08/27/2019     No results found for: PSA                    Assessment and Plan:          Diagnoses and all orders for this visit:    1. Primary hypertension (Primary)  Comments:  elevated, will increase diltiazem to 240 mg, cont HCTZ  Orders:  -     dilTIAZem CD (Cartia XT) 240 MG 24 hr capsule; Take 1 capsule by mouth Daily.  Dispense: 90 capsule; Refill: 1    2. Mixed hyperlipidemia    3. Acquired hypothyroidism  Comments:  stable on meds, due for labs today  Orders:  -     levothyroxine (Synthroid) 200 MCG tablet; Take 1 tablet by mouth Daily.  Dispense: 90 tablet; Refill: 1  -     TSH+Free T4; Future    4. Anxiety  Comments:  stable and so much better on the buspar    5. Paroxysmal atrial fibrillation (HCC)  Comments:  stable on amiodorone and eliquis, no bleeding issues    6. Synovial cyst of right popliteal space  Comments:  referal back to her orthopedist, she will make her own appt.  Orders:  -     Diclofenac Sodium (VOLTAREN) 1 % gel gel; Apply 4 g topically to the appropriate area as directed 4 (Four) Times a Day As Needed (arthritis) for up to 30 days.  Dispense: 100 g; Refill: 2    7. Essential hypertension  -     hydroCHLOROthiazide (HYDRODIURIL) 25 MG tablet; Take 1 tablet by mouth Daily.  Dispense: 90 tablet; Refill: 1  -     Comprehensive Metabolic Panel; Future  -     Lipid Panel; Future    8. Seasonal allergic rhinitis, unspecified trigger  Comments:  stable on zyrtec and singulair  Orders:  -     montelukast (SINGULAIR) 10 MG tablet; Take 1 tablet by mouth Every Night.  Dispense: 90 tablet; Refill: 1    9. Vitamin D deficiency  Comments:  in vitamin D 5000 units daily  Orders:  -     vitamin D3 (vitamin d) 125 MCG (5000 UT) capsule capsule; Take  1 capsule by mouth Daily.  Dispense: 90 capsule; Refill: 1  -     Vitamin D 25 hydroxy; Future    Other orders  -     potassium chloride 10 MEQ CR tablet; Take 1 tablet by mouth 2 (Two) Times a Day.  Dispense: 180 tablet; Refill: 1          Follow Up   Return in about 3 months (around 6/1/2022) for Recheck.

## 2022-03-02 ENCOUNTER — TELEPHONE (OUTPATIENT)
Dept: FAMILY MEDICINE CLINIC | Age: 67
End: 2022-03-02

## 2022-03-02 DIAGNOSIS — I10 ESSENTIAL HYPERTENSION: ICD-10-CM

## 2022-03-02 RX ORDER — HYDROCHLOROTHIAZIDE 25 MG/1
25 TABLET ORAL DAILY
Qty: 30 TABLET | Refills: 0 | Status: SHIPPED | OUTPATIENT
Start: 2022-03-02 | End: 2022-06-23 | Stop reason: SDUPTHER

## 2022-03-02 NOTE — TELEPHONE ENCOUNTER
Caller: Yulissa Spaulding    Relationship: Self    Best call back number: [phone]    Requested Prescriptions:   Requested Prescriptions     Pending Prescriptions Disp Refills   • hydroCHLOROthiazide (HYDRODIURIL) 25 MG tablet 90 tablet 1     Sig: Take 1 tablet by mouth Daily.        Pharmacy where request should be sent: [facility]     Additional details provided by patient: PATIENT HAS MORE THAN A 3 DAY SUPPLY. SHE WANTS IT SENT TO [facility] AND NOT MAILORDER  Does the patient have less than a 3 day supply:  [] Yes  [x] No    Rosi Sands Rep   03/02/22 08:36 EST

## 2022-03-02 NOTE — TELEPHONE ENCOUNTER
Caller: Yulissa Spaulding    Relationship: Self    Best call back number: [phone]    What is the best time to reach you: ANY    Who are you requesting to speak with (clini  xuan staff, provider,  specific staff member): CLINICAL STAFFABNER    Do you know the name of the person who called: ABNER    What was the call regarding: MEDICATION    Do you require a callback: YES

## 2022-03-14 RX ORDER — POTASSIUM CHLORIDE 750 MG/1
10 TABLET, FILM COATED, EXTENDED RELEASE ORAL 2 TIMES DAILY
Qty: 180 TABLET | Refills: 1 | OUTPATIENT
Start: 2022-03-14

## 2022-03-14 NOTE — TELEPHONE ENCOUNTER
Caller: Yulissa Spaulding    Relationship: Self    Best call back number: [phone]    Requested Prescriptions:   Requested Prescriptions     Pending Prescriptions Disp Refills   • potassium chloride 10 MEQ CR tablet 180 tablet 1     Sig: Take 1 tablet by mouth 2 (Two) Times a Day.      Pharmacy where request should be sent: [facility]     Additional details provided by patient: PATIENT HAS TWO DAYS WORTH OF MEDICATION LEFT. PATIENT IS RUNNING OUT OF HER OTHER MEDICATIONS. SHE SAW DR KATZ ABOUT 2 WEEKS AGO AND WAS SUPPOSED TO BE SENT IN REFILLS AND HAS NOT RECEIVED ANY REFILLS TO [facility].    Does the patient have less than a 3 day supply:  [x] Yes  [] No    Rosi Harvey Rep   03/14/22 08:41 EDT

## 2022-03-27 DIAGNOSIS — F33.0 MILD EPISODE OF RECURRENT MAJOR DEPRESSIVE DISORDER: ICD-10-CM

## 2022-03-27 DIAGNOSIS — F41.1 GENERALIZED ANXIETY DISORDER: ICD-10-CM

## 2022-03-27 RX ORDER — POTASSIUM CHLORIDE 750 MG/1
10 TABLET, FILM COATED, EXTENDED RELEASE ORAL 2 TIMES DAILY
Qty: 180 TABLET | Refills: 1 | Status: CANCELLED | OUTPATIENT
Start: 2022-03-27

## 2022-03-28 RX ORDER — DESVENLAFAXINE 100 MG/1
100 TABLET, EXTENDED RELEASE ORAL DAILY
Qty: 90 TABLET | Refills: 1 | OUTPATIENT
Start: 2022-03-28 | End: 2022-09-24

## 2022-03-28 RX ORDER — AMIODARONE HYDROCHLORIDE 200 MG/1
200 TABLET ORAL
COMMUNITY
Start: 2022-03-20 | End: 2022-06-23

## 2022-03-28 NOTE — TELEPHONE ENCOUNTER
Patient should not need a refill of Desvenlafaxine 100mg at this time.  It was ordered by Pamela Brush for a #of 90 with 1 refill.  A 90 day supply with 1 Refill on 02/01/2022.  Will send to provider for refusal.

## 2022-03-29 RX ORDER — FUROSEMIDE 20 MG/1
20 TABLET ORAL DAILY
Qty: 90 TABLET | Refills: 1 | Status: SHIPPED | OUTPATIENT
Start: 2022-03-29 | End: 2022-09-22 | Stop reason: SDUPTHER

## 2022-04-08 ENCOUNTER — HOSPITAL ENCOUNTER (OUTPATIENT)
Dept: MAMMOGRAPHY | Facility: HOSPITAL | Age: 67
Discharge: HOME | End: 2022-04-08
Admitting: FAMILY MEDICINE

## 2022-04-08 DIAGNOSIS — Z12.31 SCREENING MAMMOGRAM, ENCOUNTER FOR: ICD-10-CM

## 2022-04-08 PROCEDURE — 77063 BREAST TOMOSYNTHESIS BI: CPT

## 2022-04-08 PROCEDURE — 77067 SCR MAMMO BI INCL CAD: CPT

## 2022-06-22 DIAGNOSIS — I10 ESSENTIAL HYPERTENSION: ICD-10-CM

## 2022-06-22 RX ORDER — HYDROCHLOROTHIAZIDE 25 MG/1
25 TABLET ORAL DAILY
Qty: 30 TABLET | Refills: 0 | Status: CANCELLED | OUTPATIENT
Start: 2022-06-22

## 2022-06-23 ENCOUNTER — OFFICE VISIT (OUTPATIENT)
Dept: FAMILY MEDICINE CLINIC | Age: 67
End: 2022-06-23

## 2022-06-23 VITALS
SYSTOLIC BLOOD PRESSURE: 110 MMHG | HEIGHT: 70 IN | TEMPERATURE: 98.7 F | OXYGEN SATURATION: 95 % | DIASTOLIC BLOOD PRESSURE: 72 MMHG | BODY MASS INDEX: 38.51 KG/M2 | HEART RATE: 61 BPM | WEIGHT: 269 LBS

## 2022-06-23 DIAGNOSIS — J30.2 SEASONAL ALLERGIC RHINITIS, UNSPECIFIED TRIGGER: ICD-10-CM

## 2022-06-23 DIAGNOSIS — Z00.00 ENCOUNTER FOR ANNUAL WELLNESS EXAM IN MEDICARE PATIENT: Primary | ICD-10-CM

## 2022-06-23 DIAGNOSIS — G25.81 RESTLESS LEG SYNDROME: ICD-10-CM

## 2022-06-23 DIAGNOSIS — F41.1 GENERALIZED ANXIETY DISORDER: ICD-10-CM

## 2022-06-23 DIAGNOSIS — Z23 ENCOUNTER FOR IMMUNIZATION: ICD-10-CM

## 2022-06-23 DIAGNOSIS — Z78.0 POSTMENOPAUSAL STATE: ICD-10-CM

## 2022-06-23 DIAGNOSIS — I10 ESSENTIAL HYPERTENSION: ICD-10-CM

## 2022-06-23 DIAGNOSIS — E03.9 ACQUIRED HYPOTHYROIDISM: ICD-10-CM

## 2022-06-23 DIAGNOSIS — F33.0 MILD EPISODE OF RECURRENT MAJOR DEPRESSIVE DISORDER: ICD-10-CM

## 2022-06-23 DIAGNOSIS — I10 PRIMARY HYPERTENSION: ICD-10-CM

## 2022-06-23 DIAGNOSIS — F41.9 ANXIETY: ICD-10-CM

## 2022-06-23 DIAGNOSIS — E55.9 VITAMIN D DEFICIENCY: ICD-10-CM

## 2022-06-23 DIAGNOSIS — E78.2 MIXED HYPERLIPIDEMIA: ICD-10-CM

## 2022-06-23 DIAGNOSIS — Z12.11 COLON CANCER SCREENING: ICD-10-CM

## 2022-06-23 DIAGNOSIS — Z12.31 ENCOUNTER FOR SCREENING MAMMOGRAM FOR BREAST CANCER: ICD-10-CM

## 2022-06-23 PROCEDURE — 99214 OFFICE O/P EST MOD 30 MIN: CPT | Performed by: FAMILY MEDICINE

## 2022-06-23 PROCEDURE — 1159F MED LIST DOCD IN RCRD: CPT | Performed by: FAMILY MEDICINE

## 2022-06-23 PROCEDURE — G0439 PPPS, SUBSEQ VISIT: HCPCS | Performed by: FAMILY MEDICINE

## 2022-06-23 PROCEDURE — 1170F FXNL STATUS ASSESSED: CPT | Performed by: FAMILY MEDICINE

## 2022-06-23 RX ORDER — BUSPIRONE HYDROCHLORIDE 30 MG/1
30 TABLET ORAL 2 TIMES DAILY
Qty: 180 TABLET | Refills: 1 | Status: SHIPPED | OUTPATIENT
Start: 2022-06-23 | End: 2022-12-27 | Stop reason: SDUPTHER

## 2022-06-23 RX ORDER — LEVOTHYROXINE SODIUM 0.2 MG/1
200 TABLET ORAL DAILY
Qty: 90 TABLET | Refills: 1 | Status: SHIPPED | OUTPATIENT
Start: 2022-06-23 | End: 2022-12-27 | Stop reason: SDUPTHER

## 2022-06-23 RX ORDER — DESVENLAFAXINE 100 MG/1
100 TABLET, EXTENDED RELEASE ORAL DAILY
Qty: 90 TABLET | Refills: 1 | Status: SHIPPED | OUTPATIENT
Start: 2022-06-23 | End: 2022-12-27 | Stop reason: SDUPTHER

## 2022-06-23 RX ORDER — HYDROCHLOROTHIAZIDE 25 MG/1
25 TABLET ORAL DAILY
Qty: 30 TABLET | Refills: 0 | Status: SHIPPED | OUTPATIENT
Start: 2022-06-23 | End: 2022-09-22 | Stop reason: SDUPTHER

## 2022-06-23 RX ORDER — ROPINIROLE 0.25 MG/1
0.25 TABLET, FILM COATED ORAL NIGHTLY
Qty: 30 TABLET | Refills: 2 | Status: SHIPPED | OUTPATIENT
Start: 2022-06-23 | End: 2022-09-23 | Stop reason: SDUPTHER

## 2022-06-23 RX ORDER — POTASSIUM CHLORIDE 750 MG/1
10 TABLET, FILM COATED, EXTENDED RELEASE ORAL 2 TIMES DAILY
Qty: 180 TABLET | Refills: 1 | Status: SHIPPED | OUTPATIENT
Start: 2022-06-23 | End: 2022-12-27 | Stop reason: SDUPTHER

## 2022-06-23 RX ORDER — DILTIAZEM HYDROCHLORIDE 240 MG/1
240 CAPSULE, COATED, EXTENDED RELEASE ORAL DAILY
Qty: 90 CAPSULE | Refills: 1 | Status: SHIPPED | OUTPATIENT
Start: 2022-06-23

## 2022-06-23 RX ORDER — MONTELUKAST SODIUM 10 MG/1
10 TABLET ORAL NIGHTLY
Qty: 90 TABLET | Refills: 1 | Status: SHIPPED | OUTPATIENT
Start: 2022-06-23 | End: 2022-12-27 | Stop reason: SDUPTHER

## 2022-06-23 RX ORDER — FUROSEMIDE 20 MG/1
20 TABLET ORAL DAILY
Qty: 90 TABLET | Refills: 1 | Status: SHIPPED | OUTPATIENT
Start: 2022-06-23 | End: 2022-09-29 | Stop reason: SDUPTHER

## 2022-06-23 RX ORDER — HYDROCHLOROTHIAZIDE 25 MG/1
25 TABLET ORAL DAILY
Qty: 90 TABLET | Refills: 1 | Status: SHIPPED | OUTPATIENT
Start: 2022-06-23 | End: 2022-06-23 | Stop reason: SDUPTHER

## 2022-06-23 NOTE — PROGRESS NOTES
The ABCs of the Annual Wellness Visit  Subsequent Medicare Wellness Visit    Chief Complaint   Patient presents with   • Medicare Wellness-subsequent   • Hypertension     3 month    • Hyperlipidemia   • Hypothyroidism   • Anxiety   • Atrial Fibrillation   • Vitamin D Deficiency      Subjective    History of Present Illness:  Yulissa Spaulding is a 66 y.o. female who presents for a Subsequent Medicare Wellness Visit.    The following portions of the patient's history were reviewed and   updated as appropriate: allergies, current medications, past family history, past medical history, past social history, past surgical history and problem list.    Compared to one year ago, the patient feels her physical   health is the same.    Compared to one year ago, the patient feels her mental   health is worse. Her daughter at 39 has colon cancer and she lost her  a year ago so she is stressed.    Recent Hospitalizations:  She was not admitted to the hospital during the last year.       Additionally, she presents with history of pure hypercholesterolemia, unspecified.  Date of diagnosis 2015.  Current treatment includes a low cholesterol/low fat diet.  Compliance with treatment has been fair; she does not follow a diet and exercise regimen.  SEVERE MUSCLE PAIN ON ATORVASTATIN-SHE DEFERS ANYMORE STATINS-WAS SEVERE           Additionally, she presents with hypothyroidism, this was first diagnosed >10 years ago.  She is currently taking Levothyroid, 200 mcg daily.  TSH was last checked 3 months ago.  The result was reported as normal.  She denies any related symptoms.  She reports no symptoms suggestive of adverse medication effect.        chronic atrial fibrillation and she is stable and well controlled          Additionally, she presents with essential (primary) hypertension.  her current cardiac medication regimen includes a diuretic ( HCTZ ), diltiazem and lasix.  Compliance with treatment has been good; she takes her  medication as directed.  She is tolerating the medication well without side effects.  Mrs. Spaulding does not check her blood pressure other than at her clinic appointments.      She has afib and is doing well in NSR on amiodorone/diltiazem and eliquis.  She has a loop monitor in place.  No CP/tachycardia/palpitations        chronic anxiety and she is stable on pristiq  And buspar daily, she is under a lot of stress with the death of her  and her 40 yo daughter was dx with colon cancer.  She is sleeping OK at night, denies active depression symptoms.  No suicidal ideation.  She is functional on her medication, no signs of diversion or abuse.  I recommend therapy and she defers at this time.  She has developed RLS with her legs twitching at night, keeps her awake, onset a year ago, ready to try a medication.     Current Medical Providers:  Patient Care Team:  Rayne Gee MD as PCP - General (Family Medicine)  Marco Antonio Biswas MD as Surgeon (Orthopedic Surgery)  Chun Guerrero DO as Consulting Physician (Cardiac Electrophysiology)  Pamela Brush APRN as Nurse Practitioner (Nurse Practitioner)    Outpatient Medications Prior to Visit   Medication Sig Dispense Refill   • Acetaminophen (Tylenol) 325 MG capsule Take 1,000 mg by mouth As Needed.     • amiodarone (PACERONE) 400 MG tablet Take 400 mg by mouth Daily.     • cetirizine (zyrTEC) 10 MG tablet Zyrtec 10 mg oral tablet take 1 tablet (10 mg) by oral route once daily   Active     • MELATONIN PO Take 1 tablet by mouth Daily.     • busPIRone (BUSPAR) 30 MG tablet Take 1 tablet by mouth 2 (Two) Times a Day 60 tablet 2   • desvenlafaxine (Pristiq) 100 MG 24 hr tablet Take 1 tablet by mouth Daily for 180 days. Indications: Major Depressive Disorder 90 tablet 1   • dilTIAZem CD (Cartia XT) 240 MG 24 hr capsule Take 1 capsule by mouth Daily. 90 capsule 1   • furosemide (Lasix) 20 MG tablet Take 1 tablet by mouth Daily. 90 tablet 1   •  hydroCHLOROthiazide (HYDRODIURIL) 25 MG tablet Take 1 tablet by mouth Daily. 30 tablet 0   • levothyroxine (Synthroid) 200 MCG tablet Take 1 tablet by mouth Daily. 90 tablet 1   • montelukast (SINGULAIR) 10 MG tablet Take 1 tablet by mouth Every Night. 90 tablet 1   • potassium chloride 10 MEQ CR tablet Take 1 tablet by mouth 2 (Two) Times a Day. 180 tablet 1   • vitamin D3 (vitamin d) 125 MCG (5000 UT) capsule capsule Take 1 capsule by mouth Daily. 90 capsule 1   • amiodarone (PACERONE) 200 MG tablet 200 mg.       No facility-administered medications prior to visit.       No opioid medication identified on active medication list. I have reviewed chart for other potential  high risk medication/s and harmful drug interactions in the elderly.          Aspirin is not on active medication list.  Aspirin use is not indicated based on review of current medical condition/s. Risk of harm outweighs potential benefits.  .    Patient Active Problem List   Diagnosis   • Long-term use of high-risk medication   • Atrial fibrillation (HCC)   • Female stress incontinence   • Essential hypertension   • Mixed hyperlipidemia   • Osteoarthritis of knee   • Seasonal allergic rhinitis   • Hypothyroidism   • Anxiety   • Encounter for health maintenance examination in adult   • Grief reaction   • Allergies   • Paroxysmal atrial fibrillation (HCC)   • Disorder of joint prosthesis (HCC)   • History of total knee replacement   • Left knee pain   • Leukocytosis   • Limb swelling   • Mood disorder (HCC)   • Tear of medial meniscus of knee   • Bladder disorder   • Postmenopausal state   • Encounter for annual wellness exam in Medicare patient     Advance Care Planning  Advance Directive is not on file.  ACP discussion was held with the patient during this visit. Patient has an advance directive (not in EMR), copy requested.    Review of Systems   Constitutional: Negative for chills, fatigue and fever.   HENT: Negative for ear pain, sinus  "pressure and sore throat.    Respiratory: Negative for cough, shortness of breath and wheezing.    Cardiovascular: Negative for chest pain, palpitations and leg swelling.   Gastrointestinal: Negative for abdominal pain, blood in stool, constipation, diarrhea, nausea and vomiting.   Genitourinary: Negative for dysuria.   Skin: Negative for rash.   Neurological: Negative for dizziness.   Psychiatric/Behavioral: Negative for sleep disturbance and suicidal ideas.        Objective    Vitals:    06/23/22 0849   BP: 110/72   BP Location: Left arm   Patient Position: Sitting   Cuff Size: Large Adult   Pulse: 61   Temp: 98.7 °F (37.1 °C)   TempSrc: Oral   SpO2: 95%   Weight: 122 kg (269 lb)   Height: 176.5 cm (69.5\")     BMI Readings from Last 1 Encounters:   06/23/22 39.15 kg/m²   BMI is above normal parameters. Recommendations include: exercise counseling and nutrition counseling    Does the patient have evidence of cognitive impairment? No    Physical Exam  Vitals and nursing note reviewed.   Constitutional:       General: She is not in acute distress.     Appearance: Normal appearance. She is not ill-appearing, toxic-appearing or diaphoretic.   HENT:      Head: Normocephalic and atraumatic.      Right Ear: Tympanic membrane, ear canal and external ear normal.      Left Ear: Tympanic membrane, ear canal and external ear normal.      Nose: No congestion or rhinorrhea.      Mouth/Throat:      Mouth: Mucous membranes are moist.      Pharynx: Oropharynx is clear. No oropharyngeal exudate or posterior oropharyngeal erythema.   Eyes:      Extraocular Movements: Extraocular movements intact.      Conjunctiva/sclera: Conjunctivae normal.      Pupils: Pupils are equal, round, and reactive to light.   Cardiovascular:      Rate and Rhythm: Normal rate and regular rhythm.      Heart sounds: Normal heart sounds.   Pulmonary:      Effort: Pulmonary effort is normal.      Breath sounds: Normal breath sounds. No wheezing, rhonchi or " rales.   Abdominal:      General: Abdomen is flat.      Palpations: Abdomen is soft.   Musculoskeletal:      Cervical back: Neck supple. No rigidity.   Lymphadenopathy:      Cervical: No cervical adenopathy.   Skin:     General: Skin is warm and dry.   Neurological:      Mental Status: She is alert and oriented to person, place, and time.   Psychiatric:         Mood and Affect: Mood normal.         Behavior: Behavior normal.         Thought Content: Thought content normal.         Judgment: Judgment normal.                 HEALTH RISK ASSESSMENT    Smoking Status:  Social History     Tobacco Use   Smoking Status Never Smoker   Smokeless Tobacco Never Used     Alcohol Consumption:  Social History     Substance and Sexual Activity   Alcohol Use Yes    Comment: social      Fall Risk Screen:    STEADI Fall Risk Assessment was completed, and patient is at LOW risk for falls.Assessment completed on:6/23/2022    Depression Screening:  PHQ-2/PHQ-9 Depression Screening 6/23/2022   Retired PHQ-9 Total Score -   Retired Total Score -   Little Interest or Pleasure in Doing Things 0-->not at all   Feeling Down, Depressed or Hopeless 1-->several days   PHQ-9: Brief Depression Severity Measure Score 1       Health Habits and Functional and Cognitive Screening:  Functional & Cognitive Status 6/23/2022   Do you have difficulty preparing food and eating? No   Do you have difficulty bathing yourself, getting dressed or grooming yourself? No   Do you have difficulty using the toilet? No   Do you have difficulty moving around from place to place? No   Do you have trouble with steps or getting out of a bed or a chair? No   Current Diet Low Carb Diet   Dental Exam Not up to date   Eye Exam Up to date   Exercise (times per week) 0 times per week   Current Exercises Include No Regular Exercise   Do you need help using the phone?  No   Are you deaf or do you have serious difficulty hearing?  No   Do you need help with transportation? No   Do  you need help shopping? No   Do you need help preparing meals?  No   Do you need help with housework?  No   Do you need help with laundry? No   Do you need help taking your medications? No   Do you need help managing money? No   Do you ever drive or ride in a car without wearing a seat belt? No   Have you felt unusual stress, anger or loneliness in the last month? Yes   Who do you live with? Child   If you need help, do you have trouble finding someone available to you? No   Have you been bothered in the last four weeks by sexual problems? No   Do you have difficulty concentrating, remembering or making decisions? No       Age-appropriate Screening Schedule:  Refer to the list below for future screening recommendations based on patient's age, sex and/or medical conditions. Orders for these recommended tests are listed in the plan section. The patient has been provided with a written plan.    Health Maintenance   Topic Date Due   • TDAP/TD VACCINES (1 - Tdap) 06/23/2023 (Originally 11/7/1974)   • ZOSTER VACCINE (1 of 2) 06/23/2023 (Originally 11/7/2003)   • DXA SCAN  06/30/2022   • INFLUENZA VACCINE  10/01/2022   • LIPID PANEL  03/01/2023   • MAMMOGRAM  04/08/2024                The following data was reviewed by: Rayne Gee MD on 06/23/2022:  CMP    CMP 3/1/22   Glucose 113 (A)   BUN 15   Creatinine 0.84   Sodium 141   Potassium 4.6   Chloride 102   Calcium 9.3   Albumin 4.40   Total Bilirubin 0.3   Alkaline Phosphatase 75   AST (SGOT) 17   ALT (SGPT) 18   (A) Abnormal value              Lipid Panel    Lipid Panel 3/1/22   Total Cholesterol 197   Triglycerides 131   HDL Cholesterol 64 (A)   VLDL Cholesterol 23   LDL Cholesterol  110 (A)   LDL/HDL Ratio 1.67   (A) Abnormal value            TSH    TSH 3/1/22   TSH 0.727               UA    Urinalysis 3/20/22   Specific Gravity, UA >=1.030   Ketones, UA NEGATIVE   Blood, UA TRACE (A)   Leukocytes, UA NEGATIVE   Nitrite, UA NEGATIVE   (A) Abnormal value                        Assessment & Plan   CMS Preventative Services Quick Reference  Risk Factors Identified During Encounter  Cardiovascular Disease  Chronic Pain   Depression/Dysphoria  Hearing Problem  Immunizations Discussed/Encouraged (specific Immunizations; Tdap and Shingrix  Inactivity/Sedentary  Obesity/Overweight   Urinary Incontinence  The above risks/problems have been discussed with the patient.  Follow up actions/plans if indicated are seen below in the Assessment/Plan Section.  Pertinent information has been shared with the patient in the After Visit Summary.    Diagnoses and all orders for this visit:    1. Encounter for annual wellness exam in Medicare patient (Primary)  Assessment & Plan:  MEDICARE WELLNESS EXAM-SHE IS UTD MAMMOGRAM, UTD on  DEXA/COLONOSCOPY, PAP AND PELVIC EXAM is due, UTD ON VACCINATIONS INCLUDING: FLU,PREVNAR/PNUEMOVAX AND COVID, DUE FOR SHINGLES VACCINE/TD VACCINE, MINIMAL FALL RISK, NO MEMORY ISSUES,ONGOING BUT STABLE DEPRESSION, SHE LIVES WITH HER DAUGHTER, SHE IS ABLE TO DRIVE AND PERFORM ADL'S/FINANCES INDEPENDENTLY, HEARING IS POOR-DEFERS RETESTING, SHE WAS GIVEN A HA ON A LIVING WILL.  CURRENT DOCTOR LIST UPDATED       2. Encounter for screening mammogram for breast cancer    3. Encounter for immunization    4. Colon cancer screening    5. Postmenopausal state    6. Essential hypertension  Assessment & Plan:  Hypertension is unchanged.  Continue current treatment regimen.  Dietary sodium restriction.  Weight loss.  Regular aerobic exercise.  Blood pressure will be reassessed at the next regular appointment.    Orders:  -     Discontinue: hydroCHLOROthiazide (HYDRODIURIL) 25 MG tablet; Take 1 tablet by mouth Daily.  Dispense: 90 tablet; Refill: 1  -     hydroCHLOROthiazide (HYDRODIURIL) 25 MG tablet; Take 1 tablet by mouth Daily.  Dispense: 30 tablet; Refill: 0    7. Mixed hyperlipidemia  Assessment & Plan:  Lipid abnormalities are unchanged.  Nutritional counseling was  provided.  Lipids will be reassessed in 6 months.      8. Acquired hypothyroidism  Assessment & Plan:  Stable on current medication, tolerates meds well, labs are UTD    Orders:  -     levothyroxine (Synthroid) 200 MCG tablet; Take 1 tablet by mouth Daily.  Dispense: 90 tablet; Refill: 1    9. Anxiety    10. Seasonal allergic rhinitis, unspecified trigger  -     montelukast (SINGULAIR) 10 MG tablet; Take 1 tablet by mouth Every Night.  Dispense: 90 tablet; Refill: 1    11. Generalized anxiety disorder  -     busPIRone (BUSPAR) 30 MG tablet; Take 1 tablet by mouth 2 (Two) Times a Day  Dispense: 180 tablet; Refill: 1  -     desvenlafaxine (Pristiq) 100 MG 24 hr tablet; Take 1 tablet by mouth Daily for 180 days. Indications: Major Depressive Disorder  Dispense: 90 tablet; Refill: 1    12. Mild episode of recurrent major depressive disorder (HCC)  -     desvenlafaxine (Pristiq) 100 MG 24 hr tablet; Take 1 tablet by mouth Daily for 180 days. Indications: Major Depressive Disorder  Dispense: 90 tablet; Refill: 1    13. Primary hypertension  Comments:  elevated, will increase diltiazem to 240 mg, cont HCTZ  Orders:  -     dilTIAZem CD (Cartia XT) 240 MG 24 hr capsule; Take 1 capsule by mouth Daily.  Dispense: 90 capsule; Refill: 1    14. Acquired hypothyroidism  Comments:  stable on meds, due for labs today  Assessment & Plan:  Stable on current medication, tolerates meds well, labs are UTD    Orders:  -     levothyroxine (Synthroid) 200 MCG tablet; Take 1 tablet by mouth Daily.  Dispense: 90 tablet; Refill: 1    15. Seasonal allergic rhinitis, unspecified trigger  Comments:  stable on zyrtec and singulair  Orders:  -     montelukast (SINGULAIR) 10 MG tablet; Take 1 tablet by mouth Every Night.  Dispense: 90 tablet; Refill: 1    16. Vitamin D deficiency  Comments:  in vitamin D 5000 units daily  Orders:  -     vitamin D3 (vitamin d) 125 MCG (5000 UT) capsule capsule; Take 1 capsule by mouth Daily.  Dispense: 90 capsule;  Refill: 1    17. Restless leg syndrome  Comments:  will try her on requip    Other orders  -     furosemide (Lasix) 20 MG tablet; Take 1 tablet by mouth Daily.  Dispense: 90 tablet; Refill: 1  -     potassium chloride 10 MEQ CR tablet; Take 1 tablet by mouth 2 (Two) Times a Day.  Dispense: 180 tablet; Refill: 1  -     rOPINIRole (REQUIP) 0.25 MG tablet; Take 1 tablet by mouth 1 hour before bedtime.  Dispense: 30 tablet; Refill: 2      Follow Up:   Return in about 3 months (around 9/23/2022) for Recheck.     An After Visit Summary and PPPS were made available to the patient.

## 2022-06-23 NOTE — ASSESSMENT & PLAN NOTE
MEDICARE WELLNESS EXAM-SHE IS UTD MAMMOGRAM, UTD on  DEXA/COLONOSCOPY, PAP AND PELVIC EXAM is due, UTD ON VACCINATIONS INCLUDING: FLU,PREVNAR/PNUEMOVAX AND COVID, DUE FOR SHINGLES VACCINE/TD VACCINE, MINIMAL FALL RISK, NO MEMORY ISSUES,ONGOING BUT STABLE DEPRESSION, SHE LIVES WITH HER DAUGHTER, SHE IS ABLE TO DRIVE AND PERFORM ADL'S/FINANCES INDEPENDENTLY, HEARING IS POOR-DEFERS RETESTING, SHE WAS GIVEN A HA ON A LIVING WILL.  CURRENT DOCTOR LIST UPDATED

## 2022-07-25 DIAGNOSIS — F41.1 GENERALIZED ANXIETY DISORDER: ICD-10-CM

## 2022-07-25 RX ORDER — BUSPIRONE HYDROCHLORIDE 30 MG/1
30 TABLET ORAL 2 TIMES DAILY
Qty: 60 TABLET | Refills: 2 | Status: CANCELLED | OUTPATIENT
Start: 2022-07-25 | End: 2022-10-23

## 2022-07-25 NOTE — TELEPHONE ENCOUNTER
Med Refill Request patient only seen 1 time in February 2022 all other appts scheduled with Pamela were cancelled.  Please advise

## 2022-07-27 DIAGNOSIS — F41.1 GENERALIZED ANXIETY DISORDER: ICD-10-CM

## 2022-07-27 RX ORDER — BUSPIRONE HYDROCHLORIDE 30 MG/1
30 TABLET ORAL 2 TIMES DAILY
Qty: 60 TABLET | Refills: 2 | OUTPATIENT
Start: 2022-07-27 | End: 2022-10-25

## 2022-07-27 NOTE — TELEPHONE ENCOUNTER
Med Refill Refused again already refused by Pamela on 07/25/2022 already authorized by another provider

## 2022-09-22 DIAGNOSIS — I10 ESSENTIAL HYPERTENSION: ICD-10-CM

## 2022-09-22 RX ORDER — ROPINIROLE 0.25 MG/1
0.25 TABLET, FILM COATED ORAL NIGHTLY
Qty: 30 TABLET | Refills: 2 | Status: CANCELLED | OUTPATIENT
Start: 2022-09-22

## 2022-09-23 RX ORDER — HYDROCHLOROTHIAZIDE 25 MG/1
25 TABLET ORAL DAILY
Qty: 90 TABLET | Refills: 0 | Status: CANCELLED | OUTPATIENT
Start: 2022-09-23

## 2022-09-23 RX ORDER — ROPINIROLE 0.25 MG/1
0.25 TABLET, FILM COATED ORAL NIGHTLY
Qty: 30 TABLET | Refills: 0 | Status: SHIPPED | OUTPATIENT
Start: 2022-09-23 | End: 2022-10-21 | Stop reason: SDUPTHER

## 2022-09-23 RX ORDER — FUROSEMIDE 20 MG/1
20 TABLET ORAL DAILY
Qty: 90 TABLET | Refills: 0 | Status: CANCELLED | OUTPATIENT
Start: 2022-09-23

## 2022-09-23 NOTE — TELEPHONE ENCOUNTER
Caller: Yulissa Spaulding    Relationship: Self    Best call back number: [phone]     Requested Prescriptions:   Requested Prescriptions     Pending Prescriptions Disp Refills   • rOPINIRole (REQUIP) 0.25 MG tablet 30 tablet 2     Sig: Take 1 tablet by mouth 1 hour before bedtime.        Pharmacy where request should be sent: [facility]     Additional details provided by patient: ONLY HAS 1 PILL LEFT    Does the patient have less than a 3 day supply:  [x] Yes  [] No    Rosi Finn Rep   09/23/22 08:48 EDT

## 2022-09-23 NOTE — TELEPHONE ENCOUNTER
Caller: Yulissa Spaulding    Relationship to patient: Self    Best call back number: [phone]    Patient is needing: PATIENT WAS TOLD AT THE PHARMACY THIS MEDICATION WAS DENIED. SHE SAYS SHE WILL NEED THIS MEDICATION TODAY SHE IS ON HER LAST PILL. PLEASE SUBMIT TO PHARMACY ASAP.

## 2022-09-26 DIAGNOSIS — I10 ESSENTIAL HYPERTENSION: ICD-10-CM

## 2022-09-26 RX ORDER — FUROSEMIDE 20 MG/1
20 TABLET ORAL DAILY
Qty: 90 TABLET | Refills: 0 | Status: CANCELLED | OUTPATIENT
Start: 2022-09-23

## 2022-09-26 RX ORDER — HYDROCHLOROTHIAZIDE 25 MG/1
25 TABLET ORAL DAILY
Qty: 90 TABLET | Refills: 0 | Status: CANCELLED | OUTPATIENT
Start: 2022-09-23

## 2022-09-28 DIAGNOSIS — I10 ESSENTIAL HYPERTENSION: ICD-10-CM

## 2022-09-29 RX ORDER — HYDROCHLOROTHIAZIDE 25 MG/1
25 TABLET ORAL DAILY
Qty: 90 TABLET | Refills: 0 | Status: SHIPPED | OUTPATIENT
Start: 2022-09-29 | End: 2022-12-27 | Stop reason: SDUPTHER

## 2022-09-29 RX ORDER — FUROSEMIDE 20 MG/1
20 TABLET ORAL DAILY
Qty: 90 TABLET | Refills: 0 | Status: SHIPPED | OUTPATIENT
Start: 2022-09-29 | End: 2022-12-27 | Stop reason: SDUPTHER

## 2022-10-13 ENCOUNTER — CLINICAL SUPPORT (OUTPATIENT)
Dept: FAMILY MEDICINE CLINIC | Age: 67
End: 2022-10-13

## 2022-10-13 DIAGNOSIS — Z23 NEED FOR INFLUENZA VACCINATION: Primary | ICD-10-CM

## 2022-10-13 PROCEDURE — G0008 ADMIN INFLUENZA VIRUS VAC: HCPCS | Performed by: FAMILY MEDICINE

## 2022-10-13 PROCEDURE — 90686 IIV4 VACC NO PRSV 0.5 ML IM: CPT | Performed by: FAMILY MEDICINE

## 2022-10-21 RX ORDER — ROPINIROLE 0.25 MG/1
0.25 TABLET, FILM COATED ORAL NIGHTLY
Qty: 30 TABLET | Refills: 0 | Status: SHIPPED | OUTPATIENT
Start: 2022-10-21 | End: 2022-11-29 | Stop reason: SDUPTHER

## 2022-11-29 RX ORDER — ROPINIROLE 0.25 MG/1
0.25 TABLET, FILM COATED ORAL NIGHTLY
Qty: 90 TABLET | Refills: 0 | Status: SHIPPED | OUTPATIENT
Start: 2022-11-29 | End: 2022-12-27 | Stop reason: SDUPTHER

## 2022-11-29 NOTE — TELEPHONE ENCOUNTER
Caller: ChellyYulissa    Relationship: Self    Best call back number: [phone]    Requested Prescriptions:   Requested Prescriptions     Pending Prescriptions Disp Refills   • rOPINIRole (REQUIP) 0.25 MG tablet 30 tablet 0     Sig: Take 1 tablet by mouth 1 hour before bedtime.      Pharmacy where request should be sent: [facility]     Additional details provided by patient: PATIENT IS FULLY OUT OF THE MEDICATION. SHE WOULD LIKE TO KNOW IF SHE CAN GET MORE THAN A 30 DAY SUPPLY.    Does the patient have less than a 3 day supply:  [x] Yes  [] No    Would you like a call back once the refill request has been completed: [x] Yes [] No    If the office needs to give you a call back, can they leave a voicemail: [x] Yes [] No    Rosi Harvey Rep   11/29/22 09:31 EST

## 2022-12-27 ENCOUNTER — OFFICE VISIT (OUTPATIENT)
Dept: FAMILY MEDICINE CLINIC | Age: 67
End: 2022-12-27

## 2022-12-27 ENCOUNTER — LAB (OUTPATIENT)
Dept: LAB | Facility: HOSPITAL | Age: 67
End: 2022-12-27

## 2022-12-27 VITALS
HEIGHT: 70 IN | BODY MASS INDEX: 38.02 KG/M2 | DIASTOLIC BLOOD PRESSURE: 71 MMHG | HEART RATE: 59 BPM | OXYGEN SATURATION: 92 % | WEIGHT: 265.6 LBS | SYSTOLIC BLOOD PRESSURE: 140 MMHG

## 2022-12-27 DIAGNOSIS — E66.3 OVERWEIGHT: ICD-10-CM

## 2022-12-27 DIAGNOSIS — F41.9 ANXIETY: ICD-10-CM

## 2022-12-27 DIAGNOSIS — E03.9 ACQUIRED HYPOTHYROIDISM: ICD-10-CM

## 2022-12-27 DIAGNOSIS — Z12.11 COLON CANCER SCREENING: ICD-10-CM

## 2022-12-27 DIAGNOSIS — E78.2 MIXED HYPERLIPIDEMIA: ICD-10-CM

## 2022-12-27 DIAGNOSIS — E55.9 VITAMIN D DEFICIENCY: ICD-10-CM

## 2022-12-27 DIAGNOSIS — I10 ESSENTIAL HYPERTENSION: ICD-10-CM

## 2022-12-27 DIAGNOSIS — Z23 ENCOUNTER FOR IMMUNIZATION: ICD-10-CM

## 2022-12-27 DIAGNOSIS — Z01.419 WELL WOMAN EXAM: Primary | ICD-10-CM

## 2022-12-27 DIAGNOSIS — N39.3 FEMALE STRESS INCONTINENCE: ICD-10-CM

## 2022-12-27 DIAGNOSIS — Z78.0 POSTMENOPAUSAL STATE: ICD-10-CM

## 2022-12-27 DIAGNOSIS — J30.2 SEASONAL ALLERGIC RHINITIS, UNSPECIFIED TRIGGER: ICD-10-CM

## 2022-12-27 DIAGNOSIS — F51.01 PRIMARY INSOMNIA: ICD-10-CM

## 2022-12-27 DIAGNOSIS — G25.81 RESTLESS LEG SYNDROME: ICD-10-CM

## 2022-12-27 DIAGNOSIS — I48.11 LONGSTANDING PERSISTENT ATRIAL FIBRILLATION: ICD-10-CM

## 2022-12-27 DIAGNOSIS — Z12.31 ENCOUNTER FOR SCREENING MAMMOGRAM FOR BREAST CANCER: ICD-10-CM

## 2022-12-27 DIAGNOSIS — F39 MOOD DISORDER: ICD-10-CM

## 2022-12-27 LAB
25(OH)D3 SERPL-MCNC: 83.9 NG/ML (ref 30–100)
ALBUMIN SERPL-MCNC: 4.5 G/DL (ref 3.5–5.2)
ALBUMIN/GLOB SERPL: 2.1 G/DL
ALP SERPL-CCNC: 78 U/L (ref 39–117)
ALT SERPL W P-5'-P-CCNC: 20 U/L (ref 1–33)
ANION GAP SERPL CALCULATED.3IONS-SCNC: 8.6 MMOL/L (ref 5–15)
AST SERPL-CCNC: 22 U/L (ref 1–32)
BILIRUB BLD-MCNC: NEGATIVE MG/DL
BILIRUB SERPL-MCNC: 0.3 MG/DL (ref 0–1.2)
BUN SERPL-MCNC: 20 MG/DL (ref 8–23)
BUN/CREAT SERPL: 23.3 (ref 7–25)
CALCIUM SPEC-SCNC: 9.1 MG/DL (ref 8.6–10.5)
CHLORIDE SERPL-SCNC: 100 MMOL/L (ref 98–107)
CHOLEST SERPL-MCNC: 202 MG/DL (ref 0–200)
CLARITY, POC: CLEAR
CO2 SERPL-SCNC: 30.4 MMOL/L (ref 22–29)
COLOR UR: YELLOW
CREAT SERPL-MCNC: 0.86 MG/DL (ref 0.57–1)
DEPRECATED RDW RBC AUTO: 47.6 FL (ref 37–54)
DEVELOPER EXPIRATION DATE: NORMAL
DEVELOPER LOT NUMBER: NORMAL
EGFRCR SERPLBLD CKD-EPI 2021: 73.2 ML/MIN/1.73
ERYTHROCYTE [DISTWIDTH] IN BLOOD BY AUTOMATED COUNT: 13.6 % (ref 12.3–15.4)
EXPIRATION DATE: ABNORMAL
EXPIRATION DATE: NORMAL
FECAL OCCULT BLOOD SCREEN, POC: NEGATIVE
GLOBULIN UR ELPH-MCNC: 2.1 GM/DL
GLUCOSE SERPL-MCNC: 104 MG/DL (ref 65–99)
GLUCOSE UR STRIP-MCNC: NEGATIVE MG/DL
HCT VFR BLD AUTO: 43.6 % (ref 34–46.6)
HDLC SERPL-MCNC: 67 MG/DL (ref 40–60)
HGB BLD-MCNC: 14 G/DL (ref 12–15.9)
KETONES UR QL: NEGATIVE
LDLC SERPL CALC-MCNC: 120 MG/DL (ref 0–100)
LDLC/HDLC SERPL: 1.77 {RATIO}
LEUKOCYTE EST, POC: ABNORMAL
Lab: 1612
Lab: ABNORMAL
MCH RBC QN AUTO: 30.3 PG (ref 26.6–33)
MCHC RBC AUTO-ENTMCNC: 32.1 G/DL (ref 31.5–35.7)
MCV RBC AUTO: 94.4 FL (ref 79–97)
NEGATIVE CONTROL: NEGATIVE
NITRITE UR-MCNC: NEGATIVE MG/ML
PH UR: 7.5 [PH] (ref 5–8)
PLATELET # BLD AUTO: 270 10*3/MM3 (ref 140–450)
PMV BLD AUTO: 9.9 FL (ref 6–12)
POSITIVE CONTROL: POSITIVE
POTASSIUM SERPL-SCNC: 4.1 MMOL/L (ref 3.5–5.2)
PROT SERPL-MCNC: 6.6 G/DL (ref 6–8.5)
PROT UR STRIP-MCNC: ABNORMAL MG/DL
RBC # BLD AUTO: 4.62 10*6/MM3 (ref 3.77–5.28)
RBC # UR STRIP: NEGATIVE /UL
SODIUM SERPL-SCNC: 139 MMOL/L (ref 136–145)
SP GR UR: 1.02 (ref 1–1.03)
T4 FREE SERPL-MCNC: 1.64 NG/DL (ref 0.93–1.7)
TRIGL SERPL-MCNC: 83 MG/DL (ref 0–150)
TSH SERPL DL<=0.05 MIU/L-ACNC: 1.58 UIU/ML (ref 0.27–4.2)
UROBILINOGEN UR QL: ABNORMAL
VLDLC SERPL-MCNC: 15 MG/DL (ref 5–40)
WBC NRBC COR # BLD: 8.08 10*3/MM3 (ref 3.4–10.8)

## 2022-12-27 PROCEDURE — 82270 OCCULT BLOOD FECES: CPT | Performed by: FAMILY MEDICINE

## 2022-12-27 PROCEDURE — 81003 URINALYSIS AUTO W/O SCOPE: CPT | Performed by: FAMILY MEDICINE

## 2022-12-27 PROCEDURE — 80053 COMPREHEN METABOLIC PANEL: CPT

## 2022-12-27 PROCEDURE — 85027 COMPLETE CBC AUTOMATED: CPT

## 2022-12-27 PROCEDURE — 36415 COLL VENOUS BLD VENIPUNCTURE: CPT

## 2022-12-27 PROCEDURE — 99214 OFFICE O/P EST MOD 30 MIN: CPT | Performed by: FAMILY MEDICINE

## 2022-12-27 PROCEDURE — 84439 ASSAY OF FREE THYROXINE: CPT

## 2022-12-27 PROCEDURE — 84443 ASSAY THYROID STIM HORMONE: CPT

## 2022-12-27 PROCEDURE — 0124A PR ADM SARSCOV2 30MCG/0.3ML BST: CPT | Performed by: FAMILY MEDICINE

## 2022-12-27 PROCEDURE — 91312 COVID-19 (PFIZER) BIVALENT BOOSTER 12+YRS: CPT | Performed by: FAMILY MEDICINE

## 2022-12-27 PROCEDURE — 80061 LIPID PANEL: CPT

## 2022-12-27 PROCEDURE — 82306 VITAMIN D 25 HYDROXY: CPT

## 2022-12-27 RX ORDER — FUROSEMIDE 20 MG/1
20 TABLET ORAL DAILY
Qty: 90 TABLET | Refills: 1 | Status: SHIPPED | OUTPATIENT
Start: 2022-12-27 | End: 2022-12-27

## 2022-12-27 RX ORDER — HYDROCHLOROTHIAZIDE 25 MG/1
25 TABLET ORAL DAILY
Qty: 90 TABLET | Refills: 1 | Status: SHIPPED | OUTPATIENT
Start: 2022-12-27

## 2022-12-27 RX ORDER — POTASSIUM CHLORIDE 750 MG/1
10 TABLET, FILM COATED, EXTENDED RELEASE ORAL 2 TIMES DAILY
Qty: 180 TABLET | Refills: 1 | Status: SHIPPED | OUTPATIENT
Start: 2022-12-27

## 2022-12-27 RX ORDER — CETIRIZINE HYDROCHLORIDE 10 MG/1
10 TABLET ORAL DAILY
Qty: 90 TABLET | Refills: 1 | Status: SHIPPED | OUTPATIENT
Start: 2022-12-27

## 2022-12-27 RX ORDER — MONTELUKAST SODIUM 10 MG/1
10 TABLET ORAL NIGHTLY
Qty: 90 TABLET | Refills: 1 | Status: SHIPPED | OUTPATIENT
Start: 2022-12-27 | End: 2022-12-27

## 2022-12-27 RX ORDER — MONTELUKAST SODIUM 10 MG/1
10 TABLET ORAL NIGHTLY
Qty: 90 TABLET | Refills: 1 | Status: SHIPPED | OUTPATIENT
Start: 2022-12-27

## 2022-12-27 RX ORDER — HYDROCHLOROTHIAZIDE 25 MG/1
25 TABLET ORAL DAILY
Qty: 90 TABLET | Refills: 1 | Status: SHIPPED | OUTPATIENT
Start: 2022-12-27 | End: 2022-12-27

## 2022-12-27 RX ORDER — DESVENLAFAXINE 100 MG/1
100 TABLET, EXTENDED RELEASE ORAL DAILY
Qty: 90 TABLET | Refills: 1 | Status: SHIPPED | OUTPATIENT
Start: 2022-12-27 | End: 2022-12-27

## 2022-12-27 RX ORDER — ROPINIROLE 0.25 MG/1
0.25 TABLET, FILM COATED ORAL NIGHTLY
Qty: 90 TABLET | Refills: 1 | Status: SHIPPED | OUTPATIENT
Start: 2022-12-27 | End: 2022-12-27

## 2022-12-27 RX ORDER — LEVOTHYROXINE SODIUM 0.2 MG/1
200 TABLET ORAL DAILY
Qty: 90 TABLET | Refills: 1 | Status: SHIPPED | OUTPATIENT
Start: 2022-12-27

## 2022-12-27 RX ORDER — DESVENLAFAXINE 100 MG/1
100 TABLET, EXTENDED RELEASE ORAL DAILY
Qty: 90 TABLET | Refills: 1 | Status: SHIPPED | OUTPATIENT
Start: 2022-12-27 | End: 2023-06-25

## 2022-12-27 RX ORDER — ROPINIROLE 0.25 MG/1
0.25 TABLET, FILM COATED ORAL NIGHTLY
Qty: 90 TABLET | Refills: 1 | Status: SHIPPED | OUTPATIENT
Start: 2022-12-27

## 2022-12-27 RX ORDER — BUSPIRONE HYDROCHLORIDE 30 MG/1
30 TABLET ORAL 2 TIMES DAILY
Qty: 180 TABLET | Refills: 1 | Status: SHIPPED | OUTPATIENT
Start: 2022-12-27 | End: 2022-12-27

## 2022-12-27 RX ORDER — LEVOTHYROXINE SODIUM 0.2 MG/1
200 TABLET ORAL DAILY
Qty: 90 TABLET | Refills: 1 | Status: SHIPPED | OUTPATIENT
Start: 2022-12-27 | End: 2022-12-27

## 2022-12-27 RX ORDER — FUROSEMIDE 20 MG/1
20 TABLET ORAL DAILY
Qty: 90 TABLET | Refills: 1 | Status: SHIPPED | OUTPATIENT
Start: 2022-12-27

## 2022-12-27 RX ORDER — CETIRIZINE HYDROCHLORIDE 10 MG/1
10 TABLET ORAL DAILY
Qty: 90 TABLET | Refills: 1 | Status: SHIPPED | OUTPATIENT
Start: 2022-12-27 | End: 2022-12-27

## 2022-12-27 RX ORDER — BUSPIRONE HYDROCHLORIDE 30 MG/1
30 TABLET ORAL 2 TIMES DAILY
Qty: 180 TABLET | Refills: 1 | Status: SHIPPED | OUTPATIENT
Start: 2022-12-27 | End: 2023-03-27

## 2022-12-27 RX ORDER — POTASSIUM CHLORIDE 750 MG/1
10 TABLET, FILM COATED, EXTENDED RELEASE ORAL 2 TIMES DAILY
Qty: 180 TABLET | Refills: 1 | Status: SHIPPED | OUTPATIENT
Start: 2022-12-27 | End: 2022-12-27

## 2022-12-27 NOTE — PROGRESS NOTES
Yulissa Spaulding presents to [facility].    Chief Complaint: WWE     Subjective       History of Present Illness:  HPI  Mrs. Spaulding presents with well Woman Exam.  Her last physical exam was 3 year ago.  She is status-post hysterectomy.  She is not currently using any form of contraception.  She does not perform breast self-exams.       Her last mammogram was 1 year ago.   Her last DEXA was 1 year ago.   Preventative Health updated today.  She is current with her influenza.  She is not current with Td immunization.  Mrs. Spaulding denies any history of abnormal Pap smears.  Tobacco: She has never smoked.  she still has her cervix     Essential hypercholesterolemia: current treatment includes a low cholesterol/low fat diet and HAD TO QUIT THE STATIN DUE TO SE'S.  Compliance with treatment has been fair; she does not follow a diet and exercise regimen.  She denies experiencing any hypercholesterolemia related symptoms.        History of acquired hypothyroidism,  this was first diagnosed >10 years ago.  She is currently taking Levothyroid, 200 mcg daily.  She cannot remember when a TSH was last checked.  The result was reported as normal.  She denies any related symptoms.  She reports no symptoms suggestive of adverse medication effect.          Hypertension: she is not using any nonpharmacologic treatment modalities.  Her current cardiac medication regimen includes a diuretic ( HCTZ ), CCB (diltiazem), .  Mrs. Spaulding does not check her blood pressure other than at her clinic appointments.  She is tolerating the medication well without side effects.  Compliance with treatment has been good; she takes her medication as directed.        Atrial fibrillation is stable on diltiazem, amiodorone, and eliquis    Chronic depression, stable on busapr and pristiq    Chronic RLS, she is on requip 0.25 mg      Review of Systems:  Review of Systems   Constitutional: Negative for chills, fatigue  and fever.   HENT: Negative for ear pain, sinus pressure and sore throat.    Eyes: Negative for blurred vision and double vision.   Respiratory: Negative for cough, shortness of breath and wheezing.    Cardiovascular: Negative for chest pain and palpitations.   Gastrointestinal: Negative for abdominal pain, blood in stool, constipation, diarrhea, nausea and vomiting.   Skin: Negative for rash.   Neurological: Negative for dizziness and headache.   Psychiatric/Behavioral: Negative for depressed mood.        Objective   Medical History:  Past Medical History:   • Anxiety   • Atrial fibrillation (HCC)   • Chronic pain disorder   • Depression   • Disease of thyroid gland   • Obsessive-compulsive disorder   • Panic disorder   • Psychiatric illness   • Self-injurious behavior    patient says she picks at her skin when nervous     Past Surgical History:   • BREAST SURGERY    tumor removed   • CARDIAC SURGERY    installed LOOP recorder   • CHOLECYSTECTOMY   • GASTRIC SLEEVE LAPAROSCOPIC   • HERNIA REPAIR   • HYSTERECTOMY   • REPLACEMENT TOTAL KNEE      Family History   Problem Relation Age of Onset   • Anxiety disorder Mother    • Dementia Mother    • Depression Mother    • Anxiety disorder Sister    • Depression Sister      Social History     Tobacco Use   • Smoking status: Never   • Smokeless tobacco: Never   Substance Use Topics   • Alcohol use: Yes     Comment: social        Health Maintenance Due   Topic Date Due   • DXA SCAN  06/30/2022        Immunization History   Administered Date(s) Administered   • COVID-19 (MODERNA) 1st, 2nd, 3rd Dose Only 03/22/2021, 04/19/2021, 01/28/2022   • COVID-19 (PFIZER) BIVALENT BOOSTER 12+YRS 12/27/2022   • FluLaval/Fluzone >6mos 10/13/2022   • Fluzone High Dose =>65 Years (Vaxcare ONLY) 10/13/2020, 08/25/2021   • Fluzone High-Dose 65+yrs 08/25/2021   • Pneumococcal Conjugate 13-Valent (PCV13) 01/14/2019   • Pneumococcal Polysaccharide (PPSV23) 06/22/2021       Allergies   Allergen  "Reactions   • Atorvastatin Myalgia     Joint pain   • Methylmethacrylate Swelling and Other (See Comments)     (bone cement)     • Adhesive Tape Rash     Off-brand bandaids          Medications:  Current Outpatient Medications on File Prior to Visit   Medication Sig   • Acetaminophen (Tylenol) 325 MG capsule Take 1,000 mg by mouth As Needed.   • amiodarone (PACERONE) 400 MG tablet Take 400 mg by mouth Daily.   • apixaban (ELIQUIS) 5 MG tablet tablet Take 5 mg by mouth 2 (Two) Times a Day.   • dilTIAZem CD (Cartia XT) 240 MG 24 hr capsule Take 1 capsule by mouth Daily.   • MELATONIN PO Take 1 tablet by mouth Daily.   • [DISCONTINUED] busPIRone (BUSPAR) 30 MG tablet Take 1 tablet by mouth 2 (Two) Times a Day   • [DISCONTINUED] cetirizine (zyrTEC) 10 MG tablet Zyrtec 10 mg oral tablet take 1 tablet (10 mg) by oral route once daily   Active   • [DISCONTINUED] desvenlafaxine (Pristiq) 100 MG 24 hr tablet Take 1 tablet by mouth Daily for 180 days. Indications: Major Depressive Disorder   • [DISCONTINUED] furosemide (Lasix) 20 MG tablet Take 1 tablet by mouth Daily.   • [DISCONTINUED] hydroCHLOROthiazide (HYDRODIURIL) 25 MG tablet Take 1 tablet by mouth Daily.   • [DISCONTINUED] levothyroxine (Synthroid) 200 MCG tablet Take 1 tablet by mouth Daily.   • [DISCONTINUED] montelukast (SINGULAIR) 10 MG tablet Take 1 tablet by mouth Every Night.   • [DISCONTINUED] potassium chloride 10 MEQ CR tablet Take 1 tablet by mouth 2 (Two) Times a Day.   • [DISCONTINUED] rOPINIRole (REQUIP) 0.25 MG tablet Take 1 tablet by mouth 1 hour before bedtime.   • [DISCONTINUED] vitamin D3 (vitamin d) 125 MCG (5000 UT) capsule capsule Take 1 capsule by mouth Daily.     No current facility-administered medications on file prior to visit.       Vital Signs:   /71 (BP Location: Left arm, Patient Position: Sitting, Cuff Size: Large Adult)   Pulse 59   Ht 176.5 cm (69.5\")   Wt 120 kg (265 lb 9.6 oz)   SpO2 92% Comment: Room air  BMI 38.66 " kg/m²       Physical Exam:  Physical Exam  Exam conducted with a chaperone present.   Constitutional:       General: She is not in acute distress.     Appearance: She is obese. She is not ill-appearing.   HENT:      Head: Normocephalic.      Right Ear: Tympanic membrane normal. There is no impacted cerumen.      Left Ear: Tympanic membrane normal. There is no impacted cerumen.      Mouth/Throat:      Mouth: Mucous membranes are moist.      Pharynx: Oropharynx is clear.   Eyes:      Extraocular Movements: Extraocular movements intact.      Pupils: Pupils are equal, round, and reactive to light.   Neck:      Vascular: No carotid bruit.   Cardiovascular:      Rate and Rhythm: Normal rate and regular rhythm.      Pulses: Normal pulses.      Heart sounds: No murmur heard.  Pulmonary:      Effort: Pulmonary effort is normal.      Breath sounds: No wheezing, rhonchi or rales.   Chest:   Breasts:     William Score is 5.      Right: Normal.      Left: Normal.   Abdominal:      General: Bowel sounds are normal.      Palpations: Abdomen is soft.      Tenderness: There is no abdominal tenderness.   Genitourinary:     General: Normal vulva.      Vagina: Normal.      Uterus: Absent.       Adnexa: Right adnexa normal and left adnexa normal.      Rectum: Normal.   Musculoskeletal:         General: No swelling. Normal range of motion.      Cervical back: No rigidity or tenderness.   Lymphadenopathy:      Cervical: No cervical adenopathy.      Upper Body:      Right upper body: No axillary adenopathy.      Left upper body: No axillary adenopathy.   Skin:     General: Skin is warm and dry.   Neurological:      Mental Status: She is alert.      Gait: Gait normal.   Psychiatric:         Mood and Affect: Mood normal.         Behavior: Behavior normal.         Judgment: Judgment normal.         Result Review      The following data was reviewed by Rayne Gee MD on 12/27/2022.  Lab Results   Component Value Date    WBC 8.08  12/27/2022    HGB 14.0 12/27/2022    HCT 43.6 12/27/2022    MCV 94.4 12/27/2022     12/27/2022     Lab Results   Component Value Date    GLUCOSE 113 (H) 03/01/2022    BUN 15 03/01/2022    CREATININE 0.84 03/01/2022    EGFRIFNONA 86 06/22/2021    BCR 17.9 03/01/2022    K 4.6 03/01/2022    CO2 30.3 (H) 03/01/2022    CALCIUM 9.3 03/01/2022    ALBUMIN 4.40 03/01/2022    LABIL2 1.5 10/02/2019    AST 17 03/01/2022    ALT 18 03/01/2022     Lab Results   Component Value Date    CHOL 197 03/01/2022    CHLPL 176 10/14/2020    TRIG 131 03/01/2022    HDL 64 (H) 03/01/2022     (H) 03/01/2022     Lab Results   Component Value Date    TSH 0.727 03/01/2022     Lab Results   Component Value Date    HGBA1C 5.4 08/27/2019     No results found for: PSA                    Assessment and Plan:          Diagnoses and all orders for this visit:    1. Well woman exam (Primary)  Comments:  no pap performed, breast exam within normal limits.  Recommend low carbohydrate diet, weight loss and exercise.  Silver sneakers may be a good option for her  Orders:  -     POCT urinalysis dipstick, automated    2. Mood disorder (HCC)  Comments:  Overall stable and doing well with current medication.  No changes needed in current meds or treatment plan    3. Longstanding persistent atrial fibrillation (HCC)  Comments:  Normal sinus rhythm today.  Continue current medications and follow-up with cardiology as directed  Orders:  -     Discontinue: hydroCHLOROthiazide (HYDRODIURIL) 25 MG tablet; Take 1 tablet by mouth Daily.  Dispense: 90 tablet; Refill: 1  -     hydroCHLOROthiazide (HYDRODIURIL) 25 MG tablet; Take 1 tablet by mouth Daily.  Dispense: 90 tablet; Refill: 1    4. Essential hypertension  Comments:  Borderline.  Continue current meds and she is to check her blood pressures at home and call if greater than 140/90  Orders:  -     Comprehensive Metabolic Panel; Future  -     CBC (No Diff); Future  -     Discontinue: levothyroxine  (Synthroid) 200 MCG tablet; Take 1 tablet by mouth Daily.  Dispense: 90 tablet; Refill: 1  -     levothyroxine (Synthroid) 200 MCG tablet; Take 1 tablet by mouth Daily.  Dispense: 90 tablet; Refill: 1    5. Mixed hyperlipidemia  Comments:  Diet controlled, will repeat labs today  Orders:  -     Lipid Panel; Future    6. Seasonal allergic rhinitis, unspecified trigger  Comments:  Stable with current medications.  No changes needed in current treatment plan.  Singulair refilled  Orders:  -     Discontinue: montelukast (SINGULAIR) 10 MG tablet; Take 1 tablet by mouth Every Night.  Dispense: 90 tablet; Refill: 1  -     montelukast (SINGULAIR) 10 MG tablet; Take 1 tablet by mouth Every Night.  Dispense: 90 tablet; Refill: 1    7. Acquired hypothyroidism  -     TSH+Free T4; Future  -     Discontinue: cetirizine (zyrTEC) 10 MG tablet; Take 1 tablet by mouth Daily.  Dispense: 90 tablet; Refill: 1  -     cetirizine (zyrTEC) 10 MG tablet; Take 1 tablet by mouth Daily.  Dispense: 90 tablet; Refill: 1    8. Anxiety  Comments:  She is overall stable and well-controlled.  No changes needed in current medication treatment plan  Orders:  -     Discontinue: busPIRone (BUSPAR) 30 MG tablet; Take 1 tablet by mouth 2 (Two) Times a Day  Dispense: 180 tablet; Refill: 1  -     Discontinue: desvenlafaxine (Pristiq) 100 MG 24 hr tablet; Take 1 tablet by mouth Daily for 180 days. Indications: Major Depressive Disorder  Dispense: 90 tablet; Refill: 1  -     busPIRone (BUSPAR) 30 MG tablet; Take 1 tablet by mouth 2 (Two) Times a Day  Dispense: 180 tablet; Refill: 1  -     desvenlafaxine (Pristiq) 100 MG 24 hr tablet; Take 1 tablet by mouth Daily for 180 days. Indications: Major Depressive Disorder  Dispense: 90 tablet; Refill: 1    9. Restless leg syndrome  Comments:  Very well controlled with Mirapex.  Medication refilled    10. Primary insomnia  Comments:  on melatonin 7 mg and still has nights she doesnt sleep    11. Vitamin D  deficiency  Comments:  Continue vitamin D supplementation.  We will check vitamin D labs today  Orders:  -     Vitamin D 25 hydroxy; Future  -     Discontinue: vitamin D3 (vitamin d) 125 MCG (5000 UT) capsule capsule; Take 1 capsule by mouth Daily.  Dispense: 90 capsule; Refill: 1  -     vitamin D3 (vitamin d) 125 MCG (5000 UT) capsule capsule; Take 1 capsule by mouth Daily.  Dispense: 90 capsule; Refill: 1    12. Postmenopausal state  -     DEXA Bone Density Axial; Future    13. Female stress incontinence  Comments:  Recommend Kegel exercises    14. Overweight  Comments:  she has lost 18#s on her own, defers dietician referral, she is eating healthy, unable to exercise due to her L knee pain s/p TKR    15. Encounter for screening mammogram for breast cancer  Comments:  Mammogram ordered  Orders:  -     Cancel: Mammo Screening Digital Tomosynthesis Bilateral With CAD; Future  -     Mammo Screening Digital Tomosynthesis Bilateral With CAD; Future    16. Encounter for immunization  -     COVID-19 Bivalent Booster (Pfizer) 12+yrs    17. Colon cancer screening  -     POC Occult Blood Stool    18. Vitamin D deficiency  Comments:  in vitamin D 5000 units daily  Orders:  -     Vitamin D 25 hydroxy; Future  -     Discontinue: vitamin D3 (vitamin d) 125 MCG (5000 UT) capsule capsule; Take 1 capsule by mouth Daily.  Dispense: 90 capsule; Refill: 1  -     vitamin D3 (vitamin d) 125 MCG (5000 UT) capsule capsule; Take 1 capsule by mouth Daily.  Dispense: 90 capsule; Refill: 1    Other orders  -     Discontinue: furosemide (Lasix) 20 MG tablet; Take 1 tablet by mouth Daily.  Dispense: 90 tablet; Refill: 1  -     Discontinue: potassium chloride 10 MEQ CR tablet; Take 1 tablet by mouth 2 (Two) Times a Day.  Dispense: 180 tablet; Refill: 1  -     Discontinue: rOPINIRole (REQUIP) 0.25 MG tablet; Take 1 tablet by mouth 1 hour before bedtime.  Dispense: 90 tablet; Refill: 1  -     furosemide (Lasix) 20 MG tablet; Take 1 tablet by  mouth Daily.  Dispense: 90 tablet; Refill: 1  -     potassium chloride 10 MEQ CR tablet; Take 1 tablet by mouth 2 (Two) Times a Day.  Dispense: 180 tablet; Refill: 1  -     rOPINIRole (REQUIP) 0.25 MG tablet; Take 1 tablet by mouth 1 hour before bedtime.  Dispense: 90 tablet; Refill: 1          Follow Up   Return in about 6 months (around 6/27/2023) for Medicare Wellness.

## 2023-04-10 ENCOUNTER — HOSPITAL ENCOUNTER (OUTPATIENT)
Dept: BONE DENSITY | Facility: HOSPITAL | Age: 68
Discharge: HOME | End: 2023-04-10
Payer: MEDICARE

## 2023-04-10 ENCOUNTER — HOSPITAL ENCOUNTER (OUTPATIENT)
Dept: MAMMOGRAPHY | Facility: HOSPITAL | Age: 68
Discharge: HOME | End: 2023-04-10
Payer: MEDICARE

## 2023-04-10 DIAGNOSIS — Z12.31 ENCOUNTER FOR SCREENING MAMMOGRAM FOR BREAST CANCER: ICD-10-CM

## 2023-04-10 DIAGNOSIS — Z78.0 POSTMENOPAUSAL STATE: ICD-10-CM

## 2023-04-10 PROCEDURE — 77080 DXA BONE DENSITY AXIAL: CPT

## 2023-04-10 PROCEDURE — 77067 SCR MAMMO BI INCL CAD: CPT

## 2023-04-10 PROCEDURE — 77063 BREAST TOMOSYNTHESIS BI: CPT

## 2023-09-14 ENCOUNTER — TELEPHONE (OUTPATIENT)
Dept: FAMILY MEDICINE CLINIC | Age: 68
End: 2023-09-14
Payer: OTHER GOVERNMENT

## 2023-09-14 NOTE — TELEPHONE ENCOUNTER
Caller: Yulissa Spaulding    Relationship: Self    Best call back number: [phone]     Requested Prescriptions:   ALL ACTIVE MEDICATIONS-90 DAY SUPPLY    Pharmacy where request should be sent: [facility] - [address] - [phone]  - [phone] FX     Last office visit with prescribing clinician: 6/26/2023   Last telemedicine visit with prescribing clinician: Visit date not found   Next office visit with prescribing clinician: 1/22/2024     Additional details provided by patient: PATIENT STATED THAT  TOLD THE PATIENT THAT SHE WILL NOT HAVE ENOUGH OF HER MEDICATIONS TO LAST UNTIL 1.22.24, THAT IS WHEN HER NEXT APPOINTMENT IS. PATIENT STATED THAT SHE ORDERED HER LAST REFILLS 2 WEEKS AGO FROM .    Does the patient have less than a 3 day supply:  [x] Yes  [] No    Would you like a call back once the refill request has been completed: [] Yes [x] No    If the office needs to give you a call back, can they leave a voicemail: [] Yes [x] No    Rosi Todd Rep   09/14/23 08:17 EDT

## 2023-10-16 ENCOUNTER — TELEPHONE (OUTPATIENT)
Dept: FAMILY MEDICINE CLINIC | Age: 68
End: 2023-10-16

## 2023-10-16 NOTE — TELEPHONE ENCOUNTER
Caller: Yulissa Spaulding    Relationship: Self    Best call back number: [phone]    What is the medical concern/diagnosis: COLONOSCOPY, RECTAL BLEEDING     What specialty or service is being requested: GASTROENTEROLOGY     What is the provider, practice or medical service name: [facility]     What is the office location: [address] [place] [address]     What is the office phone number: [phone]    Any additional details: PATIENT STATES MD KATZ WAS SUPPOSED TO BE REFERRING HER AT HER LAST APPOINTMENT BUT HAD NEVER HEARD ANYTHING FROM [facility] ABOUT GETTING SCHEDULED. PATIENT ALSO ASKS THAT THE MEDICATION THAT NEEDS TO BE PRESCRIBED FOR THE COLONOSCOPY COULD BE PRESCRIBED AS A PILL INSTEAD OF THE LIQUID. PATIENT STATES SHE VOMITS EVERY TIME SHE TRIES TO SWALLOW THE LIQUID.

## 2023-10-16 NOTE — TELEPHONE ENCOUNTER
"Please inform Yulissa I did place the order but Dr. Gasca's office denied the order because per scheduling \"Patient's last colonoscopy was completed on 12/01/2017 and per Dr. Gasca's recommendations the patient was placed on a 10 year recall and is not due until 12/01/2027 for another colonoscopy. \"  I was not aware of any rectal bleeding.  Find out what is going on with rectal bleeding because that will change the type of colonoscopy I ordered and it will be diagnostic and not screening.  Dr. Gee  "

## 2023-10-17 DIAGNOSIS — K62.5 RECTAL BLEEDING: Primary | ICD-10-CM

## 2023-10-17 NOTE — TELEPHONE ENCOUNTER
Pt inf she has been having a tiny bit of bleeding for 6 months and it is not all the time. She said that last week she bleed thru her pants. No bleeding today yesterday just a dime worth mostly after a BM. She wants to see Sweetie Emanuel with DR Kessler's office Faithclarice rendon

## 2023-10-23 ENCOUNTER — OFFICE VISIT (OUTPATIENT)
Dept: GASTROENTEROLOGY | Facility: CLINIC | Age: 68
End: 2023-10-23
Payer: MEDICARE

## 2023-10-23 VITALS
DIASTOLIC BLOOD PRESSURE: 60 MMHG | WEIGHT: 293 LBS | HEART RATE: 69 BPM | HEIGHT: 70 IN | BODY MASS INDEX: 41.95 KG/M2 | SYSTOLIC BLOOD PRESSURE: 140 MMHG

## 2023-10-23 DIAGNOSIS — K92.1 MELENA: ICD-10-CM

## 2023-10-23 DIAGNOSIS — K62.5 RECTAL BLEEDING: Primary | ICD-10-CM

## 2023-10-23 DIAGNOSIS — Z87.19 HISTORY OF CIRRHOSIS: ICD-10-CM

## 2023-10-23 DIAGNOSIS — Z80.0 FH: COLON CANCER: ICD-10-CM

## 2023-10-23 DIAGNOSIS — K62.89 ANAL OR RECTAL PAIN: ICD-10-CM

## 2023-10-23 DIAGNOSIS — Z86.010 HISTORY OF COLON POLYPS: ICD-10-CM

## 2023-10-23 PROBLEM — Z86.0100 HISTORY OF COLON POLYPS: Status: ACTIVE | Noted: 2023-10-23

## 2023-10-23 PROCEDURE — 1160F RVW MEDS BY RX/DR IN RCRD: CPT | Performed by: NURSE PRACTITIONER

## 2023-10-23 PROCEDURE — 3078F DIAST BP <80 MM HG: CPT | Performed by: NURSE PRACTITIONER

## 2023-10-23 PROCEDURE — 1159F MED LIST DOCD IN RCRD: CPT | Performed by: NURSE PRACTITIONER

## 2023-10-23 PROCEDURE — 3077F SYST BP >= 140 MM HG: CPT | Performed by: NURSE PRACTITIONER

## 2023-10-23 PROCEDURE — 99204 OFFICE O/P NEW MOD 45 MIN: CPT | Performed by: NURSE PRACTITIONER

## 2023-10-23 RX ORDER — SOD SULF/POT CHLORIDE/MAG SULF 1.479 G
12 TABLET ORAL TAKE AS DIRECTED
Qty: 24 TABLET | Refills: 0 | Status: SHIPPED | OUTPATIENT
Start: 2023-10-23

## 2023-10-23 RX ORDER — DILTIAZEM HYDROCHLORIDE 180 MG/1
180 CAPSULE, EXTENDED RELEASE ORAL DAILY
COMMUNITY
Start: 2023-09-14 | End: 2024-09-13

## 2023-10-23 NOTE — PROGRESS NOTES
Chief Complaint        Rectal Bleeding, Rectal Leakage, and Diarrhea    History of Present Illness      Yulissa Spaulding is a 67 y.o. female who presents to [facility] as a new patient For blood in stool. She was last seen in the office by nurse practitioner Faith Bah in February 2019.      She admits she started having blood stained on the back of her pants about 6-8 months ago. Stools were black. Then she admits for the past 3 months she has been having rectal pain and bleeding with lifting. Sometimes she will burn in her rectum with black stool.     She has history of gastric sleeve and cholecystectomy. She has hx Afib and is on ELIQUIS. She denies any dysphagia or reflux.     She has a history of cirrhosis and underwent FibroScan in February 2019 that showed liver stiffness consistent with F4 cirrhosis.  History of chronic hepatitis C.Most recent blood work does show normal liver enzymes as of this past June. Had initially tested positive for chronic Hep C at PCP office. Saw Lillian in 2019 at [facility] care clinic and had labs done that showed HEP C not detected. Patient was lost to follow up after that. Denies hx fatty disease.     EGD/colonoscopy---Had last colonoscopy in 12/2017 and has hx HP polyps. Believes she had EGD many years ago when she had her gastric surgery.     GI family history----Maternal aunt with colon cancer. Daughter with colon cancer.     Results       Result Review :   The following data was reviewed by: RAVIN Mera on 10/23/2023     CMP          12/27/2022    10:07 6/26/2023    10:52   CMP   Glucose 104  107    BUN 20  18    Creatinine 0.86  0.96    EGFR 73.2  64.2    Sodium 139  141    Potassium 4.1  4.5    Chloride 100  102    Calcium 9.1  9.7    Total Protein 6.6  7.1    Albumin 4.5  4.8    Globulin 2.1  2.3    Total Bilirubin 0.3  0.4    Alkaline Phosphatase 78  83    AST (SGOT) 22  26    ALT (SGPT) 20  27    Albumin/Globulin  "Ratio 2.1  2.1    BUN/Creatinine Ratio 23.3  18.8    Anion Gap 8.6  10.0      CBC          12/27/2022    10:07   CBC   WBC 8.08    RBC 4.62    Hemoglobin 14.0    Hematocrit 43.6    MCV 94.4    MCH 30.3    MCHC 32.1    RDW 13.6    Platelets 270      Lipid Panel          12/27/2022    10:07 6/26/2023    10:52   Lipid Panel   Total Cholesterol 202  227    Triglycerides 83  112    HDL Cholesterol 67  68    VLDL Cholesterol 15  20    LDL Cholesterol  120  139    LDL/HDL Ratio 1.77  2.01      TSH          12/27/2022    10:07 6/26/2023    10:52   TSH   TSH 1.580  3.730        Lipase No results found for: \"LIPASE\"  Amylase No results found for: \"AMYLASE\"         Past Medical History       Past Medical History:   Diagnosis Date    Anxiety     Atrial fibrillation     Chronic pain disorder     Depression     Disease of thyroid gland     Obsessive-compulsive disorder     Panic disorder     Psychiatric illness     Self-injurious behavior     patient says she picks at her skin when nervous     Past Surgical History:   Procedure Laterality Date    BREAST SURGERY Left     tumor removed    CARDIAC SURGERY      installed LOOP recorder    CHOLECYSTECTOMY      GASTRIC SLEEVE LAPAROSCOPIC      HERNIA REPAIR      HYSTERECTOMY      REPLACEMENT TOTAL KNEE Left          Current Outpatient Medications:     dilTIAZem (TIAZAC) 180 MG 24 hr capsule, Take 1 capsule by mouth Daily., Disp: , Rfl:     Acetaminophen (Tylenol) 325 MG capsule, Take 1,000 mg by mouth As Needed., Disp: , Rfl:     amiodarone (PACERONE) 400 MG tablet, Take 1 tablet by mouth Daily., Disp: , Rfl:     apixaban (ELIQUIS) 5 MG tablet tablet, Take 1 tablet by mouth 2 (Two) Times a Day., Disp: , Rfl:     busPIRone (BUSPAR) 30 MG tablet, Take 1 tablet by mouth 2 (Two) Times a Day, Disp: 180 tablet, Rfl: 1    cetirizine (zyrTEC) 10 MG tablet, Take 1 tablet by mouth Daily., Disp: 90 tablet, Rfl: 1    desvenlafaxine (Pristiq) 100 MG 24 hr tablet, Take 1 tablet by mouth Daily for 180 " days. Indications: Major Depressive Disorder, Disp: 90 tablet, Rfl: 1    dilTIAZem CD (Cartia XT) 240 MG 24 hr capsule, Take 1 capsule by mouth Daily., Disp: 90 capsule, Rfl: 1    furosemide (Lasix) 20 MG tablet, Take 1 tablet by mouth Daily., Disp: 90 tablet, Rfl: 1    hydroCHLOROthiazide (HYDRODIURIL) 25 MG tablet, Take 1 tablet by mouth Daily., Disp: 90 tablet, Rfl: 1    ketorolac (ACULAR) 0.4 % solution, Administer 1 drop into the left eye 4 (Four) Times a Day., Disp: 5 mL, Rfl: 1    ketorolac (ACULAR) 0.5 % ophthalmic solution, Instill 1 drop in left eye twice daily., Disp: 10 mL, Rfl: 3    levothyroxine (Synthroid) 200 MCG tablet, Take 1 tablet by mouth Daily., Disp: 90 tablet, Rfl: 1    MELATONIN PO, Take 1 tablet by mouth Daily., Disp: , Rfl:     Menthol (Biofreeze) 5 % patch, Apply 1 patch topically Every Night., Disp: 90 patch, Rfl: 1    montelukast (SINGULAIR) 10 MG tablet, Take 1 tablet by mouth Every Night., Disp: 90 tablet, Rfl: 1    potassium chloride 10 MEQ CR tablet, Take 1 tablet by mouth 2 (Two) Times a Day., Disp: 180 tablet, Rfl: 1    pramipexole (Mirapex) 0.25 MG tablet, Take 1 tablet by mouth Every Night., Disp: 90 tablet, Rfl: 1    prednisoLONE acetate (PRED FORTE) 1 % ophthalmic suspension, Instill 1 drop in left eye four times daily., Disp: 10 mL, Rfl: 3    Sodium Sulfate-Mag Sulfate-KCl (Sutab) 3435-558-719 MG tablet, Take 12 tablets by mouth at 6 pm and 12 tablets 4 hours prior to procedure., Disp: 24 tablet, Rfl: 0    vitamin D3 (vitamin d) 125 MCG (5000 UT) capsule capsule, Take 1 capsule by mouth Daily., Disp: 90 capsule, Rfl: 1     Allergies   Allergen Reactions    Atorvastatin Myalgia     Joint pain    Methylmethacrylate Swelling and Other (See Comments)     (bone cement)      Adhesive Tape Rash     Off-brand bandaids         Family History   Problem Relation Age of Onset    Anxiety disorder Mother     Dementia Mother     Depression Mother     Anxiety disorder Sister     Depression  "Sister     Colon cancer Maternal Aunt     Colon cancer Daughter         Social History     Social History Narrative    Not on file       Objective       Objective     Vital Signs:   /60 (BP Location: Left arm, Patient Position: Sitting, Cuff Size: Adult)   Pulse 69   Ht 177.8 cm (70\")   Wt 134 kg (294 lb 6.4 oz)   BMI 42.24 kg/m²     Body mass index is 42.24 kg/m².    Review of Systems   Constitutional:  Negative for appetite change, chills, diaphoresis, fatigue, fever and unexpected weight change.   HENT:  Negative for nosebleeds, postnasal drip, sore throat, trouble swallowing and voice change.    Respiratory:  Negative for cough, choking, chest tightness, shortness of breath, wheezing and stridor.    Cardiovascular:  Negative for chest pain, palpitations and leg swelling.   Gastrointestinal:  Positive for anal bleeding, blood in stool, diarrhea and rectal pain. Negative for abdominal distention, abdominal pain, constipation, nausea and vomiting.   Endocrine: Negative for polydipsia, polyphagia and polyuria.   Musculoskeletal:  Negative for gait problem.   Skin:  Negative for rash and wound.   Allergic/Immunologic: Negative for food allergies.   Neurological:  Negative for dizziness, speech difficulty and light-headedness.   Psychiatric/Behavioral:  Negative for confusion, self-injury, sleep disturbance and suicidal ideas.         Physical Exam  Constitutional:       General: She is not in acute distress.     Appearance: She is well-developed. She is not ill-appearing.   HENT:      Head: Normocephalic.   Eyes:      Pupils: Pupils are equal, round, and reactive to light.   Cardiovascular:      Rate and Rhythm: Normal rate and regular rhythm.      Heart sounds: Normal heart sounds.   Pulmonary:      Effort: Pulmonary effort is normal.      Breath sounds: Normal breath sounds.   Abdominal:      General: Bowel sounds are normal. There is no distension.      Palpations: Abdomen is soft. There is no mass.      " Tenderness: There is no abdominal tenderness. There is no guarding or rebound.      Hernia: No hernia is present.   Musculoskeletal:         General: Normal range of motion.   Skin:     General: Skin is warm and dry.   Neurological:      Mental Status: She is alert and oriented to person, place, and time.   Psychiatric:         Speech: Speech normal.         Behavior: Behavior normal.         Judgment: Judgment normal.              Assessment & Plan          Assessment and Plan    Diagnoses and all orders for this visit:    1. Rectal bleeding (Primary)  -     Case Request; Standing  -     Follow Anesthesia Guidelines / Protocol; Future  -     Obtain Informed Consent; Future  -     Case Request  -     Sodium Sulfate-Mag Sulfate-KCl (Sutab) 8960-487-938 MG tablet; Take 12 tablets by mouth at 6 pm and 12 tablets 4 hours prior to procedure.  Dispense: 24 tablet; Refill: 0    2. Melena  -     Case Request; Standing  -     Follow Anesthesia Guidelines / Protocol; Future  -     Obtain Informed Consent; Future  -     Case Request  -     Sodium Sulfate-Mag Sulfate-KCl (Sutab) [id number] MG tablet; Take 12 tablets by mouth at 6 pm and 12 tablets 4 hours prior to procedure.  Dispense: 24 tablet; Refill: 0    3. History of colon polyps  -     Case Request; Standing  -     Follow Anesthesia Guidelines / Protocol; Future  -     Obtain Informed Consent; Future  -     Case Request  -     Sodium Sulfate-Mag Sulfate-KCl (Sutab) [id number] MG tablet; Take 12 tablets by mouth at 6 pm and 12 tablets 4 hours prior to procedure.  Dispense: 24 tablet; Refill: 0    4. FH: colon cancer  -     Case Request; Standing  -     Follow Anesthesia Guidelines / Protocol; Future  -     Obtain Informed Consent; Future  -     Case Request  -     Sodium Sulfate-Mag Sulfate-KCl (Sutab) [id number] MG tablet; Take 12 tablets by mouth at 6 pm and 12 tablets 4 hours prior to procedure.  Dispense: 24 tablet; Refill: 0    5. Anal or rectal pain  -      Case Request; Standing  -     Follow Anesthesia Guidelines / Protocol; Future  -     Obtain Informed Consent; Future  -     Case Request  -     Sodium Sulfate-Mag Sulfate-KCl (Sutab) [id number] MG tablet; Take 12 tablets by mouth at 6 pm and 12 tablets 4 hours prior to procedure.  Dispense: 24 tablet; Refill: 0    6. History of cirrhosis  -     Case Request; Standing  -     Follow Anesthesia Guidelines / Protocol; Future  -     Obtain Informed Consent; Future  -     Case Request  -     Sodium Sulfate-Mag Sulfate-KCl (Sutab) [id number] MG tablet; Take 12 tablets by mouth at 6 pm and 12 tablets 4 hours prior to procedure.  Dispense: 24 tablet; Refill: 0    Other orders  -     Verify NPO; Standing  -     Verify Bowel Prep Was Successful; Standing  -     Give Tap Water Enema If Bowel Prep Insufficient; Standing    Reviewed medical history with her today.  Given her history and current symptoms recommend EGD and colonoscopy with Dr. Gasca for further evaluation.  Patient is agreeable to the scope.  She will need cardiac clearance given her history of A-fib and is currently on Eliquis and amiodarone.  I am concerned about her history of possible cirrhosis given her FibroScan results back in 2019.  I explained to her that we will evaluate this further once we get her scopes taking care of and get these other more pressing issues handled.  Patient to call the office with any issues.  Patient to follow-up with me after her scopes.  Patient is agreeable to the plan.    Surgical Risk and Benefits discussed: Possible risks/complications, benefits, and alternatives to surgical or invasive procedure have been explained to patient and/or legal guardian; risks include bleeding, infection, and perforation. Patient has been evaluated and can tolerate anesthesia and/or sedation. Risks, benefits, and alternatives to anesthesia and sedation have been explained to patient and/or legal guardian.            Follow Up       Follow Up    Return for F/U AFTER PROCEDURE.  Patient was given instructions and counseling regarding her condition or for health maintenance advice. Please see specific information pulled into the AVS if appropriate.

## 2023-11-20 ENCOUNTER — TELEPHONE (OUTPATIENT)
Dept: GASTROENTEROLOGY | Facility: CLINIC | Age: 68
End: 2023-11-20
Payer: OTHER GOVERNMENT

## 2023-11-20 NOTE — TELEPHONE ENCOUNTER
Pt states she uses amoxicillin when her teeth is cleaned.   Pt sees Marco Antonio Biswas in [place].   Left a vm with Dr Lester nurse to determine which antibiotic is recommended.

## 2023-11-20 NOTE — TELEPHONE ENCOUNTER
Pt aware to hold eliquis 2 days prior to procedure.   Colon prep instructions reviewed.   Pt states she needs antibiotics due to having a knee replacement x3. Please advise.

## 2023-11-20 NOTE — TELEPHONE ENCOUNTER
Attempted to contact pt to confirm which antibiotic her surgeon uses or who her ortho surgeon is.

## 2023-11-20 NOTE — TELEPHONE ENCOUNTER
Hub staff attempted to follow warm transfer process and was unsuccessful     Caller: Yulissa Spaulding    Relationship: Self    Best call back number: [phone]     What is the best time to reach you: DON    Who are you requesting to speak with (clinical staff, provider,  specific staff member): SPECIFIC STAFF MEMBER    Do you know the name of the person who called: DON    What was the call regarding: COLON PREP    PATIENT RETURNED CALL; HUB UNABLE TO WARM TRANSFER. PLEASE CALL HER BACK [phone]; [facility] TO [facility].

## 2023-11-20 NOTE — TELEPHONE ENCOUNTER
----- Message from Rosi Nicholson sent at 10/26/2023  8:40 AM EDT -----  Regarding: RE: Clearance  Received clearance approval scanned into media   ----- Message -----  From: Jane Tobar RegSched Rep  Sent: 10/23/2023   9:14 AM EDT  To: Rosi Nicholson; #  Subject: Clearance                                        Patient scheduled for EGD and colon 12/11/2023    Sent cardiac and Eliquis clearance to Dr Guerrero on 10/23/2023

## 2023-11-27 NOTE — TELEPHONE ENCOUNTER
Called Dr Biswas's office. Was transferred to a prescription vm. Left a  requesting a return call.

## 2023-11-27 NOTE — TELEPHONE ENCOUNTER
Ortho called and left a vm stating pts usually take amoxicillin 500 mg 4 tabs 1 hour prior to procedure.

## 2023-11-28 RX ORDER — AMOXICILLIN 500 MG/1
2000 CAPSULE ORAL ONCE
Qty: 4 CAPSULE | Refills: 0 | Status: SHIPPED | OUTPATIENT
Start: 2023-11-28 | End: 2023-11-29

## 2023-11-28 NOTE — TELEPHONE ENCOUNTER
Hub staff attempted to follow warm transfer process and was unsuccessful     Caller: Yulissa Spaulding    Relationship to patient: Self    Best call back number: [phone]    Patient is needing: PATIENT RETURNED A CALL FROM [facility]. PLEASE RETURN HER CALL [phone]. [place] TO [facility].

## 2023-12-01 ENCOUNTER — TELEPHONE (OUTPATIENT)
Dept: FAMILY MEDICINE CLINIC | Age: 68
End: 2023-12-01
Payer: OTHER GOVERNMENT

## 2023-12-01 NOTE — TELEPHONE ENCOUNTER
Pt has a torn tendon in her foot and she needs surgical clearance they are waiting to do surgery ASAP they want her pcp to do the clearance not only cardiology . PCP is out of the office until 12-11-23 is anyone willing to do this and if so when . She has had this injury for 3 weeks

## 2023-12-02 NOTE — TELEPHONE ENCOUNTER
I would be willing to see her in a 15-minute slot this week to evaluate.  However, given her history, she will also have to see cardiology for cardiac clearance.  I cannot do that for her.  Thanks.

## 2023-12-04 NOTE — TELEPHONE ENCOUNTER
I placed her on the schedule for Thursday to see you DR Jorgensen but also instructed her to contact cardiology for clearance from them

## 2023-12-05 NOTE — PRE-PROCEDURE INSTRUCTIONS
"Instructed on date and arrival time of 0900. Come to entrance \"C\". Must have  over age 18 to drive home.  May have two visitors; however, children under 12 must stay in waiting room.  Discussed clear liquid diet (no red or purple), bowel prep, and NPO.  May take medications as usual except for blood thinners, diabetic medications, and weight loss medications.  Bring list of medications.  Verbalized understanding of instructions given.  Instructed to call for questions or concerns.  Hold Eliquis for 2 days prior to procedure.  Cardiac and blood thinner clearances noted in chart.  "

## 2023-12-07 ENCOUNTER — OFFICE VISIT (OUTPATIENT)
Dept: FAMILY MEDICINE CLINIC | Age: 68
End: 2023-12-07
Payer: MEDICARE

## 2023-12-07 VITALS
BODY MASS INDEX: 41.95 KG/M2 | HEART RATE: 58 BPM | SYSTOLIC BLOOD PRESSURE: 121 MMHG | WEIGHT: 293 LBS | TEMPERATURE: 98 F | DIASTOLIC BLOOD PRESSURE: 61 MMHG | HEIGHT: 70 IN

## 2023-12-07 DIAGNOSIS — E78.2 MIXED HYPERLIPIDEMIA: ICD-10-CM

## 2023-12-07 DIAGNOSIS — R73.9 HYPERGLYCEMIA: ICD-10-CM

## 2023-12-07 DIAGNOSIS — Z79.899 LONG-TERM USE OF HIGH-RISK MEDICATION: ICD-10-CM

## 2023-12-07 DIAGNOSIS — I10 ESSENTIAL HYPERTENSION: ICD-10-CM

## 2023-12-07 DIAGNOSIS — I48.0 PAROXYSMAL ATRIAL FIBRILLATION: ICD-10-CM

## 2023-12-07 DIAGNOSIS — Z01.818 PREOP EXAMINATION: Primary | ICD-10-CM

## 2023-12-07 DIAGNOSIS — E03.9 ACQUIRED HYPOTHYROIDISM: ICD-10-CM

## 2023-12-07 PROCEDURE — 1160F RVW MEDS BY RX/DR IN RCRD: CPT | Performed by: FAMILY MEDICINE

## 2023-12-07 PROCEDURE — 99214 OFFICE O/P EST MOD 30 MIN: CPT | Performed by: FAMILY MEDICINE

## 2023-12-07 PROCEDURE — 3074F SYST BP LT 130 MM HG: CPT | Performed by: FAMILY MEDICINE

## 2023-12-07 PROCEDURE — 3078F DIAST BP <80 MM HG: CPT | Performed by: FAMILY MEDICINE

## 2023-12-07 PROCEDURE — 1159F MED LIST DOCD IN RCRD: CPT | Performed by: FAMILY MEDICINE

## 2023-12-07 RX ORDER — AMIODARONE HYDROCHLORIDE 200 MG/1
1 TABLET ORAL 2 TIMES DAILY
COMMUNITY
Start: 2023-09-14

## 2023-12-07 NOTE — PROGRESS NOTES
Yulissa Spaulding presents to [facility].    Chief Complaint:  Preop evaluation, atrial fibrillation, blood pressure, cholesterol    Subjective   History of Present Illness:  Yulissa is being seen today for preoperative evaluation.  She has a torn tendon in her right foot and ankle.  Dr. Centeno from podiatry is asked for preoperative evaluation and surgical clearance.  Yulissa states that she is already seen Dr. Guerrero and received clearance from him in this regard.  Her most recent surgery was a left knee surgery about 3 years ago.  She did not have any significant difficulty with anesthesia at that time.    She has known atrial fibrillation and remains on Eliquis for this.  Her A-fib has been fairly stable most recently.  Again, she has already had cardiology clearance in this regard.    Other health problems include hypertension, hyperlipidemia, and hypothyroidism.  Her blood pressure is well-controlled today.    Review of Systems:  Review of Systems   Constitutional:  Negative for chills and fever.   Respiratory:  Negative for cough and shortness of breath.    Cardiovascular:  Negative for chest pain and palpitations.   Gastrointestinal:  Negative for abdominal pain, nausea and vomiting.   Genitourinary:  Negative for dysuria and frequency.      Objective   Medical History:  Past Medical History:    Anxiety    Atrial fibrillation    Chronic pain disorder    Depression    Disease of thyroid gland    Obsessive-compulsive disorder    Panic disorder    Psychiatric illness    Self-injurious behavior    patient says she picks at her skin when nervous     Past Surgical History:    BREAST SURGERY    tumor removed    CARDIAC SURGERY    installed LOOP recorder    CHOLECYSTECTOMY    GASTRIC SLEEVE LAPAROSCOPIC    HERNIA REPAIR    HYSTERECTOMY    REPLACEMENT TOTAL KNEE      Family History   Problem Relation Age of Onset    Anxiety disorder Mother     Dementia Mother     Depression Mother      Anxiety disorder Sister     Depression Sister     Colon cancer Maternal Aunt     Colon cancer Daughter      Social History     Tobacco Use    Smoking status: Never    Smokeless tobacco: Never   Substance Use Topics    Alcohol use: Yes     Comment: social        Health Maintenance Due   Topic Date Due    ZOSTER VACCINE (1 of 2) Never done    COVID-19 Vaccine (5 - 2023-24 season) 09/01/2023        Immunization History   Administered Date(s) Administered    COVID-19 (MODERNA) 1st,2nd,3rd Dose Monovalent 03/22/2021, 04/19/2021, 01/28/2022    COVID-19 (PFIZER) BIVALENT 12+YRS 12/27/2022    Fluad Quad 65+ 09/02/2023    Fluzone (or Fluarix & Flulaval for VFC) >6mos 10/13/2022    Fluzone High Dose =>65 Years (Vaxcare ONLY) 10/13/2020, 08/25/2021    Fluzone High-Dose 65+yrs 08/25/2021    Pneumococcal Conjugate 13-Valent (PCV13) 01/14/2019    Pneumococcal Polysaccharide (PPSV23) 06/22/2021       Allergies   Allergen Reactions    Atorvastatin Myalgia     Joint pain    Methylmethacrylate Swelling and Other (See Comments)     (bone cement)      Adhesive Tape Rash     Off-brand bandaids          Medications:  Current Outpatient Medications on File Prior to Visit   Medication Sig    Acetaminophen (Tylenol) 325 MG capsule Take 1,000 mg by mouth As Needed.    amiodarone (PACERONE) 200 MG tablet Take 1 tablet by mouth 2 (Two) Times a Day.    busPIRone (BUSPAR) 30 MG tablet Take 1 tablet by mouth 2 (Two) Times a Day    cetirizine (zyrTEC) 10 MG tablet Take 1 tablet by mouth Daily.    desvenlafaxine (Pristiq) 100 MG 24 hr tablet Take 1 tablet by mouth Daily for 180 days. Indications: Major Depressive Disorder    dilTIAZem (TIAZAC) 180 MG 24 hr capsule Take 1 capsule by mouth Daily.    furosemide (Lasix) 20 MG tablet Take 1 tablet by mouth Daily.    gabapentin (Neurontin) 600 MG tablet Take 1 tablet by mouth 2 (Two) Times a Day.    hydroCHLOROthiazide (HYDRODIURIL) 25 MG tablet Take 1 tablet by mouth Daily.    levothyroxine (Synthroid)  "200 MCG tablet Take 1 tablet by mouth Daily.    MELATONIN PO Take 1 tablet by mouth Daily.    Menthol (Biofreeze) 5 % patch Apply 1 patch topically Every Night.    montelukast (SINGULAIR) 10 MG tablet Take 1 tablet by mouth Every Night.    potassium chloride 10 MEQ CR tablet Take 1 tablet by mouth 2 (Two) Times a Day.    pramipexole (Mirapex) 0.25 MG tablet Take 1 tablet by mouth Every Night.    vitamin D3 (vitamin d) 125 MCG (5000 UT) capsule capsule Take 1 capsule by mouth Daily.    [DISCONTINUED] amiodarone (PACERONE) 400 MG tablet Take 1 tablet by mouth Daily.    [DISCONTINUED] dilTIAZem CD (Cartia XT) 240 MG 24 hr capsule Take 1 capsule by mouth Daily.    [DISCONTINUED] doxycycline (VIBRAMYCIN) 100 MG capsule Take 1 capsule by mouth 2 (Two) Times a Day.    [DISCONTINUED] HYDROcodone-acetaminophen (NORCO) 5-325 MG per tablet Take 1 tablet by mouth Every 6 (Six) Hours.    [DISCONTINUED] ketorolac (ACULAR) 0.4 % solution Administer 1 drop into the left eye 4 (Four) Times a Day.    [DISCONTINUED] ketorolac (ACULAR) 0.5 % ophthalmic solution Instill 1 drop in left eye twice daily.    [DISCONTINUED] methylPREDNISolone (Medrol) 4 MG dose pack Take by mouth as directed by package instructions    [DISCONTINUED] prednisoLONE acetate (PRED FORTE) 1 % ophthalmic suspension Instill 1 drop in left eye four times daily.    [DISCONTINUED] Sodium Sulfate-Mag Sulfate-KCl (Sutab) 5838-840-132 MG tablet Take 12 tablets by mouth at 6 pm and 12 tablets 4 hours prior to procedure.     No current facility-administered medications on file prior to visit.       Vital Signs:   /61 (BP Location: Left arm, Patient Position: Sitting)   Pulse 58   Temp 98 °F (36.7 °C) (Oral)   Ht 177.8 cm (70\")   Wt 134 kg (295 lb 12.8 oz)   BMI 42.44 kg/m²       Physical Exam:  Physical Exam  Vitals and nursing note reviewed.   Constitutional:       General: She is not in acute distress.     Appearance: She is obese. She is not ill-appearing. "   HENT:      Right Ear: Tympanic membrane and ear canal normal.      Left Ear: Tympanic membrane and ear canal normal.      Mouth/Throat:      Mouth: Mucous membranes are moist.      Comments: Pharynx appears normal  Eyes:      Extraocular Movements: Extraocular movements intact.      Pupils: Pupils are equal, round, and reactive to light.   Neck:      Thyroid: No thyromegaly.   Cardiovascular:      Rate and Rhythm: Normal rate and regular rhythm.      Heart sounds: No murmur heard.  Pulmonary:      Effort: Pulmonary effort is normal.      Breath sounds: Normal breath sounds.   Abdominal:      General: There is no distension.      Palpations: Abdomen is soft. There is no mass.      Tenderness: There is no abdominal tenderness.   Musculoskeletal:      Cervical back: Normal range of motion.   Skin:     Findings: No lesion or rash.   Neurological:      General: No focal deficit present.      Mental Status: She is oriented to person, place, and time.      Cranial Nerves: No cranial nerve deficit.   Psychiatric:         Mood and Affect: Mood normal.       Result Review   The following data was reviewed by Fabián Jorgensen MD on 12/07/2023.  Lab Results   Component Value Date    WBC 8.08 12/27/2022    HGB 14.0 12/27/2022    HCT 43.6 12/27/2022    MCV 94.4 12/27/2022     12/27/2022     Lab Results   Component Value Date    GLUCOSE 107 (H) 06/26/2023    BUN 18 06/26/2023    CREATININE 0.96 06/26/2023     06/26/2023    K 4.5 06/26/2023     06/26/2023    CO2 29.0 06/26/2023    CALCIUM 9.7 06/26/2023    PROTEINTOT 7.1 06/26/2023    ALBUMIN 4.8 06/26/2023    ALT 27 06/26/2023    AST 26 06/26/2023    ALKPHOS 83 06/26/2023    BILITOT 0.4 06/26/2023    EGFR 64.2 06/26/2023    GLOB 2.3 06/26/2023    AGRATIO 2.1 06/26/2023    BCR 18.8 06/26/2023    ANIONGAP 10.0 06/26/2023      Lab Results   Component Value Date    CHOL 227 (H) 06/26/2023    CHLPL 176 10/14/2020    TRIG 112 06/26/2023    HDL 68 (H)  06/26/2023     (H) 06/26/2023     Lab Results   Component Value Date    TSH 3.730 06/26/2023     Lab Results   Component Value Date    HGBA1C 5.4 08/27/2019          Assessment and Plan:   Today, we have reviewed Yulissa's care.  There is not any worrisome finding on exam or history today for which I would recommend delaying surgery.  From my standpoint, she is cleared for surgery.  Separate cardiac clearance should come from Dr. Guerrero.  She will be having an EKG and some blood work at [facility] in the next day or 2.  I will give her a slip for some additional blood work to follow-up on her problems.  Tentatively, she will plan to see Dr. Gee in January as scheduled.    ADDENDUM - Pod A, please forward a copy of this office visit note along with the clearance form back to Dr. Centeno's office.  Confirm receipt.  Thanks.    Diagnoses and all orders for this visit:    1. Preop examination (Primary)  Comments:  As above.    2. Essential hypertension  Comments:  As above.    3. Mixed hyperlipidemia  -     Lipid Panel; Future    4. Paroxysmal atrial fibrillation  Comments:  As above.    5. Acquired hypothyroidism  -     TSH; Future    6. Hyperglycemia  -     Hemoglobin A1c; Future    7. Long-term use of high-risk medication  Comments:  As above.    Follow Up  Return for Next scheduled follow up.  Patient was given instructions and counseling regarding her condition or for health maintenance advice. Please see specific information pulled into the AVS if appropriate.

## 2023-12-08 ENCOUNTER — ANESTHESIA EVENT (OUTPATIENT)
Dept: GASTROENTEROLOGY | Facility: HOSPITAL | Age: 68
End: 2023-12-08
Payer: MEDICARE

## 2023-12-08 NOTE — ANESTHESIA PREPROCEDURE EVALUATION
Anesthesia Evaluation     Patient summary reviewed and Nursing notes reviewed   history of anesthetic complications:  PONV  NPO Solid Status: > 8 hours  NPO Liquid Status: > 4 hours           Airway   Mallampati: III  TM distance: >3 FB  Neck ROM: full  Possible difficult intubation and Large neck circumference  Dental - normal exam         Pulmonary - normal exam    breath sounds clear to auscultation  Cardiovascular - normal exam  Exercise tolerance: poor (<4 METS)    Rhythm: regular  Rate: normal    (+) hypertension well controlled, dysrhythmias Atrial Fib, Paroxysmal Atrial Fib, hyperlipidemia      Neuro/Psych  (+) psychiatric history Depression and Anxiety  GI/Hepatic/Renal/Endo    (+) morbid obesity, GI bleeding , thyroid problem hypothyroidism    Musculoskeletal     Abdominal   (+) obese    Abdomen: soft.   Substance History      OB/GYN          Other   arthritis,     ROS/Med Hx Other: Last dose eliquis 12/08      Phys Exam Other: Right foot swollen/bruised - recent tendon injury             Anesthesia Plan    ASA 4     general   total IV anesthesia  intravenous induction     Anesthetic plan, risks, benefits, and alternatives have been provided, discussed and informed consent has been obtained with: patient and child.  Pre-procedure education provided  Plan discussed with CRNA.      CODE STATUS:

## 2023-12-09 NOTE — PROGRESS NOTES
Please reach out to Yulissa and confirm if she ended up having preoperative testing done for Dr. Centeno.  My understanding is that she was going to have labs and EKG done yesterday at [facility].  However, I am unable to see results related to this.  If she did have testing done, please reach out to [facility] and see if they can fax lab and EKG results.  Thanks.

## 2023-12-11 ENCOUNTER — APPOINTMENT (OUTPATIENT)
Dept: LAB | Facility: HOSPITAL | Age: 68
End: 2023-12-11
Payer: MEDICARE

## 2023-12-11 ENCOUNTER — HOSPITAL ENCOUNTER (OUTPATIENT)
Dept: CARDIOLOGY | Facility: HOSPITAL | Age: 68
Discharge: HOME | End: 2023-12-11
Payer: MEDICARE

## 2023-12-11 ENCOUNTER — TRANSCRIBE ORDERS (OUTPATIENT)
Dept: ADMINISTRATIVE | Facility: HOSPITAL | Age: 68
End: 2023-12-11
Payer: OTHER GOVERNMENT

## 2023-12-11 ENCOUNTER — HOSPITAL ENCOUNTER (OUTPATIENT)
Facility: HOSPITAL | Age: 68
Setting detail: HOSPITAL OUTPATIENT SURGERY
Discharge: HOME | End: 2023-12-11
Attending: INTERNAL MEDICINE | Admitting: INTERNAL MEDICINE
Payer: MEDICARE

## 2023-12-11 ENCOUNTER — LAB (OUTPATIENT)
Dept: LAB | Facility: HOSPITAL | Age: 68
End: 2023-12-11
Payer: MEDICARE

## 2023-12-11 ENCOUNTER — ANESTHESIA (OUTPATIENT)
Dept: GASTROENTEROLOGY | Facility: HOSPITAL | Age: 68
End: 2023-12-11
Payer: MEDICARE

## 2023-12-11 VITALS
TEMPERATURE: 98.6 F | WEIGHT: 293 LBS | SYSTOLIC BLOOD PRESSURE: 150 MMHG | RESPIRATION RATE: 16 BRPM | DIASTOLIC BLOOD PRESSURE: 83 MMHG | HEART RATE: 61 BPM | BODY MASS INDEX: 42.45 KG/M2 | OXYGEN SATURATION: 98 %

## 2023-12-11 DIAGNOSIS — Z86.010 HISTORY OF COLON POLYPS: ICD-10-CM

## 2023-12-11 DIAGNOSIS — M66.871 SPONTANEOUS RUPTURE OF OTHER TENDONS, RIGHT ANKLE AND FOOT: ICD-10-CM

## 2023-12-11 DIAGNOSIS — R73.9 HYPERGLYCEMIA: ICD-10-CM

## 2023-12-11 DIAGNOSIS — M66.871 SPONTANEOUS RUPTURE OF OTHER TENDONS, RIGHT ANKLE AND FOOT: Primary | ICD-10-CM

## 2023-12-11 DIAGNOSIS — R60.0 LOCALIZED EDEMA: ICD-10-CM

## 2023-12-11 DIAGNOSIS — Z87.19 HISTORY OF CIRRHOSIS: ICD-10-CM

## 2023-12-11 DIAGNOSIS — K62.5 RECTAL BLEEDING: ICD-10-CM

## 2023-12-11 DIAGNOSIS — E03.9 ACQUIRED HYPOTHYROIDISM: ICD-10-CM

## 2023-12-11 DIAGNOSIS — M79.671 PAIN IN RIGHT FOOT: ICD-10-CM

## 2023-12-11 DIAGNOSIS — Z80.0 FH: COLON CANCER: ICD-10-CM

## 2023-12-11 DIAGNOSIS — K92.1 MELENA: ICD-10-CM

## 2023-12-11 DIAGNOSIS — K62.89 ANAL OR RECTAL PAIN: ICD-10-CM

## 2023-12-11 DIAGNOSIS — E78.2 MIXED HYPERLIPIDEMIA: ICD-10-CM

## 2023-12-11 LAB
ALBUMIN SERPL-MCNC: 4.2 G/DL (ref 3.5–5.2)
ALBUMIN/GLOB SERPL: 1.8 G/DL
ALP SERPL-CCNC: 77 U/L (ref 39–117)
ALT SERPL W P-5'-P-CCNC: 36 U/L (ref 1–33)
ANION GAP SERPL CALCULATED.3IONS-SCNC: 10 MMOL/L (ref 5–15)
AST SERPL-CCNC: 28 U/L (ref 1–32)
BILIRUB SERPL-MCNC: 0.4 MG/DL (ref 0–1.2)
BUN SERPL-MCNC: 12 MG/DL (ref 8–23)
BUN/CREAT SERPL: 15 (ref 7–25)
CALCIUM SPEC-SCNC: 9.3 MG/DL (ref 8.6–10.5)
CHLORIDE SERPL-SCNC: 102 MMOL/L (ref 98–107)
CHOLEST SERPL-MCNC: 196 MG/DL (ref 0–200)
CO2 SERPL-SCNC: 27 MMOL/L (ref 22–29)
CREAT SERPL-MCNC: 0.8 MG/DL (ref 0.57–1)
EGFRCR SERPLBLD CKD-EPI 2021: 79.4 ML/MIN/1.73
GLOBULIN UR ELPH-MCNC: 2.4 GM/DL
GLUCOSE SERPL-MCNC: 99 MG/DL (ref 65–99)
HBA1C MFR BLD: 5.4 % (ref 4.8–5.6)
HDLC SERPL-MCNC: 64 MG/DL (ref 40–60)
LDLC SERPL CALC-MCNC: 116 MG/DL (ref 0–100)
LDLC/HDLC SERPL: 1.79 {RATIO}
POTASSIUM SERPL-SCNC: 3.9 MMOL/L (ref 3.5–5.2)
PROT SERPL-MCNC: 6.6 G/DL (ref 6–8.5)
SODIUM SERPL-SCNC: 139 MMOL/L (ref 136–145)
TRIGL SERPL-MCNC: 88 MG/DL (ref 0–150)
TSH SERPL DL<=0.05 MIU/L-ACNC: 0.83 UIU/ML (ref 0.27–4.2)
VLDLC SERPL-MCNC: 16 MG/DL (ref 5–40)

## 2023-12-11 PROCEDURE — 25010000002 PROPOFOL 10 MG/ML EMULSION: Performed by: NURSE ANESTHETIST, CERTIFIED REGISTERED

## 2023-12-11 PROCEDURE — 25810000003 LACTATED RINGERS PER 1000 ML: Performed by: ANESTHESIOLOGY

## 2023-12-11 PROCEDURE — 80061 LIPID PANEL: CPT

## 2023-12-11 PROCEDURE — 80053 COMPREHEN METABOLIC PANEL: CPT

## 2023-12-11 PROCEDURE — 83036 HEMOGLOBIN GLYCOSYLATED A1C: CPT

## 2023-12-11 PROCEDURE — 88305 TISSUE EXAM BY PATHOLOGIST: CPT | Performed by: INTERNAL MEDICINE

## 2023-12-11 PROCEDURE — 93005 ELECTROCARDIOGRAM TRACING: CPT | Performed by: PODIATRIST

## 2023-12-11 PROCEDURE — 85025 COMPLETE CBC W/AUTO DIFF WBC: CPT

## 2023-12-11 PROCEDURE — 84443 ASSAY THYROID STIM HORMONE: CPT

## 2023-12-11 PROCEDURE — 36415 COLL VENOUS BLD VENIPUNCTURE: CPT

## 2023-12-11 PROCEDURE — 25010000002 ONDANSETRON PER 1 MG

## 2023-12-11 RX ORDER — PROPOFOL 10 MG/ML
VIAL (ML) INTRAVENOUS AS NEEDED
Status: DISCONTINUED | OUTPATIENT
Start: 2023-12-11 | End: 2023-12-11 | Stop reason: SURG

## 2023-12-11 RX ORDER — SODIUM CHLORIDE, SODIUM LACTATE, POTASSIUM CHLORIDE, CALCIUM CHLORIDE 600; 310; 30; 20 MG/100ML; MG/100ML; MG/100ML; MG/100ML
30 INJECTION, SOLUTION INTRAVENOUS CONTINUOUS
Status: DISCONTINUED | OUTPATIENT
Start: 2023-12-11 | End: 2023-12-11 | Stop reason: HOSPADM

## 2023-12-11 RX ORDER — LIDOCAINE HYDROCHLORIDE 20 MG/ML
INJECTION, SOLUTION EPIDURAL; INFILTRATION; INTRACAUDAL; PERINEURAL AS NEEDED
Status: DISCONTINUED | OUTPATIENT
Start: 2023-12-11 | End: 2023-12-11 | Stop reason: SURG

## 2023-12-11 RX ORDER — ONDANSETRON 2 MG/ML
4 INJECTION INTRAMUSCULAR; INTRAVENOUS ONCE
Status: COMPLETED | OUTPATIENT
Start: 2023-12-11 | End: 2023-12-11

## 2023-12-11 RX ADMIN — PROPOFOL 80 MG: 10 INJECTION, EMULSION INTRAVENOUS at 10:03

## 2023-12-11 RX ADMIN — SODIUM CHLORIDE, POTASSIUM CHLORIDE, SODIUM LACTATE AND CALCIUM CHLORIDE 30 ML/HR: 600; 310; 30; 20 INJECTION, SOLUTION INTRAVENOUS at 09:32

## 2023-12-11 RX ADMIN — PROPOFOL 175 MCG/KG/MIN: 10 INJECTION, EMULSION INTRAVENOUS at 10:03

## 2023-12-11 RX ADMIN — LIDOCAINE HYDROCHLORIDE 50 MG: 20 INJECTION, SOLUTION EPIDURAL; INFILTRATION; INTRACAUDAL; PERINEURAL at 10:03

## 2023-12-11 RX ADMIN — ONDANSETRON 4 MG: 2 INJECTION INTRAMUSCULAR; INTRAVENOUS at 09:52

## 2023-12-11 NOTE — H&P
Pre Procedure History & Physical    Chief Complaint:   Cirrhosis, melena, rectal bleeding    Subjective     HPI:   69 yo F here for eval of cirrhosis, melena, rectal bleeding.    Past Medical History:   Past Medical History:   Diagnosis Date    Anxiety     Atrial fibrillation     Chronic pain disorder     Depression     Disease of thyroid gland     Obsessive-compulsive disorder     Panic disorder     Psychiatric illness     Self-injurious behavior     patient says she picks at her skin when nervous       Past Surgical History:  Past Surgical History:   Procedure Laterality Date    BREAST SURGERY Left     tumor removed    CARDIAC SURGERY      installed LOOP recorder    CHOLECYSTECTOMY      GASTRIC SLEEVE LAPAROSCOPIC      HERNIA REPAIR      HYSTERECTOMY      REPLACEMENT TOTAL KNEE Left        Family History:  Family History   Problem Relation Age of Onset    Anxiety disorder Mother     Dementia Mother     Depression Mother     Anxiety disorder Sister     Depression Sister     Colon cancer Maternal Aunt     Colon cancer Daughter        Social History:   reports that she has never smoked. She has never used smokeless tobacco. She reports current alcohol use. She reports that she does not use drugs.    Medications:   Medications Prior to Admission   Medication Sig Dispense Refill Last Dose    Acetaminophen (Tylenol) 325 MG capsule Take 1,000 mg by mouth As Needed.       amiodarone (PACERONE) 200 MG tablet Take 1 tablet by mouth 2 (Two) Times a Day.       busPIRone (BUSPAR) 30 MG tablet Take 1 tablet by mouth 2 (Two) Times a Day 180 tablet 1     cetirizine (zyrTEC) 10 MG tablet Take 1 tablet by mouth Daily. 90 tablet 1     desvenlafaxine (Pristiq) 100 MG 24 hr tablet Take 1 tablet by mouth Daily for 180 days. Indications: Major Depressive Disorder 90 tablet 1     dilTIAZem (TIAZAC) 180 MG 24 hr capsule Take 1 capsule by mouth Daily.       furosemide (Lasix) 20 MG tablet Take 1 tablet by mouth Daily. 90 tablet 1      gabapentin (Neurontin) 600 MG tablet Take 1 tablet by mouth 2 (Two) Times a Day. 60 tablet 1     hydroCHLOROthiazide (HYDRODIURIL) 25 MG tablet Take 1 tablet by mouth Daily. 90 tablet 1     levothyroxine (Synthroid) 200 MCG tablet Take 1 tablet by mouth Daily. 90 tablet 1     MELATONIN PO Take 1 tablet by mouth Daily.       Menthol (Biofreeze) 5 % patch Apply 1 patch topically Every Night. 90 patch 1     montelukast (SINGULAIR) 10 MG tablet Take 1 tablet by mouth Every Night. 90 tablet 1     potassium chloride 10 MEQ CR tablet Take 1 tablet by mouth 2 (Two) Times a Day. 180 tablet 1     pramipexole (Mirapex) 0.25 MG tablet Take 1 tablet by mouth Every Night. 90 tablet 1     vitamin D3 (vitamin d) 125 MCG (5000 UT) capsule capsule Take 1 capsule by mouth Daily. 90 capsule 1        Allergies:  Atorvastatin, Methylmethacrylate, and Adhesive tape    ROS:    Pertinent items are noted in HPI     Objective     Weight 134 kg (295 lb 13.7 oz).    Physical Exam   Constitutional: Pt is oriented to person, place, and time and well-developed, well-nourished, and in no distress.   Mouth/Throat: Oropharynx is clear and moist.   Neck: Normal range of motion.   Cardiovascular: Normal rate, regular rhythm and normal heart sounds.    Pulmonary/Chest: Effort normal and breath sounds normal.   Abdominal: Soft. Nontender  Skin: Skin is warm and dry.   Psychiatric: Mood, memory, affect and judgment normal.     Assessment & Plan     Diagnosis:  Cirrhosis, melena, rectal bleeding    Anticipated Surgical Procedure:  EGD/colonoscopy    The risks, benefits, and alternatives of this procedure have been discussed with the patient or the responsible party- the patient understands and agrees to proceed.

## 2023-12-11 NOTE — PROGRESS NOTES
Pts daughter states pt has colonoscopy today at [facility] and she is going to have preop testing done there

## 2023-12-11 NOTE — ANESTHESIA POSTPROCEDURE EVALUATION
Patient: Yulissa Spaulding    Procedure Summary       Date: 12/11/23 Room / Location: [facility] ENDOSCOPY 1 / [facility] ENDOSCOPY    Anesthesia Start: 1002 Anesthesia Stop: 1040    Procedures:       ESOPHAGOGASTRODUODENOSCOPY WITH BIOPSIES      COLONOSCOPY WITH POLYPECTOMIES Diagnosis:       Rectal bleeding      Melena      History of colon polyps      FH: colon cancer      Anal or rectal pain      History of cirrhosis      (Rectal bleeding [K62.5])      (Melena [K92.1])      (History of colon polyps [Z86.010])      (FH: colon cancer [Z80.0])      (Anal or rectal pain [K62.89])      (History of cirrhosis [Z87.19])    Surgeons: Eun Gasca MD Provider: Ameena Alvarado CRNA    Anesthesia Type: general ASA Status: 4            Anesthesia Type: general    Vitals  Vitals Value Taken Time   /76 12/11/23 1045   Temp     Pulse 56 12/11/23 1047   Resp     SpO2 99 % 12/11/23 1047   Vitals shown include unfiled device data.        Post Anesthesia Care and Evaluation    Post-procedure mental status: acceptable.  Pain management: satisfactory to patient    Airway patency: patent  Anesthetic complications: No anesthetic complications    Cardiovascular status: acceptable  Respiratory status: acceptable    Comments: Per chart review

## 2023-12-12 ENCOUNTER — TELEPHONE (OUTPATIENT)
Dept: GASTROENTEROLOGY | Facility: CLINIC | Age: 68
End: 2023-12-12
Payer: OTHER GOVERNMENT

## 2023-12-12 LAB
BASOPHILS # BLD AUTO: 0.06 10*3/MM3 (ref 0–0.2)
BASOPHILS NFR BLD AUTO: 0.7 % (ref 0–1.5)
CYTO UR: NORMAL
DEPRECATED RDW RBC AUTO: 43.1 FL (ref 37–54)
EOSINOPHIL # BLD AUTO: 0.09 10*3/MM3 (ref 0–0.4)
EOSINOPHIL NFR BLD AUTO: 1 % (ref 0.3–6.2)
ERYTHROCYTE [DISTWIDTH] IN BLOOD BY AUTOMATED COUNT: 12.7 % (ref 12.3–15.4)
HCT VFR BLD AUTO: 40.9 % (ref 34–46.6)
HGB BLD-MCNC: 13.4 G/DL (ref 12–15.9)
IMM GRANULOCYTES # BLD AUTO: 0.04 10*3/MM3 (ref 0–0.05)
IMM GRANULOCYTES NFR BLD AUTO: 0.5 % (ref 0–0.5)
LAB AP CASE REPORT: NORMAL
LAB AP CLINICAL INFORMATION: NORMAL
LYMPHOCYTES # BLD AUTO: 0.98 10*3/MM3 (ref 0.7–3.1)
LYMPHOCYTES NFR BLD AUTO: 11.3 % (ref 19.6–45.3)
MCH RBC QN AUTO: 30.7 PG (ref 26.6–33)
MCHC RBC AUTO-ENTMCNC: 32.8 G/DL (ref 31.5–35.7)
MCV RBC AUTO: 93.8 FL (ref 79–97)
MONOCYTES # BLD AUTO: 0.61 10*3/MM3 (ref 0.1–0.9)
MONOCYTES NFR BLD AUTO: 7.1 % (ref 5–12)
NEUTROPHILS NFR BLD AUTO: 6.87 10*3/MM3 (ref 1.7–7)
NEUTROPHILS NFR BLD AUTO: 79.4 % (ref 42.7–76)
NRBC BLD AUTO-RTO: 0 /100 WBC (ref 0–0.2)
PATH REPORT.FINAL DX SPEC: NORMAL
PATH REPORT.GROSS SPEC: NORMAL
PLATELET # BLD AUTO: 311 10*3/MM3 (ref 140–450)
PMV BLD AUTO: 10.9 FL (ref 6–12)
QT INTERVAL: 541 MS
QTC INTERVAL: 541 MS
RBC # BLD AUTO: 4.36 10*6/MM3 (ref 3.77–5.28)
WBC NRBC COR # BLD AUTO: 8.65 10*3/MM3 (ref 3.4–10.8)

## 2023-12-12 RX ORDER — ONDANSETRON 4 MG/1
4 TABLET, FILM COATED ORAL EVERY 8 HOURS PRN
Qty: 20 TABLET | Refills: 0 | Status: SHIPPED | OUTPATIENT
Start: 2023-12-12

## 2023-12-12 NOTE — TELEPHONE ENCOUNTER
Hub staff attempted to follow warm transfer process and was unsuccessful     Caller: Yulissa Spaulding    Relationship to patient: Self    Best call back number: [phone]     Patient is needing: PATIENT HAS UPPER AND LOWER SCOPES DONE YESTERDAY, AND HAS BEEN SEVERELY NAUSEOUS SINCE. PLEASE SEND AN RX OF SOMETHING TO [facility] FOR THIS. CALL BACK # [phone].

## 2023-12-13 ENCOUNTER — TELEPHONE (OUTPATIENT)
Dept: GASTROENTEROLOGY | Facility: CLINIC | Age: 68
End: 2023-12-13
Payer: OTHER GOVERNMENT

## 2023-12-13 NOTE — TELEPHONE ENCOUNTER
----- Message from RAVIN Mera sent at 12/12/2023  3:48 PM EST -----  Biopsies are consistent with reflux esophagitis and gastritis.  Continue current PPI therapy and schedule for follow-up.  Colon biopsies benign.  Recall colonoscopy in 5 years.

## 2023-12-14 NOTE — TELEPHONE ENCOUNTER
Called and spoke with patient and notified her of her results and recommendations. Patient verbalized understanding. Patient said that she will not be going through this process again especially at 68. Patient reported that she is still sick from the procedure. Patient stated that her stomach is still very uneasy and that she can only eat plain mashed potatoes and toast. Patient stated that her stool is yellow and that her daughter stated that it was bile.     Patient declined scheduling a follow up appointment at this time as she reported that she is having tendon surgery next week and doesn't know when she will able to be mobile. Patient requested a mychart visit however, I advised that because she is actively having symptoms, Darlene will not do a mychart visit and will need to see her in office. Patient verbalized understanding and will call back when she can to schedule her follow up.     Recall placed.

## 2023-12-15 RX ORDER — PANTOPRAZOLE SODIUM 20 MG/1
20 TABLET, DELAYED RELEASE ORAL DAILY
Qty: 30 TABLET | Refills: 1 | Status: SHIPPED | OUTPATIENT
Start: 2023-12-15

## 2023-12-15 NOTE — TELEPHONE ENCOUNTER
Patient was notified and reported that her surgery has been moved to the 29th of December to give her stomach time to settle.    Patient reported that she has Zofran already. Patient also reported that she is not on a PPI and hasn't been. A PPI has been placed for second sign

## 2023-12-28 DIAGNOSIS — F41.9 ANXIETY: ICD-10-CM

## 2023-12-28 DIAGNOSIS — I48.11 LONGSTANDING PERSISTENT ATRIAL FIBRILLATION: ICD-10-CM

## 2023-12-28 DIAGNOSIS — J30.2 SEASONAL ALLERGIC RHINITIS, UNSPECIFIED TRIGGER: ICD-10-CM

## 2023-12-28 DIAGNOSIS — E55.9 VITAMIN D DEFICIENCY: ICD-10-CM

## 2023-12-28 DIAGNOSIS — I10 ESSENTIAL HYPERTENSION: ICD-10-CM

## 2023-12-28 DIAGNOSIS — G25.81 RESTLESS LEG SYNDROME: ICD-10-CM

## 2023-12-28 NOTE — TELEPHONE ENCOUNTER
Caller: Yulissa Spaulding    Relationship: Self    Best call back number:    [phone]       Requested Prescriptions:   Requested Prescriptions     Pending Prescriptions Disp Refills    amiodarone (PACERONE) 200 MG tablet       Sig: Take 1 tablet by mouth 2 (Two) Times a Day.    busPIRone (BUSPAR) 30 MG tablet 180 tablet 1     Sig: Take 1 tablet by mouth 2 (Two) Times a Day    desvenlafaxine (Pristiq) 100 MG 24 hr tablet 90 tablet 1     Sig: Take 1 tablet by mouth Daily for 180 days. Indications: Major Depressive Disorder    dilTIAZem (TIAZAC) 180 MG 24 hr capsule 30 capsule 11     Sig: Take 1 capsule by mouth Daily.    furosemide (Lasix) 20 MG tablet 90 tablet 1     Sig: Take 1 tablet by mouth Daily.    hydroCHLOROthiazide (HYDRODIURIL) 25 MG tablet 90 tablet 1     Sig: Take 1 tablet by mouth Daily.    levothyroxine (Synthroid) 200 MCG tablet 90 tablet 1     Sig: Take 1 tablet by mouth Daily.    montelukast (SINGULAIR) 10 MG tablet 90 tablet 1     Sig: Take 1 tablet by mouth Every Night.    potassium chloride 10 MEQ CR tablet 180 tablet 1     Sig: Take 1 tablet by mouth 2 (Two) Times a Day.    pramipexole (Mirapex) 0.25 MG tablet 90 tablet 1     Sig: Take 1 tablet by mouth Every Night.        Pharmacy where request should be sent: [facility] - [address] - [phone]  - [phone] FX     Last office visit with prescribing clinician: 6/26/2023   Last telemedicine visit with prescribing clinician: Visit date not found   Next office visit with prescribing clinician: 2/22/2024     Additional details provided by patient: PATIENT STATES THAT SHE IS HAVING SURGERY AND WILL BE OFF HER FEET FOR 8 WEEKS. SHE STATES THAT SHE WOULD LIKE 2 MONTHS OF REFILLS CALLED IN    Does the patient have less than a 3 day supply:  [] Yes  [] No    Would you like a call back once the refill request has been completed: [] Yes [] No    If the office needs to give you a call back, can they leave a  voicemail: [] Yes [] No    Rosi Jang Rep   12/28/23 11:27 EST

## 2023-12-29 PROCEDURE — 88305 TISSUE EXAM BY PATHOLOGIST: CPT | Performed by: PODIATRIST

## 2023-12-29 RX ORDER — DILTIAZEM HYDROCHLORIDE 180 MG/1
180 CAPSULE, EXTENDED RELEASE ORAL DAILY
Qty: 90 CAPSULE | Refills: 0 | Status: SHIPPED | OUTPATIENT
Start: 2023-12-29 | End: 2024-12-28

## 2023-12-29 RX ORDER — DESVENLAFAXINE 100 MG/1
100 TABLET, EXTENDED RELEASE ORAL DAILY
Qty: 90 TABLET | Refills: 0 | Status: SHIPPED | OUTPATIENT
Start: 2023-12-29 | End: 2024-06-26

## 2023-12-29 RX ORDER — MONTELUKAST SODIUM 10 MG/1
10 TABLET ORAL NIGHTLY
Qty: 90 TABLET | Refills: 0 | Status: SHIPPED | OUTPATIENT
Start: 2023-12-29

## 2023-12-29 RX ORDER — FUROSEMIDE 20 MG/1
20 TABLET ORAL DAILY
Qty: 90 TABLET | Refills: 0 | Status: SHIPPED | OUTPATIENT
Start: 2023-12-29

## 2023-12-29 RX ORDER — BUSPIRONE HYDROCHLORIDE 30 MG/1
30 TABLET ORAL 2 TIMES DAILY
Qty: 180 TABLET | Refills: 0 | Status: SHIPPED | OUTPATIENT
Start: 2023-12-29 | End: 2024-06-26

## 2023-12-29 RX ORDER — LEVOTHYROXINE SODIUM 0.2 MG/1
200 TABLET ORAL DAILY
Qty: 90 TABLET | Refills: 0 | Status: SHIPPED | OUTPATIENT
Start: 2023-12-29

## 2023-12-29 RX ORDER — HYDROCHLOROTHIAZIDE 25 MG/1
25 TABLET ORAL DAILY
Qty: 90 TABLET | Refills: 0 | Status: SHIPPED | OUTPATIENT
Start: 2023-12-29

## 2023-12-29 RX ORDER — POTASSIUM CHLORIDE 750 MG/1
10 TABLET, FILM COATED, EXTENDED RELEASE ORAL 2 TIMES DAILY
Qty: 180 TABLET | Refills: 0 | Status: SHIPPED | OUTPATIENT
Start: 2023-12-29

## 2023-12-29 RX ORDER — PRAMIPEXOLE DIHYDROCHLORIDE 0.25 MG/1
0.25 TABLET ORAL NIGHTLY
Qty: 90 TABLET | Refills: 0 | Status: SHIPPED | OUTPATIENT
Start: 2023-12-29

## 2023-12-29 RX ORDER — AMIODARONE HYDROCHLORIDE 200 MG/1
200 TABLET ORAL 2 TIMES DAILY
Status: CANCELLED | OUTPATIENT
Start: 2023-12-29

## 2024-01-02 ENCOUNTER — LAB REQUISITION (OUTPATIENT)
Dept: LAB | Facility: HOSPITAL | Age: 69
End: 2024-01-02
Payer: MEDICARE

## 2024-01-02 DIAGNOSIS — M79.671 PAIN IN RIGHT FOOT: ICD-10-CM

## 2024-01-02 DIAGNOSIS — M66.871 SPONTANEOUS RUPTURE OF OTHER TENDONS, RIGHT ANKLE AND FOOT: ICD-10-CM

## 2024-01-03 LAB
LAB AP CASE REPORT: NORMAL
PATH REPORT.FINAL DX SPEC: NORMAL
PATH REPORT.GROSS SPEC: NORMAL

## 2024-01-31 ENCOUNTER — OFFICE VISIT (OUTPATIENT)
Dept: FAMILY MEDICINE CLINIC | Age: 69
End: 2024-01-31
Payer: MEDICARE

## 2024-01-31 VITALS
OXYGEN SATURATION: 98 % | HEART RATE: 58 BPM | DIASTOLIC BLOOD PRESSURE: 60 MMHG | SYSTOLIC BLOOD PRESSURE: 133 MMHG | TEMPERATURE: 98.5 F | BODY MASS INDEX: 42.45 KG/M2 | HEIGHT: 70 IN

## 2024-01-31 DIAGNOSIS — R60.1 GENERALIZED EDEMA: Primary | ICD-10-CM

## 2024-01-31 DIAGNOSIS — M66.871: ICD-10-CM

## 2024-01-31 DIAGNOSIS — L03.90 CELLULITIS, UNSPECIFIED CELLULITIS SITE: ICD-10-CM

## 2024-01-31 PROBLEM — E55.9 VITAMIN D DEFICIENCY: Status: ACTIVE | Noted: 2024-01-31

## 2024-01-31 PROBLEM — F41.8 MIXED ANXIETY DEPRESSIVE DISORDER: Status: ACTIVE | Noted: 2024-01-31

## 2024-01-31 PROBLEM — J30.2 SEASONAL ALLERGIES: Status: ACTIVE | Noted: 2024-01-31

## 2024-01-31 PROBLEM — R00.2 PALPITATIONS: Status: ACTIVE | Noted: 2024-01-31

## 2024-01-31 PROBLEM — G47.00 INSOMNIA: Status: ACTIVE | Noted: 2024-01-31

## 2024-01-31 PROCEDURE — 1159F MED LIST DOCD IN RCRD: CPT | Performed by: NURSE PRACTITIONER

## 2024-01-31 PROCEDURE — 1160F RVW MEDS BY RX/DR IN RCRD: CPT | Performed by: NURSE PRACTITIONER

## 2024-01-31 PROCEDURE — 3075F SYST BP GE 130 - 139MM HG: CPT | Performed by: NURSE PRACTITIONER

## 2024-01-31 PROCEDURE — 3078F DIAST BP <80 MM HG: CPT | Performed by: NURSE PRACTITIONER

## 2024-01-31 PROCEDURE — 99214 OFFICE O/P EST MOD 30 MIN: CPT | Performed by: NURSE PRACTITIONER

## 2024-01-31 RX ORDER — FUROSEMIDE 20 MG/1
40 TABLET ORAL 2 TIMES DAILY
Qty: 56 TABLET | Refills: 0 | Status: SHIPPED | OUTPATIENT
Start: 2024-01-31 | End: 2024-02-14

## 2024-01-31 RX ORDER — HYDROCODONE BITARTRATE AND ACETAMINOPHEN 5; 325 MG/1; MG/1
1 TABLET ORAL EVERY 6 HOURS PRN
COMMUNITY

## 2024-01-31 RX ORDER — POTASSIUM CHLORIDE 1500 MG/1
20 TABLET, EXTENDED RELEASE ORAL 2 TIMES DAILY
Qty: 28 TABLET | Refills: 0 | Status: SHIPPED | OUTPATIENT
Start: 2024-01-31 | End: 2024-02-14

## 2024-01-31 RX ORDER — DOXYCYCLINE HYCLATE 100 MG/1
100 CAPSULE ORAL 2 TIMES DAILY
Qty: 14 CAPSULE | Refills: 0 | Status: SHIPPED | OUTPATIENT
Start: 2024-01-31 | End: 2024-02-07

## 2024-01-31 NOTE — PROGRESS NOTES
"Chief Complaint  Fluid Retention (Sx started 2 weeks ago and worsened 3 days ago )    Subjective    Patient is in today with complaints of generalized swelling, that is mostly noticeable in her low back, buttocks, and bilateral upper legs.  Patient had spontaneous rupture of tendon in her ankle, and is now in a cast and nonweightbearing.  Swelling began about 2 weeks ago, but caregiver has noticed that it has been worse over the past 3 days with her skin being really tight.  I have increased her Lasix from 20 mg to 40 mg daily, but does not seem to be providing much relief.        Yulissa Spaulding presents to [facility]  Leg Swelling  This is a recurrent problem. The current episode started 1 to 4 weeks ago. The problem has been gradually worsening. Associated symptoms include coughing. Pertinent negatives include no chest pain, chills or fever.   Cough  This is a new problem. The current episode started 1 to 4 weeks ago. The cough is Productive of green sputum. Pertinent negatives include no chest pain, chills, fever or shortness of breath.       Objective   Vital Signs:  /60 (BP Location: Left arm, Patient Position: Sitting, Cuff Size: Adult)   Pulse 58   Temp 98.5 °F (36.9 °C) (Temporal)   Ht 177.8 cm (70\")   SpO2 98% Comment: room air  BMI 42.45 kg/m²   Estimated body mass index is 42.45 kg/m² as calculated from the following:    Height as of this encounter: 177.8 cm (70\").    Weight as of 12/11/23: 134 kg (295 lb 13.7 oz).               Physical Exam  HENT:      Head: Normocephalic.   Cardiovascular:      Rate and Rhythm: Regular rhythm. Bradycardia present.   Pulmonary:      Effort: Pulmonary effort is normal. No respiratory distress.      Breath sounds: Normal breath sounds. No stridor. No wheezing, rhonchi or rales.   Musculoskeletal:      Lumbar back: Edema present.      Right upper leg: Edema present.      Left upper leg: Edema present.      Right lower leg: " Edema present.      Left lower leg: Edema present.      Comments: Right ankle in cast from toes to below knee   Skin:     Capillary Refill: Capillary refill takes 2 to 3 seconds.      Findings: Erythema present.             Comments: Erythema, no warmth noted   Neurological:      Mental Status: She is alert and oriented to person, place, and time.   Psychiatric:         Mood and Affect: Mood normal.        Result Review :      Renal Profile          6/26/2023    10:52 12/11/2023    11:30   Renal Profile   BUN 18  12    Creatinine 0.96  0.80                   Assessment and Plan     Diagnoses and all orders for this visit:    1. Generalized edema (Primary)  -     furosemide (Lasix) 20 MG tablet; Take 2 tablets by mouth 2 (Two) Times a Day for 14 days.  Dispense: 56 tablet; Refill: 0  -     potassium chloride ER (K-TAB) 20 MEQ tablet controlled-release ER tablet; Take 1 tablet by mouth 2 (Two) Times a Day for 14 days.  Dispense: 28 tablet; Refill: 0    2. Cellulitis, unspecified cellulitis site  -     doxycycline (VIBRAMYCIN) 100 MG capsule; Take 1 capsule by mouth 2 (Two) Times a Day for 7 days.  Dispense: 14 capsule; Refill: 0    3. Spontaneous rupture of tendon of right ankle, unspecified tendon type    Recommend moving around as much as possible.  Limiting sodium intake.  Patient is current with cardiology, and if she does not experience improvement I recommend a sooner follow-up with them.         Follow Up     Return in 22 days (on 2/22/2024), or if symptoms worsen or fail to improve, for Next scheduled follow up with PCP.  Patient was given instructions and counseling regarding her condition or for health maintenance advice. Please see specific information pulled into the AVS if appropriate.

## 2024-02-08 ENCOUNTER — TELEPHONE (OUTPATIENT)
Dept: FAMILY MEDICINE CLINIC | Age: 69
End: 2024-02-08
Payer: OTHER GOVERNMENT

## 2024-02-08 NOTE — TELEPHONE ENCOUNTER
Pt's daughter called to schedule a hospital f/u. She was admitted for fluid retention at [facility] and was d/c 2/8/24. [facility] didn't have anything available until April, so was sent to the nurse to schedule.

## 2024-02-09 ENCOUNTER — READMISSION MANAGEMENT (OUTPATIENT)
Dept: CALL CENTER | Facility: HOSPITAL | Age: 69
End: 2024-02-09
Payer: OTHER GOVERNMENT

## 2024-02-09 ENCOUNTER — TRANSITIONAL CARE MANAGEMENT TELEPHONE ENCOUNTER (OUTPATIENT)
Dept: CALL CENTER | Facility: HOSPITAL | Age: 69
End: 2024-02-09
Payer: OTHER GOVERNMENT

## 2024-02-09 ENCOUNTER — TELEPHONE (OUTPATIENT)
Dept: FAMILY MEDICINE CLINIC | Age: 69
End: 2024-02-09
Payer: OTHER GOVERNMENT

## 2024-02-09 NOTE — TELEPHONE ENCOUNTER
PATIENT HAS BEEN SCHEDULED FOR 2/15/24 AT 3PM WITH [facility]. ADMITTED ON 2/3/24 TO [facility], DISCHARGED ON 2/8/24.

## 2024-02-09 NOTE — OUTREACH NOTE
Call Center TCM Note      Flowsheet Row Responses   [facility] patient discharged from? Non-BH  [[facility]]   Does the patient have one of the following disease processes/diagnoses(primary or secondary)? Other   TCM attempt successful? Yes   Call start time 1431   Call end time 1434   Discharge diagnosis fluid retention   Meds reviewed with patient/caregiver? Yes   Is the patient having any side effects they believe may be caused by any medication additions or changes? No   Does the patient have all medications ordered at discharge? Yes   Is the patient taking all medications as directed (includes completed medication regime)? Yes   Medication comments increased Lasix   Comments HOSP DC FU appt 2/15/24 3 pm   Does the patient have an appointment with their PCP within 7-14 days of discharge? Yes   Has home health visited the patient within 72 hours of discharge? N/A   Psychosocial issues? No   Did the patient receive a copy of their discharge instructions? Yes   Nursing interventions Reviewed instructions with patient   What is the patient's perception of their health status since discharge? Improving   Is the patient/caregiver able to teach back signs and symptoms related to disease process for when to call PCP? Yes   Is the patient/caregiver able to teach back signs and symptoms related to disease process for when to call 911? Yes   Is the patient/caregiver able to teach back the hierarchy of who to call/visit for symptoms/problems? PCP, Specialist, Home health nurse, Urgent Care, ED, 911 Yes   TCM call completed? Yes   Wrap up additional comments Pt reports improvement. Pt also got cast off today and is wt bearing,   Call end time 1434            Hanny Reese RN    2/9/2024, 14:35 EST

## 2024-02-09 NOTE — OUTREACH NOTE
Prep Survey      Flowsheet Row Responses   [facility] facility patient discharged from? Non-BH   Is LACE score < 7 ? Non-BH Discharge   Eligibility [facility]   Date of Discharge 02/08/24   Discharge Disposition Home or Self Care   Discharge diagnosis fluid retention   Does the patient have one of the following disease processes/diagnoses(primary or secondary)? Other   Prep survey completed? Yes            Kristan TAFOYA - Registered Nurse

## 2024-02-09 NOTE — OUTREACH NOTE
Call Center TCM Note      Flowsheet Row Responses   [facility] patient discharged from? Non-BH  [[facility]]   Does the patient have one of the following disease processes/diagnoses(primary or secondary)? Other   TCM attempt successful? No   Unsuccessful attempts Attempt 1   Comments PCP appt 2/22/24 130 as listed on schedule , however does not state HOSP DC FU and no other appts available.   Does the patient have an appointment with their PCP within 7-14 days of discharge? Yes            Hanny Reese RN    2/9/2024, 08:49 EST

## 2024-02-09 NOTE — TELEPHONE ENCOUNTER
Patient's daughter called to schedule a hospital follow up. Patient was discharged from [facility] on 2/8 and was seen for fluid retention. She waited for awhile and then hung up.

## 2024-02-12 RX ORDER — PANTOPRAZOLE SODIUM 20 MG/1
20 TABLET, DELAYED RELEASE ORAL DAILY
Qty: 30 TABLET | Refills: 1 | Status: SHIPPED | OUTPATIENT
Start: 2024-02-12

## 2024-02-15 ENCOUNTER — LAB (OUTPATIENT)
Dept: LAB | Facility: HOSPITAL | Age: 69
End: 2024-02-15
Payer: MEDICARE

## 2024-02-15 ENCOUNTER — OFFICE VISIT (OUTPATIENT)
Dept: FAMILY MEDICINE CLINIC | Age: 69
End: 2024-02-15
Payer: MEDICARE

## 2024-02-15 VITALS
HEART RATE: 61 BPM | DIASTOLIC BLOOD PRESSURE: 73 MMHG | SYSTOLIC BLOOD PRESSURE: 136 MMHG | OXYGEN SATURATION: 93 % | HEIGHT: 70 IN | WEIGHT: 293 LBS | BODY MASS INDEX: 41.95 KG/M2

## 2024-02-15 DIAGNOSIS — F32.4 MAJOR DEPRESSIVE DISORDER WITH SINGLE EPISODE, IN PARTIAL REMISSION: ICD-10-CM

## 2024-02-15 DIAGNOSIS — E78.2 MIXED HYPERLIPIDEMIA: ICD-10-CM

## 2024-02-15 DIAGNOSIS — E03.9 ACQUIRED HYPOTHYROIDISM: ICD-10-CM

## 2024-02-15 DIAGNOSIS — G62.89 OTHER POLYNEUROPATHY: ICD-10-CM

## 2024-02-15 DIAGNOSIS — R05.1 ACUTE COUGH: ICD-10-CM

## 2024-02-15 DIAGNOSIS — I48.0 PAROXYSMAL ATRIAL FIBRILLATION: Primary | ICD-10-CM

## 2024-02-15 DIAGNOSIS — F51.01 PRIMARY INSOMNIA: ICD-10-CM

## 2024-02-15 DIAGNOSIS — I50.33 ACUTE ON CHRONIC DIASTOLIC (CONGESTIVE) HEART FAILURE: ICD-10-CM

## 2024-02-15 DIAGNOSIS — I10 ESSENTIAL HYPERTENSION: ICD-10-CM

## 2024-02-15 DIAGNOSIS — G25.81 RESTLESS LEG SYNDROME: ICD-10-CM

## 2024-02-15 DIAGNOSIS — R60.1 GENERALIZED EDEMA: ICD-10-CM

## 2024-02-15 DIAGNOSIS — Z79.899 HIGH RISK MEDICATION USE: ICD-10-CM

## 2024-02-15 LAB
ALBUMIN SERPL-MCNC: 4.1 G/DL (ref 3.5–5.2)
ALBUMIN/GLOB SERPL: 1.5 G/DL
ALP SERPL-CCNC: 78 U/L (ref 39–117)
ALT SERPL W P-5'-P-CCNC: 18 U/L (ref 1–33)
AMPHET+METHAMPHET UR QL: NEGATIVE
AMPHETAMINE INTERNAL CONTROL: ABNORMAL
AMPHETAMINES UR QL: NEGATIVE
ANION GAP SERPL CALCULATED.3IONS-SCNC: 10.6 MMOL/L (ref 5–15)
AST SERPL-CCNC: 18 U/L (ref 1–32)
BARBITURATE INTERNAL CONTROL: ABNORMAL
BARBITURATES UR QL SCN: NEGATIVE
BENZODIAZ UR QL SCN: NEGATIVE
BENZODIAZEPINE INTERNAL CONTROL: ABNORMAL
BILIRUB SERPL-MCNC: 0.3 MG/DL (ref 0–1.2)
BUN SERPL-MCNC: 19 MG/DL (ref 8–23)
BUN/CREAT SERPL: 24.4 (ref 7–25)
BUPRENORPHINE INTERNAL CONTROL: ABNORMAL
BUPRENORPHINE SERPL-MCNC: NEGATIVE NG/ML
CALCIUM SPEC-SCNC: 8.9 MG/DL (ref 8.6–10.5)
CANNABINOIDS SERPL QL: NEGATIVE
CHLORIDE SERPL-SCNC: 103 MMOL/L (ref 98–107)
CO2 SERPL-SCNC: 26.4 MMOL/L (ref 22–29)
COCAINE INTERNAL CONTROL: ABNORMAL
COCAINE UR QL: NEGATIVE
CREAT SERPL-MCNC: 0.78 MG/DL (ref 0.57–1)
EGFRCR SERPLBLD CKD-EPI 2021: 81.8 ML/MIN/1.73
EXPIRATION DATE: ABNORMAL
GLOBULIN UR ELPH-MCNC: 2.8 GM/DL
GLUCOSE SERPL-MCNC: 105 MG/DL (ref 65–99)
Lab: ABNORMAL
MDMA (ECSTASY) INTERNAL CONTROL: ABNORMAL
MDMA UR QL SCN: NEGATIVE
METHADONE INTERNAL CONTROL: ABNORMAL
METHADONE UR QL SCN: NEGATIVE
METHAMPHETAMINE INTERNAL CONTROL: ABNORMAL
MORPHINE INTERNAL CONTROL: ABNORMAL
MORPHINE/OPIATES SCREEN, URINE: POSITIVE
NT-PROBNP SERPL-MCNC: 317 PG/ML (ref 0–900)
OXYCODONE INTERNAL CONTROL: ABNORMAL
OXYCODONE UR QL SCN: NEGATIVE
PCP UR QL SCN: NEGATIVE
PHENCYCLIDINE INTERNAL CONTROL: ABNORMAL
POTASSIUM SERPL-SCNC: 4.1 MMOL/L (ref 3.5–5.2)
PROT SERPL-MCNC: 6.9 G/DL (ref 6–8.5)
SODIUM SERPL-SCNC: 140 MMOL/L (ref 136–145)
THC INTERNAL CONTROL: ABNORMAL

## 2024-02-15 PROCEDURE — 36415 COLL VENOUS BLD VENIPUNCTURE: CPT

## 2024-02-15 PROCEDURE — 83880 ASSAY OF NATRIURETIC PEPTIDE: CPT

## 2024-02-15 PROCEDURE — 80053 COMPREHEN METABOLIC PANEL: CPT

## 2024-02-15 RX ORDER — SPIRONOLACTONE 25 MG/1
25 TABLET ORAL 2 TIMES DAILY
Qty: 60 TABLET | Refills: 2 | Status: SHIPPED | OUTPATIENT
Start: 2024-02-15

## 2024-02-15 RX ORDER — GABAPENTIN 600 MG/1
900 TABLET ORAL NIGHTLY
Qty: 90 TABLET | Refills: 2 | Status: SHIPPED | OUTPATIENT
Start: 2024-02-15

## 2024-02-27 ENCOUNTER — OFFICE VISIT (OUTPATIENT)
Dept: FAMILY MEDICINE CLINIC | Age: 69
End: 2024-02-27
Payer: MEDICARE

## 2024-02-27 VITALS
HEIGHT: 70 IN | OXYGEN SATURATION: 92 % | SYSTOLIC BLOOD PRESSURE: 141 MMHG | DIASTOLIC BLOOD PRESSURE: 86 MMHG | BODY MASS INDEX: 41.95 KG/M2 | WEIGHT: 293 LBS | HEART RATE: 56 BPM

## 2024-02-27 DIAGNOSIS — I50.33 ACUTE ON CHRONIC DIASTOLIC (CONGESTIVE) HEART FAILURE: ICD-10-CM

## 2024-02-27 DIAGNOSIS — I10 ESSENTIAL HYPERTENSION: ICD-10-CM

## 2024-02-27 DIAGNOSIS — F41.9 ANXIETY: ICD-10-CM

## 2024-02-27 DIAGNOSIS — I10 ESSENTIAL HYPERTENSION: Primary | ICD-10-CM

## 2024-02-27 DIAGNOSIS — E03.9 ACQUIRED HYPOTHYROIDISM: ICD-10-CM

## 2024-02-27 DIAGNOSIS — J30.2 SEASONAL ALLERGIC RHINITIS, UNSPECIFIED TRIGGER: ICD-10-CM

## 2024-02-27 DIAGNOSIS — F51.01 PRIMARY INSOMNIA: ICD-10-CM

## 2024-02-27 DIAGNOSIS — G62.89 OTHER POLYNEUROPATHY: ICD-10-CM

## 2024-02-27 DIAGNOSIS — R60.1 GENERALIZED EDEMA: ICD-10-CM

## 2024-02-27 DIAGNOSIS — I48.0 PAROXYSMAL ATRIAL FIBRILLATION: ICD-10-CM

## 2024-02-27 DIAGNOSIS — G25.81 RESTLESS LEG SYNDROME: ICD-10-CM

## 2024-02-27 DIAGNOSIS — F32.4 MAJOR DEPRESSIVE DISORDER WITH SINGLE EPISODE, IN PARTIAL REMISSION: ICD-10-CM

## 2024-02-27 RX ORDER — MONTELUKAST SODIUM 10 MG/1
10 TABLET ORAL NIGHTLY
Qty: 90 TABLET | Refills: 0 | Status: SHIPPED | OUTPATIENT
Start: 2024-02-27

## 2024-02-27 RX ORDER — DESVENLAFAXINE 100 MG/1
100 TABLET, EXTENDED RELEASE ORAL DAILY
Qty: 90 TABLET | Refills: 0 | Status: SHIPPED | OUTPATIENT
Start: 2024-02-27 | End: 2024-02-27 | Stop reason: SDUPTHER

## 2024-02-27 RX ORDER — GABAPENTIN 100 MG/1
CAPSULE ORAL
Qty: 90 CAPSULE | Refills: 0 | Status: SHIPPED | OUTPATIENT
Start: 2024-02-27 | End: 2024-03-19

## 2024-02-27 RX ORDER — DESVENLAFAXINE 100 MG/1
100 TABLET, EXTENDED RELEASE ORAL DAILY
Qty: 90 TABLET | Refills: 0 | Status: SHIPPED | OUTPATIENT
Start: 2024-02-27 | End: 2024-08-25

## 2024-02-27 RX ORDER — HYDROXYZINE HYDROCHLORIDE 10 MG/1
10 TABLET, FILM COATED ORAL 3 TIMES DAILY PRN
Qty: 90 TABLET | Refills: 0 | Status: SHIPPED | OUTPATIENT
Start: 2024-02-27 | End: 2024-02-27 | Stop reason: SDUPTHER

## 2024-02-27 RX ORDER — LEVOTHYROXINE SODIUM 0.2 MG/1
200 TABLET ORAL DAILY
Qty: 90 TABLET | Refills: 0 | Status: SHIPPED | OUTPATIENT
Start: 2024-02-27

## 2024-02-27 RX ORDER — GABAPENTIN 100 MG/1
CAPSULE ORAL
Qty: 90 CAPSULE | Refills: 0 | Status: SHIPPED | OUTPATIENT
Start: 2024-02-27 | End: 2024-02-27 | Stop reason: SDUPTHER

## 2024-02-27 RX ORDER — BUSPIRONE HYDROCHLORIDE 30 MG/1
30 TABLET ORAL 2 TIMES DAILY
Qty: 180 TABLET | Refills: 0 | Status: SHIPPED | OUTPATIENT
Start: 2024-02-27 | End: 2024-08-25

## 2024-02-27 RX ORDER — HYDROXYZINE HYDROCHLORIDE 10 MG/1
10 TABLET, FILM COATED ORAL 3 TIMES DAILY PRN
Qty: 90 TABLET | Refills: 0 | Status: SHIPPED | OUTPATIENT
Start: 2024-02-27

## 2024-02-27 RX ORDER — HYDROCHLOROTHIAZIDE 25 MG/1
25 TABLET ORAL DAILY
Qty: 90 TABLET | Refills: 0 | Status: SHIPPED | OUTPATIENT
Start: 2024-02-27

## 2024-02-27 RX ORDER — PRAMIPEXOLE DIHYDROCHLORIDE 0.25 MG/1
0.25 TABLET ORAL NIGHTLY
Qty: 90 TABLET | Refills: 0 | Status: SHIPPED | OUTPATIENT
Start: 2024-02-27

## 2024-02-27 NOTE — PROGRESS NOTES
Yulissa Spaulding presents to [facility].    Chief Complaint:    Subjective     History of Present Illness:  Hypertension  Pertinent negatives include no blurred vision, chest pain, palpitations or shortness of breath.     I am following up today with Yulissa for her lower extremity edema as well as diffuse allover edema and hypertension.  She has lost 12 pounds most likely in fluid weight and is doing great.  No signs of dehydration on exam today.  She recently had an achilles tendon rupture R ankle and status post surgical repair of the R achilles tendon on Dec 29th, outpt surgery with Dr Morris.  She has just become weightbearing and is really happy about this.  Her pain is well-controlled.  She does complain of some tingling in her toes and I recommend massaging of the feet.    She has history of a paroxysmal atrial fibrillation on Eliquis/amiodorone, diltiazem and is to F/u cardiology outpatient.      She has hypothyroidism and is on levothyroxine, TSH 0.834 on 12/23.      She is on chronic gabapentin for PN pain and I believe this could be causing her fluid retention so I am weaning her off of this medication.  The terrible leg muscle spasms and pain has resolved.      ECHO 2/5/24:  Normal left atrial pressure.  Biplane Campoverde's LVEF: 70%.  Systolic wall thickening is normal in all segments.  Normal LV diastolic wall dimensions.The left ventricular diastolic  function is normal.  BNP was normal    Result Review   The following data was reviewed by Rayne Gee MD on 02/15/2024.  Lab Results   Component Value Date    WBC 8.65 12/11/2023    HGB 13.4 12/11/2023    HCT 40.9 12/11/2023    MCV 93.8 12/11/2023     12/11/2023     Lab Results   Component Value Date    GLUCOSE 105 (H) 02/15/2024    BUN 19 02/15/2024    CREATININE 0.78 02/15/2024    EGFR 81.8 02/15/2024    BCR 24.4 02/15/2024    K 4.1 02/15/2024    CO2 26.4 02/15/2024    CALCIUM 8.9 02/15/2024    ALBUMIN 4.1  "02/15/2024    BILITOT 0.3 02/15/2024    AST 18 02/15/2024    ALT 18 02/15/2024     Lab Results   Component Value Date    CHOL 196 12/11/2023    CHLPL 176 10/14/2020    TRIG 88 12/11/2023    HDL 64 (H) 12/11/2023     (H) 12/11/2023     Lab Results   Component Value Date    TSH 0.834 12/11/2023     Lab Results   Component Value Date    HGBA1C 5.40 12/11/2023     No results found for: \"PSA\"      Lab Results   Component Value Date    SUVG28OI 83.9 12/27/2022               Assessment and Plan:   Diagnoses and all orders for this visit:    1. Essential hypertension (Primary)  Comments:  Mildly elevated,home BPs are good.Will cont HCTZ/spironolactone and Lasix as well as diltiazem.Her diffuse edema is improved,she real meds well. Avoid NA in diet  Orders:  -     levothyroxine (Synthroid) 200 MCG tablet; Take 1 tablet by mouth Daily.  Dispense: 90 tablet; Refill: 0  -     hydroCHLOROthiazide 25 MG tablet; Take 1 tablet by mouth Daily.  Dispense: 90 tablet; Refill: 0    2. Paroxysmal atrial fibrillation  Comments:  She is to follow-up with cardiology as directed.  She is in NSR today.  She is on Pacerone, diltiazem and Eliquis and tolerates meds well.    3. Acquired hypothyroidism  Comments:  Stable stable on current dose of levothyroxine. No changes needed current meds or Tx plan    4. Primary insomnia    5. Acute on chronic diastolic (congestive) heart failure  Comments:  stable, wt down 12#s, cont lasix, f/u cardiology as directed    6. Major depressive disorder with single episode, in partial remission  Comments:  stable on current meds, pristiq and buspar refilled. She is sleeping better    7. Anxiety  Comments:  worse, will add hydroxyzine and cont buspar and pristiq.  No changes needed in current medication treatment plan  Orders:  -     Discontinue: hydrOXYzine (ATARAX) 10 MG tablet; Take 1 tablet by mouth 3 (Three) Times a Day As Needed for Anxiety.  Dispense: 90 tablet; Refill: 0  -     Discontinue: " desvenlafaxine (Pristiq) 100 MG 24 hr tablet; Take 1 tablet by mouth Daily for 180 days. Indications: Major Depressive Disorder  Dispense: 90 tablet; Refill: 0  -     busPIRone (BUSPAR) 30 MG tablet; Take 1 tablet by mouth 2 (Two) Times a Day  Dispense: 180 tablet; Refill: 0  -     hydrOXYzine (ATARAX) 10 MG tablet; Take 1 tablet by mouth 3 (Three) Times a Day As Needed for Anxiety.  Dispense: 90 tablet; Refill: 0  -     desvenlafaxine (Pristiq) 100 MG 24 hr tablet; Take 1 tablet by mouth Daily  Dispense: 90 tablet; Refill: 0    8. Other polyneuropathy  Comments:  will continue to wean her off the gabapentin  Orders:  -     Discontinue: gabapentin (NEURONTIN) 100 MG capsule; 2 pill bid x 1 week, then 1 pill bid x 1 week, then 1 pill once a day x 1 week then stop  Dispense: 90 capsule; Refill: 0  -     gabapentin (NEURONTIN) 100 MG capsule; Take 2 capsules by mouth 2 (Two) Times a Day for 7 days, THEN 1 capsule 2 (Two) Times a Day for 7 days, THEN 1 capsule Daily for 7 days then stop.  Dispense: 90 capsule; Refill: 0    9. Restless leg syndrome  Comments:  much better and stable on Mirapex  Orders:  -     pramipexole (Mirapex) 0.25 MG tablet; Take 1 tablet by mouth Every Night.  Dispense: 90 tablet; Refill: 0    10. Seasonal allergic rhinitis, unspecified trigger  Comments:  Stable with current medications.  No changes needed in current treatment plan.  Singulair refilled  Orders:  -     montelukast (SINGULAIR) 10 MG tablet; Take 1 tablet by mouth Every Night.  Dispense: 90 tablet; Refill: 0    11. Essential hypertension  Comments:  Borderline.  Continue current meds and she is to check her blood pressures at home and call if greater than 140/90  Orders:  -     levothyroxine (Synthroid) 200 MCG tablet; Take 1 tablet by mouth Daily.  Dispense: 90 tablet; Refill: 0  -     hydroCHLOROthiazide 25 MG tablet; Take 1 tablet by mouth Daily.  Dispense: 90 tablet; Refill: 0    12. Generalized edema  Comments:  cont lasix,  avoid Na in diet        I question the cause of her allover edema.  She is definitely getting worse and gaining weight and has edema in the trunk area as well as lower extremity.  Her lungs are clear on exam.  Will recheck BNP which was essentially normal when she was in the hospital so I am not convinced this is acute on chronic congestive heart failure.  Will stop potassium and add spironolactone to see if we can help with her fluid overload.  I also suspect this may be a gabapentin so I am going to wean her down off the gabapentin to 600 mg twice daily x 1 week and then 300 mg twice daily x 1 week and then stop.  She has A-fib and is in normal sinus rhythm today.  Loop recorder is in place and she is to follow-up with cardiology as directed and continue all current meds.  Her TSH level was normal when recently checked in December 2023.  Her blood pressure stable and well-controlled so no changes needed current meds or Tx plan.  She is to avoid all sodium in her diet.  She does not complain of acute cough so I have checked a SARS and flu test since she was in the hospital and highly exposed        Objective     Medications:  Current Outpatient Medications   Medication Instructions    amiodarone (PACERONE) 200 MG tablet 1 tablet, Oral, 2 Times Daily    apixaban (ELIQUIS) 5 MG tablet tablet 1 tablet, Oral, 2 Times Daily    busPIRone (BUSPAR) 30 MG tablet Take 1 tablet by mouth 2 (Two) Times a Day    cetirizine (ZYRTEC) 10 mg, Oral, Daily    desvenlafaxine (Pristiq) 100 MG 24 hr tablet Take 1 tablet by mouth Daily    dilTIAZem (TIAZAC) 180 mg, Oral, Daily    furosemide (LASIX) 40 mg, Oral, 2 Times Daily    gabapentin (NEURONTIN) 100 MG capsule Take 2 capsules by mouth 2 (Two) Times a Day for 7 days, THEN 1 capsule 2 (Two) Times a Day for 7 days, THEN 1 capsule Daily for 7 days then stop.    hydroCHLOROthiazide 25 mg, Oral, Daily    HYDROcodone-acetaminophen (NORCO) 5-325 MG per tablet 1 tablet, Oral, Every 6 Hours  "PRN    hydrOXYzine (ATARAX) 10 mg, Oral, 3 Times Daily PRN    levothyroxine (SYNTHROID) 200 mcg, Oral, Daily    MELATONIN PO 1 tablet, Oral, Nightly PRN    Menthol (Biofreeze) 5 % patch 1 patch, Apply externally, Nightly    montelukast (SINGULAIR) 10 mg, Oral, Nightly    ondansetron (ZOFRAN) 4 mg, Oral, Every 8 Hours PRN    pantoprazole (PROTONIX) 20 mg, Oral, Daily    pramipexole (MIRAPEX) 0.25 mg, Oral, Nightly    spironolactone (ALDACTONE) 25 mg, Oral, 2 Times Daily    vitamin D3 5,000 Units, Oral, Daily        Vital Signs:   /86 (BP Location: Right arm, Patient Position: Sitting)   Pulse 56   Ht 177.8 cm (70\")   Wt (!) 137 kg (303 lb)   SpO2 92%   BMI 43.48 kg/m²             Physical Exam:  Physical Exam  Vitals and nursing note reviewed.   Constitutional:       General: She is not in acute distress.     Appearance: Normal appearance. She is not ill-appearing, toxic-appearing or diaphoretic.   HENT:      Head: Normocephalic and atraumatic.      Right Ear: Tympanic membrane, ear canal and external ear normal.      Left Ear: Tympanic membrane, ear canal and external ear normal.      Nose: No congestion or rhinorrhea.      Mouth/Throat:      Mouth: Mucous membranes are moist.      Pharynx: Oropharynx is clear. No oropharyngeal exudate or posterior oropharyngeal erythema.   Eyes:      Extraocular Movements: Extraocular movements intact.      Conjunctiva/sclera: Conjunctivae normal.      Pupils: Pupils are equal, round, and reactive to light.   Cardiovascular:      Rate and Rhythm: Normal rate and regular rhythm.      Heart sounds: Normal heart sounds.   Pulmonary:      Effort: Pulmonary effort is normal.      Breath sounds: Normal breath sounds. No wheezing, rhonchi or rales.   Abdominal:      General: Abdomen is flat.      Palpations: Abdomen is soft. There is no mass.      Tenderness: There is no abdominal tenderness.      Hernia: No hernia is present.   Musculoskeletal:      Cervical back: Neck supple. " No rigidity.      Right lower leg: No edema.      Left lower leg: No edema.   Lymphadenopathy:      Cervical: No cervical adenopathy.   Skin:     General: Skin is warm and dry.      Comments: Edema noted in the soft tissue of the buttocks and abdominal as well as lower extremity bilaterally, nonpitting   Neurological:      General: No focal deficit present.      Mental Status: She is alert and oriented to person, place, and time. Mental status is at baseline.   Psychiatric:         Mood and Affect: Mood normal.         Behavior: Behavior normal.         Thought Content: Thought content normal.         Judgment: Judgment normal.           Review of Systems:  Review of Systems   Constitutional:  Positive for fatigue. Negative for chills and fever.   HENT:  Negative for ear pain, sinus pressure and sore throat.    Eyes:  Negative for blurred vision and double vision.   Respiratory:  Positive for cough. Negative for shortness of breath and wheezing.    Cardiovascular:  Positive for leg swelling. Negative for chest pain and palpitations.   Gastrointestinal:  Negative for abdominal pain, blood in stool, constipation, diarrhea, nausea and vomiting.   Musculoskeletal:  Positive for myalgias.   Skin:  Negative for rash.   Neurological:  Negative for dizziness and headache.   Psychiatric/Behavioral:  Positive for depressed mood. Negative for sleep disturbance and suicidal ideas. The patient is not nervous/anxious.               Follow Up   Return in about 3 months (around 5/27/2024) for Recheck.    Part of this note may be an electronic transcription/translation of spoken language to printed   text using the Dragon Dictation System.            Medical History:  Medications Discontinued During This Encounter   Medication Reason    gabapentin (Neurontin) 600 MG tablet     busPIRone (BUSPAR) 30 MG tablet Reorder    desvenlafaxine (Pristiq) 100 MG 24 hr tablet Reorder    hydroCHLOROthiazide (HYDRODIURIL) 25 MG tablet Reorder     levothyroxine (Synthroid) 200 MCG tablet Reorder    montelukast (SINGULAIR) 10 MG tablet Reorder    pramipexole (Mirapex) 0.25 MG tablet Reorder    gabapentin (NEURONTIN) 100 MG capsule Reorder    hydrOXYzine (ATARAX) 10 MG tablet Reorder    desvenlafaxine (Pristiq) 100 MG 24 hr tablet Reorder        Past Medical History:    Anxiety    Atrial fibrillation    Chronic pain disorder    Depression    Disease of thyroid gland    Mood disorder    Obsessive-compulsive disorder    Panic disorder    PONV (postoperative nausea and vomiting)    Psychiatric illness    Self-injurious behavior    patient says she picks at her skin when nervous     Past Surgical History:    BREAST SURGERY    tumor removed    CARDIAC SURGERY    installed LOOP recorder    CHOLECYSTECTOMY    COLONOSCOPY    Procedure: COLONOSCOPY WITH POLYPECTOMIES;  Surgeon: Eun Gasca MD;  Location: [facility] END[facility]COPY;  Service: Gastroenterology;  Laterality: N/A;  COLON POLYPS    ENDOSCOPY    Procedure: ESOPHAGOGASTRODUODENOSCOPY WITH BIOPSIES;  Surgeon: Eun Gasca MD;  Location: [facility] ENDOSCOPY;  Service: Gastroenterology;  Laterality: N/A;  GASTRITIS, HIATAL HERNIA    GASTRIC SLEEVE LAPAROSCOPIC    HERNIA REPAIR    HYSTERECTOMY    REPLACEMENT TOTAL KNEE      Family History   Problem Relation Age of Onset    Anxiety disorder Mother     Dementia Mother     Depression Mother     Anxiety disorder Sister     Depression Sister     Colon cancer Maternal Aunt     Colon cancer Daughter      Social History     Tobacco Use    Smoking status: Never    Smokeless tobacco: Never   Substance Use Topics    Alcohol use: Yes     Comment: social        Health Maintenance Due   Topic Date Due    ZOSTER VACCINE (1 of 2) Never done    Hepatitis B (1 of 3 - Risk 3-dose series) Never done    COVID-19 Vaccine (5 - 2023-24 season) 09/01/2023        Immunization History   Administered Date(s) Administered    COVID-19 (MODERNA) 1st,2nd,3rd Dose Monovalent 03/22/2021,  04/19/2021, 01/28/2022    COVID-19 (PFIZER) BIVALENT 12+YRS 12/27/2022    Fluad Quad 65+ 09/02/2023    Fluzone (or Fluarix & Flulaval for VFC) >6mos 10/13/2022    Fluzone High Dose =>65 Years ([employer] ONLY) 10/13/2020, 08/25/2021    Fluzone High-Dose 65+yrs 08/25/2021    Pneumococcal Conjugate 13-Valent (PCV13) 01/14/2019    Pneumococcal Polysaccharide (PPSV23) 06/22/2021       Allergies   Allergen Reactions    Atorvastatin Myalgia     Joint pain    Methylmethacrylate Swelling and Other (See Comments)     (bone cement)      Adhesive Tape Rash     Off-brand bandaids

## 2024-03-07 DIAGNOSIS — J30.2 SEASONAL ALLERGIC RHINITIS, UNSPECIFIED TRIGGER: ICD-10-CM

## 2024-03-07 DIAGNOSIS — F41.9 ANXIETY: ICD-10-CM

## 2024-03-07 DIAGNOSIS — I10 ESSENTIAL HYPERTENSION: ICD-10-CM

## 2024-03-07 RX ORDER — HYDROCHLOROTHIAZIDE 25 MG/1
25 TABLET ORAL DAILY
Qty: 90 TABLET | Refills: 0 | Status: SHIPPED | OUTPATIENT
Start: 2024-03-07

## 2024-03-07 RX ORDER — BUSPIRONE HYDROCHLORIDE 30 MG/1
30 TABLET ORAL 2 TIMES DAILY
Qty: 180 TABLET | Refills: 0 | Status: SHIPPED | OUTPATIENT
Start: 2024-03-07 | End: 2024-09-03

## 2024-03-07 RX ORDER — MONTELUKAST SODIUM 10 MG/1
10 TABLET ORAL NIGHTLY
Qty: 90 TABLET | Refills: 0 | Status: SHIPPED | OUTPATIENT
Start: 2024-03-07

## 2024-03-07 RX ORDER — LEVOTHYROXINE SODIUM 0.2 MG/1
200 TABLET ORAL DAILY
Qty: 90 TABLET | Refills: 0 | Status: SHIPPED | OUTPATIENT
Start: 2024-03-07

## 2024-03-15 ENCOUNTER — TRANSCRIBE ORDERS (OUTPATIENT)
Dept: ADMINISTRATIVE | Facility: HOSPITAL | Age: 69
End: 2024-03-15
Payer: OTHER GOVERNMENT

## 2024-03-15 DIAGNOSIS — Z12.31 SCREENING MAMMOGRAM, ENCOUNTER FOR: Primary | ICD-10-CM

## 2024-03-15 DIAGNOSIS — F41.9 ANXIETY: ICD-10-CM

## 2024-03-15 RX ORDER — HYDROXYZINE HYDROCHLORIDE 10 MG/1
10 TABLET, FILM COATED ORAL 3 TIMES DAILY PRN
Qty: 90 TABLET | Refills: 0 | OUTPATIENT
Start: 2024-03-15

## 2024-03-21 DIAGNOSIS — F41.9 ANXIETY: ICD-10-CM

## 2024-03-21 DIAGNOSIS — R60.1 GENERALIZED EDEMA: ICD-10-CM

## 2024-03-21 RX ORDER — FUROSEMIDE 20 MG/1
40 TABLET ORAL 2 TIMES DAILY
Qty: 56 TABLET | Refills: 0 | Status: CANCELLED | OUTPATIENT
Start: 2024-03-21 | End: 2024-04-04

## 2024-03-21 RX ORDER — HYDROXYZINE HYDROCHLORIDE 10 MG/1
10 TABLET, FILM COATED ORAL 3 TIMES DAILY PRN
Qty: 90 TABLET | Refills: 0 | Status: CANCELLED | OUTPATIENT
Start: 2024-03-21

## 2024-03-22 RX ORDER — PANTOPRAZOLE SODIUM 20 MG/1
20 TABLET, DELAYED RELEASE ORAL DAILY
Qty: 30 TABLET | Refills: 1 | Status: SHIPPED | OUTPATIENT
Start: 2024-03-22

## 2024-04-12 ENCOUNTER — HOSPITAL ENCOUNTER (OUTPATIENT)
Dept: MAMMOGRAPHY | Facility: HOSPITAL | Age: 69
Discharge: HOME | End: 2024-04-12
Admitting: FAMILY MEDICINE
Payer: MEDICARE

## 2024-04-12 DIAGNOSIS — Z12.31 SCREENING MAMMOGRAM, ENCOUNTER FOR: ICD-10-CM

## 2024-04-12 DIAGNOSIS — R92.8 ABNORMAL MAMMOGRAM OF RIGHT BREAST: Primary | ICD-10-CM

## 2024-04-12 PROCEDURE — 77067 SCR MAMMO BI INCL CAD: CPT

## 2024-04-12 PROCEDURE — 77063 BREAST TOMOSYNTHESIS BI: CPT

## 2024-04-19 ENCOUNTER — HOSPITAL ENCOUNTER (OUTPATIENT)
Dept: ULTRASOUND IMAGING | Facility: HOSPITAL | Age: 69
Discharge: HOME | End: 2024-04-19
Payer: MEDICARE

## 2024-04-19 ENCOUNTER — HOSPITAL ENCOUNTER (OUTPATIENT)
Dept: MAMMOGRAPHY | Facility: HOSPITAL | Age: 69
Discharge: HOME | End: 2024-04-19
Payer: MEDICARE

## 2024-04-19 DIAGNOSIS — R92.8 ABNORMAL MAMMOGRAM OF RIGHT BREAST: ICD-10-CM

## 2024-04-19 PROCEDURE — 77065 DX MAMMO INCL CAD UNI: CPT

## 2024-04-19 PROCEDURE — 76642 ULTRASOUND BREAST LIMITED: CPT

## 2024-04-19 PROCEDURE — G0279 TOMOSYNTHESIS, MAMMO: HCPCS

## 2024-04-23 ENCOUNTER — TELEPHONE (OUTPATIENT)
Dept: FAMILY MEDICINE CLINIC | Age: 69
End: 2024-04-23
Payer: OTHER GOVERNMENT

## 2024-04-23 NOTE — TELEPHONE ENCOUNTER
PATIENT STATES THAT SHE IS NEEDING PAPERWORK FILLED OUT TO CLEAR HER FOR AN UPCOMING SURGERY. PLEASE LET PATIENT KNOW IF SHE NEEDS AN APPOINTMENT TO HAVE PAPERS FILLED OUT.

## 2024-05-03 DIAGNOSIS — I50.33 ACUTE ON CHRONIC DIASTOLIC (CONGESTIVE) HEART FAILURE: ICD-10-CM

## 2024-05-03 DIAGNOSIS — I10 ESSENTIAL HYPERTENSION: ICD-10-CM

## 2024-05-03 RX ORDER — SPIRONOLACTONE 25 MG/1
25 TABLET ORAL 2 TIMES DAILY
Qty: 60 TABLET | Refills: 2 | Status: SHIPPED | OUTPATIENT
Start: 2024-05-03

## 2024-05-03 RX ORDER — HYDROCHLOROTHIAZIDE 25 MG/1
25 TABLET ORAL DAILY
Qty: 90 TABLET | Refills: 0 | OUTPATIENT
Start: 2024-05-03

## 2024-05-03 NOTE — TELEPHONE ENCOUNTER
Caller: Yulissa Spaulding    Relationship: Self    Best call back number: [phone]     Requested Prescriptions:   Requested Prescriptions     Pending Prescriptions Disp Refills    hydroCHLOROthiazide 25 MG tablet 90 tablet 0     Sig: Take 1 tablet by mouth Daily.        Pharmacy where request should be sent: [facility]     Last office visit with prescribing clinician: 2/27/2024   Last telemedicine visit with prescribing clinician: Visit date not found   Next office visit with prescribing clinician: 5/28/2024     Does the patient have less than a 3 day supply:  [] Yes  [x] No    Rosi Summers Rep   05/03/24 09:14 EDT

## 2024-05-06 ENCOUNTER — HOSPITAL ENCOUNTER (OUTPATIENT)
Dept: MAMMOGRAPHY | Facility: HOSPITAL | Age: 69
Discharge: HOME | End: 2024-05-06
Payer: MEDICARE

## 2024-05-06 ENCOUNTER — TELEPHONE (OUTPATIENT)
Dept: FAMILY MEDICINE CLINIC | Age: 69
End: 2024-05-06
Payer: OTHER GOVERNMENT

## 2024-05-06 ENCOUNTER — PATIENT OUTREACH (OUTPATIENT)
Dept: ONCOLOGY | Facility: HOSPITAL | Age: 69
End: 2024-05-06
Payer: OTHER GOVERNMENT

## 2024-05-06 DIAGNOSIS — N63.41 SUBAREOLAR MASS OF RIGHT BREAST: ICD-10-CM

## 2024-05-06 PROCEDURE — 88360 TUMOR IMMUNOHISTOCHEM/MANUAL: CPT | Performed by: FAMILY MEDICINE

## 2024-05-06 PROCEDURE — A4648 IMPLANTABLE TISSUE MARKER: HCPCS

## 2024-05-06 PROCEDURE — 88305 TISSUE EXAM BY PATHOLOGIST: CPT | Performed by: FAMILY MEDICINE

## 2024-05-06 PROCEDURE — 25010000002 LIDOCAINE 1 % SOLUTION

## 2024-05-06 PROCEDURE — 88342 IMHCHEM/IMCYTCHM 1ST ANTB: CPT | Performed by: FAMILY MEDICINE

## 2024-05-06 RX ORDER — LIDOCAINE HYDROCHLORIDE 10 MG/ML
INJECTION, SOLUTION INFILTRATION; PERINEURAL
Status: COMPLETED
Start: 2024-05-06 | End: 2024-05-06

## 2024-05-06 RX ORDER — LIDOCAINE HYDROCHLORIDE AND EPINEPHRINE 10; 10 MG/ML; UG/ML
INJECTION, SOLUTION INFILTRATION; PERINEURAL
Status: COMPLETED
Start: 2024-05-06 | End: 2024-05-06

## 2024-05-06 RX ADMIN — LIDOCAINE HYDROCHLORIDE: 10 INJECTION, SOLUTION INFILTRATION; PERINEURAL at 11:54

## 2024-05-06 RX ADMIN — LIDOCAINE HYDROCHLORIDE,EPINEPHRINE BITARTRATE: 10; .01 INJECTION, SOLUTION INFILTRATION; PERINEURAL at 11:54

## 2024-05-07 ENCOUNTER — TELEPHONE (OUTPATIENT)
Dept: FAMILY MEDICINE CLINIC | Age: 69
End: 2024-05-07
Payer: OTHER GOVERNMENT

## 2024-05-07 LAB
CYTO UR: NORMAL
LAB AP CASE REPORT: NORMAL
LAB AP CLINICAL INFORMATION: NORMAL
LAB AP SPECIAL STAINS: NORMAL
PATH REPORT.FINAL DX SPEC: NORMAL
PATH REPORT.GROSS SPEC: NORMAL

## 2024-05-09 ENCOUNTER — OFFICE VISIT (OUTPATIENT)
Dept: FAMILY MEDICINE CLINIC | Age: 69
End: 2024-05-09
Payer: MEDICARE

## 2024-05-09 VITALS
HEIGHT: 70 IN | HEART RATE: 64 BPM | OXYGEN SATURATION: 96 % | SYSTOLIC BLOOD PRESSURE: 110 MMHG | BODY MASS INDEX: 41.95 KG/M2 | WEIGHT: 293 LBS | DIASTOLIC BLOOD PRESSURE: 65 MMHG

## 2024-05-09 DIAGNOSIS — Z79.899 HIGH RISK MEDICATION USE: ICD-10-CM

## 2024-05-09 DIAGNOSIS — F41.9 ANXIETY: ICD-10-CM

## 2024-05-09 DIAGNOSIS — J30.2 SEASONAL ALLERGIC RHINITIS, UNSPECIFIED TRIGGER: ICD-10-CM

## 2024-05-09 DIAGNOSIS — E03.9 ACQUIRED HYPOTHYROIDISM: ICD-10-CM

## 2024-05-09 DIAGNOSIS — C50.919 INVASIVE CARCINOMA OF BREAST: Primary | ICD-10-CM

## 2024-05-09 DIAGNOSIS — J40 BRONCHITIS: ICD-10-CM

## 2024-05-09 DIAGNOSIS — J01.00 ACUTE NON-RECURRENT MAXILLARY SINUSITIS: ICD-10-CM

## 2024-05-09 DIAGNOSIS — R60.1 GENERALIZED EDEMA: ICD-10-CM

## 2024-05-09 DIAGNOSIS — I48.0 PAROXYSMAL ATRIAL FIBRILLATION: ICD-10-CM

## 2024-05-09 DIAGNOSIS — R21 RASH AND NONSPECIFIC SKIN ERUPTION: ICD-10-CM

## 2024-05-09 LAB
AMPHET+METHAMPHET UR QL: NEGATIVE
AMPHETAMINES UR QL: NEGATIVE
BARBITURATES UR QL SCN: NEGATIVE
BENZODIAZ UR QL SCN: NEGATIVE
BUPRENORPHINE SERPL-MCNC: NEGATIVE NG/ML
CANNABINOIDS SERPL QL: POSITIVE
COCAINE UR QL: NEGATIVE
EXPIRATION DATE: ABNORMAL
EXPIRATION DATE: NORMAL
FLUAV AG UPPER RESP QL IA.RAPID: NOT DETECTED
FLUBV AG UPPER RESP QL IA.RAPID: NOT DETECTED
INTERNAL CONTROL: NORMAL
Lab: ABNORMAL
Lab: NORMAL
MDMA UR QL SCN: NEGATIVE
METHADONE UR QL SCN: NEGATIVE
MORPHINE/OPIATES SCREEN, URINE: NEGATIVE
OXYCODONE UR QL SCN: NEGATIVE
PCP UR QL SCN: NEGATIVE
SARS-COV-2 AG UPPER RESP QL IA.RAPID: NOT DETECTED

## 2024-05-09 PROCEDURE — G2211 COMPLEX E/M VISIT ADD ON: HCPCS | Performed by: FAMILY MEDICINE

## 2024-05-09 PROCEDURE — 3074F SYST BP LT 130 MM HG: CPT | Performed by: FAMILY MEDICINE

## 2024-05-09 PROCEDURE — 99214 OFFICE O/P EST MOD 30 MIN: CPT | Performed by: FAMILY MEDICINE

## 2024-05-09 PROCEDURE — 1159F MED LIST DOCD IN RCRD: CPT | Performed by: FAMILY MEDICINE

## 2024-05-09 PROCEDURE — 1160F RVW MEDS BY RX/DR IN RCRD: CPT | Performed by: FAMILY MEDICINE

## 2024-05-09 PROCEDURE — 80305 DRUG TEST PRSMV DIR OPT OBS: CPT | Performed by: FAMILY MEDICINE

## 2024-05-09 PROCEDURE — 3078F DIAST BP <80 MM HG: CPT | Performed by: FAMILY MEDICINE

## 2024-05-09 PROCEDURE — 87428 SARSCOV & INF VIR A&B AG IA: CPT | Performed by: FAMILY MEDICINE

## 2024-05-09 RX ORDER — AMOXICILLIN 875 MG/1
875 TABLET, COATED ORAL 2 TIMES DAILY
Qty: 20 TABLET | Refills: 0 | Status: SHIPPED | OUTPATIENT
Start: 2024-05-09 | End: 2024-05-19

## 2024-05-09 RX ORDER — DEXTROMETHORPHAN HYDROBROMIDE AND PROMETHAZINE HYDROCHLORIDE 15; 6.25 MG/5ML; MG/5ML
5 SYRUP ORAL 4 TIMES DAILY PRN
Qty: 180 ML | Refills: 0 | Status: SHIPPED | OUTPATIENT
Start: 2024-05-09

## 2024-05-09 RX ORDER — DESVENLAFAXINE 100 MG/1
100 TABLET, EXTENDED RELEASE ORAL DAILY
Qty: 90 TABLET | Refills: 0 | Status: SHIPPED | OUTPATIENT
Start: 2024-05-09 | End: 2024-11-05

## 2024-05-09 RX ORDER — POTASSIUM CHLORIDE 750 MG/1
10 TABLET, EXTENDED RELEASE ORAL
COMMUNITY

## 2024-05-09 RX ORDER — CLONAZEPAM 0.5 MG/1
0.5 TABLET ORAL 3 TIMES DAILY PRN
Qty: 60 TABLET | Refills: 2 | Status: SHIPPED | OUTPATIENT
Start: 2024-05-09

## 2024-05-09 RX ORDER — CLONAZEPAM 0.5 MG/1
0.5 TABLET ORAL
COMMUNITY
End: 2024-05-09 | Stop reason: SDUPTHER

## 2024-05-10 ENCOUNTER — TELEPHONE (OUTPATIENT)
Dept: FAMILY MEDICINE CLINIC | Age: 69
End: 2024-05-10
Payer: OTHER GOVERNMENT

## 2024-05-10 ENCOUNTER — PATIENT OUTREACH (OUTPATIENT)
Dept: ONCOLOGY | Facility: HOSPITAL | Age: 69
End: 2024-05-10
Payer: OTHER GOVERNMENT

## 2024-05-13 DIAGNOSIS — J40 BRONCHITIS: Primary | ICD-10-CM

## 2024-05-13 RX ORDER — FLUTICASONE FUROATE AND VILANTEROL 100; 25 UG/1; UG/1
1 POWDER RESPIRATORY (INHALATION)
Qty: 60 EACH | Refills: 0 | Status: SHIPPED | OUTPATIENT
Start: 2024-05-13

## 2024-05-13 RX ORDER — ALBUTEROL SULFATE 2.5 MG/3ML
2.5 SOLUTION RESPIRATORY (INHALATION) EVERY 6 HOURS PRN
Qty: 75 ML | Refills: 0 | Status: SHIPPED | OUTPATIENT
Start: 2024-05-13

## 2024-05-14 LAB
CYTO UR: NORMAL
LAB AP CASE REPORT: NORMAL
LAB AP CLINICAL INFORMATION: NORMAL
LAB AP SPECIAL STAINS: NORMAL
PATH REPORT.ADDENDUM SPEC: NORMAL
PATH REPORT.FINAL DX SPEC: NORMAL
PATH REPORT.GROSS SPEC: NORMAL

## 2024-05-15 ENCOUNTER — TELEPHONE (OUTPATIENT)
Dept: FAMILY MEDICINE CLINIC | Age: 69
End: 2024-05-15
Payer: OTHER GOVERNMENT

## 2024-05-15 DIAGNOSIS — C50.919 INVASIVE CARCINOMA OF BREAST: Primary | ICD-10-CM

## 2024-05-15 NOTE — TELEPHONE ENCOUNTER
As of right now there is not an Oncology referral in the patient's chart. Dr. Gee- if agreeable, please place order. Thanks /mlb

## 2024-05-15 NOTE — TELEPHONE ENCOUNTER
As below she does not want Dr James she wants as below :She is going to [facility] [address] phone [phone] DR Guille Garcia.

## 2024-05-15 NOTE — TELEPHONE ENCOUNTER
Pt does not want to go with [facility] . She is going to [facility] [place] [address] phone [phone] DR Guille Garcia. Dr Goode will you please place a referral and Taylor will set up appt please .

## 2024-05-16 DIAGNOSIS — G25.81 RESTLESS LEG SYNDROME: ICD-10-CM

## 2024-05-16 DIAGNOSIS — R60.1 GENERALIZED EDEMA: ICD-10-CM

## 2024-05-16 RX ORDER — PRAMIPEXOLE DIHYDROCHLORIDE 0.25 MG/1
0.25 TABLET ORAL NIGHTLY
Qty: 90 TABLET | Refills: 0 | Status: SHIPPED | OUTPATIENT
Start: 2024-05-16

## 2024-05-16 RX ORDER — FUROSEMIDE 20 MG/1
20 TABLET ORAL DAILY
Qty: 90 TABLET | Refills: 0 | Status: SHIPPED | OUTPATIENT
Start: 2024-05-16

## 2024-06-06 ENCOUNTER — TELEPHONE (OUTPATIENT)
Dept: VASCULAR SURGERY | Facility: HOSPITAL | Age: 69
End: 2024-06-06
Payer: OTHER GOVERNMENT

## 2024-06-06 NOTE — TELEPHONE ENCOUNTER
CALLED PATIENT'S DAUGHTER TO SET UP APPOINTMENT WITH OUR SURGEON TO BE EVALUATED. LEFT VOICE MESSAGE

## 2024-06-14 RX ORDER — PANTOPRAZOLE SODIUM 20 MG/1
20 TABLET, DELAYED RELEASE ORAL DAILY
Qty: 30 TABLET | Refills: 1 | Status: SHIPPED | OUTPATIENT
Start: 2024-06-14

## 2024-08-08 ENCOUNTER — TRANSCRIBE ORDERS (OUTPATIENT)
Age: 69
End: 2024-08-08
Payer: OTHER GOVERNMENT

## 2024-08-08 DIAGNOSIS — I87.2 VENOUS (PERIPHERAL) INSUFFICIENCY: Primary | ICD-10-CM

## 2024-08-19 LAB
CYTO UR: NORMAL
LAB AP CASE REPORT: NORMAL
LAB AP CLINICAL INFORMATION: NORMAL
LAB AP SPECIAL STAINS: NORMAL
Lab: NORMAL
PATH REPORT.ADDENDUM SPEC: NORMAL
PATH REPORT.FINAL DX SPEC: NORMAL
PATH REPORT.GROSS SPEC: NORMAL

## 2024-08-21 ENCOUNTER — OFFICE VISIT (OUTPATIENT)
Age: 69
End: 2024-08-21
Payer: MEDICARE

## 2024-08-21 VITALS — WEIGHT: 293 LBS | BODY MASS INDEX: 41.95 KG/M2 | HEIGHT: 70 IN

## 2024-08-21 DIAGNOSIS — I87.333 CHRONIC VENOUS HYPERTENSION WITH ULCER AND INFLAMMATION INVOLVING BOTH SIDES: ICD-10-CM

## 2024-08-21 DIAGNOSIS — I87.2 VENOUS (PERIPHERAL) INSUFFICIENCY: ICD-10-CM

## 2024-08-21 DIAGNOSIS — I87.8 VENOUS STASIS: Primary | ICD-10-CM

## 2024-08-21 DIAGNOSIS — I89.0 LYMPH EDEMA: ICD-10-CM

## 2024-08-21 PROBLEM — I87.339 CHRONIC VENOUS HYPERTENSION WITH ULCER AND INFLAMMATION: Status: ACTIVE | Noted: 2024-08-21

## 2024-08-21 NOTE — PROGRESS NOTES
Patient Name: Yulissa Spaulding    MRN: [id number] Encounter Date: 08/21/2024      Consulting Service: Vascular Surgery    Referring Provider: Kasi Cortés MD       CHIEF COMPLAINT:  Chief Complaint   Patient presents with    New Patient      Bilateral lower extremity varicose veins with edema.        Subjective    HPI: Yulissa Spaulding is a 68 y.o. female is being seen for evaluation/management of complaints of symptomatic varicose veins, venous insufficiency, and lower extremity edema of bilateral lower extremity.   Symptoms include Pedroza symptoms: Edema/Swelling, Varicose veins, Cramping, Rash/Irritation, Pigmentation, Bulging veins, Heaviness/Fullness, and Throbbing.   Patient has positive family history of the varicose veins and negative family history of DVT.  At this point time attempts at symptomatic control using elevation, compression and nonsteroidals have been been attempted.  Prior prior venous interventions  include  none .    PAST MEDICAL HISTORY:   Past Medical History:   Diagnosis Date    Anxiety     Atrial fibrillation     Chronic pain disorder     Depression     Disease of thyroid gland     Mood disorder 02/01/2022    Obsessive-compulsive disorder     Panic disorder     PONV (postoperative nausea and vomiting)     Psychiatric illness     Self-injurious behavior     patient says she picks at her skin when nervous      PAST SURGICAL HISTORY:   Past Surgical History:   Procedure Laterality Date    BREAST SURGERY Left     tumor removed    CARDIAC SURGERY      installed LOOP recorder    CHOLECYSTECTOMY      COLONOSCOPY N/A 12/11/2023    Procedure: COLONOSCOPY WITH POLYPECTOMIES;  Surgeon: Eun Gasca MD;  Location: [facility] ENDOSCOPY;  Service: Gastroenterology;  Laterality: N/A;  COLON POLYPS    ENDOSCOPY N/A 12/11/2023    Procedure: ESOPHAGOGASTRODUODENOSCOPY WITH BIOPSIES;  Surgeon: Eun Gasca MD;  Location: [facility] ENDOSCOPY;  Service: Gastroenterology;  Laterality: N/A;   GASTRITIS, HIATAL HERNIA    GASTRIC SLEEVE LAPAROSCOPIC      HERNIA REPAIR      HYSTERECTOMY      REPLACEMENT TOTAL KNEE Left       FAMILY HISTORY:   Family History   Problem Relation Age of Onset    Anxiety disorder Mother     Dementia Mother     Depression Mother     Anxiety disorder Sister     Depression Sister     Colon cancer Maternal Aunt     Colon cancer Daughter       SOCIAL HISTORY:   Social History     Tobacco Use    Smoking status: Never    Smokeless tobacco: Never   Vaping Use    Vaping status: Never Used   Substance Use Topics    Alcohol use: Yes     Comment: social     Drug use: Never      MEDICATIONS:   Current Outpatient Medications on File Prior to Visit   Medication Sig Dispense Refill    Acetaminophen (Tylenol) 325 MG capsule Take 1,000 mg by mouth.      albuterol (PROVENTIL) (2.5 MG/3ML) 0.083% nebulizer solution Inhale 1 vial by nebulization Every 6 (Six) Hours As Needed for Wheezing. 75 mL 0    amiodarone (PACERONE) 200 MG tablet Take 1 tablet by mouth 2 (Two) Times a Day.      apixaban (ELIQUIS) 5 MG tablet tablet Take 1 tablet by mouth 2 (Two) Times a Day.      bumetanide (BUMEX) 2 MG tablet Take 1 tablet by mouth 2 (Two) Times a Day. 180 tablet 0    busPIRone (BUSPAR) 30 MG tablet Take 1 tablet by mouth 2 (Two) Times a Day 180 tablet 0    cetirizine (zyrTEC) 10 MG tablet Take 1 tablet by mouth Daily. 90 tablet 1    clonazePAM (KlonoPIN) 0.5 MG tablet Take 1 tablet by mouth 3 (Three) Times a Day As Needed for Anxiety. 60 tablet 2    desvenlafaxine (Pristiq) 100 MG 24 hr tablet Take 1 tablet by mouth Daily 90 tablet 0    dilTIAZem (TIAZAC) 180 MG 24 hr capsule Take 1 capsule by mouth Daily. 90 capsule 0    Fluticasone Furoate-Vilanterol (Breo Ellipta) 100-25 MCG/ACT aerosol powder  Inhale 1 puff Daily. 60 each 0    furosemide (LASIX) 20 MG tablet TAKE ONE TABLET BY MOUTH EVERY DAY 90 tablet 0    hydroCHLOROthiazide 25 MG tablet Take 1 tablet by mouth Daily. 90 tablet 0     HYDROcodone-acetaminophen (NORCO) 5-325 MG per tablet Take 1 tablet by mouth Every 6 (Six) Hours As Needed.      HYDROcodone-acetaminophen (NORCO) 5-325 MG per tablet Take 1 tablet by mouth Every 8 to 12 Hours As Needed for pain for up to 30 days. 75 tablet 0    hydrOXYzine (ATARAX) 10 MG tablet Take 1 tablet by mouth 3 (Three) Times a Day As Needed for Anxiety. 90 tablet 0    levothyroxine (Synthroid) 200 MCG tablet Take 1 tablet by mouth Daily. 90 tablet 0    MELATONIN PO Take 1 tablet by mouth At Night As Needed.      Menthol (Biofreeze) 5 % patch Apply 1 patch topically Every Night. 90 patch 1    montelukast (SINGULAIR) 10 MG tablet Take 1 tablet by mouth Every Night. 90 tablet 0    Naloxone HCl (Kloxxado) 8 MG/0.1ML liquid Instill 1 spray in 1 nostril as needed for over sedation or respiratory depression from opioid use.  Repeat 1 spray in alternate nostril every 2-3 minutes until responsive or EMS arrives 2 each 0    nortriptyline (PAMELOR) 10 MG capsule Take 1-2 capsules by mouth every night at bedtime. 60 capsule 0    nystatin (MYCOSTATIN) 519645 UNIT/GM powder Apply  topically to the appropriate area as directed 4 (Four) Times a Day. 60 g 5    ondansetron (Zofran) 4 MG tablet Take 1 tablet by mouth Every 8 (Eight) Hours As Needed for Nausea or Vomiting. 20 tablet 0    pantoprazole (PROTONIX) 20 MG EC tablet TAKE 1 TABLET BY MOUTH DAILY 30 tablet 1    potassium chloride (KLOR-CON M10) 10 MEQ CR tablet Take 1 tablet by mouth.      pramipexole (MIRAPEX) 0.25 MG tablet TAKE ONE TABLET BY MOUTH EVERY DAY AT NIGHT 90 tablet 0    promethazine-dextromethorphan (PROMETHAZINE-DM) 6.25-15 MG/5ML syrup Take 5 mL by mouth 4 (Four) Times a Day As Needed for Cough. 180 mL 0    spironolactone (ALDACTONE) 25 MG tablet Take 1 tablet by mouth 2 (Two) Times a Day. 60 tablet 2    vitamin D3 (vitamin d) 125 MCG (5000 UT) capsule capsule Take 1 capsule by mouth Daily. 90 capsule 1     No current facility-administered medications  "on file prior to visit.       ALLERGIES: Atorvastatin, Methylmethacrylate, and Adhesive tape       Objective   Vitals:    24 1041   Weight: (!) 144 kg (318 lb)   Height: 177.8 cm (70\")     Body mass index is 45.63 kg/m².  Class 3 Severe Obesity (BMI >=40). Obesity-related health conditions include the following: lower extremity venous stasis disease. Obesity is unchanged. BMI is is above average; BMI management plan is completed. We discussed portion control, increasing exercise, and Information on healthy weight added to patient's after visit summary.      PHYSICAL EXAM:   Physical Exam  Constitutional:       Appearance: Normal appearance.   HENT:      Head: Normocephalic and atraumatic.      Nose: Nose normal.   Eyes:      Extraocular Movements: Extraocular movements intact.      Pupils: Pupils are equal, round, and reactive to light.   Cardiovascular:      Rate and Rhythm: Normal rate.      Pulses: Normal pulses.      Heart sounds: Normal heart sounds.   Pulmonary:      Effort: Pulmonary effort is normal.      Breath sounds: Normal breath sounds.   Abdominal:      General: Abdomen is flat. Bowel sounds are normal.      Palpations: Abdomen is soft.   Musculoskeletal:         General: Normal range of motion.      Cervical back: Normal range of motion and neck supple.      Right lower le+ Edema present.      Left lower le+ Edema present.   Skin:     General: Skin is warm and dry.   Neurological:      General: No focal deficit present.      Mental Status: She is alert and oriented to person, place, and time. Mental status is at baseline.   Psychiatric:         Mood and Affect: Mood normal.         Thought Content: Thought content normal.          Result Review   LABS:    CBC          2023    11:30   CBC   WBC 8.65    RBC 4.36    Hemoglobin 13.4    Hematocrit 40.9    MCV 93.8    MCH 30.7    MCHC 32.8    RDW 12.7    Platelets 311      BMP          2023    11:30 2/15/2024    16:49   BMP   BUN " 12  19    Creatinine 0.80  0.78    Sodium 139  140    Potassium 3.9  4.1    Chloride 102  103    CO2 27.0  26.4    Calcium 9.3  8.9      Lipid Panel          12/11/2023    11:30   Lipid Panel   Total Cholesterol 196    Triglycerides 88    HDL Cholesterol 64    VLDL Cholesterol 16    LDL Cholesterol  116    LDL/HDL Ratio 1.79       INR          2/3/2024    23:02 7/3/2024    16:41   Common Labs   INR 1.23     1.3          Details          This result is from an external source.              A1C Last 3 Results          12/11/2023    11:30   HGBA1C Last 3 Results   Hemoglobin A1C 5.40         Results Review:       I reviewed the patient's new clinical results.    The following radiologic or non-invasive studies have been reviewed by me: NEGATIVE for CHF with ejection fraction 68% on nuclear scan  No results found for this or any previous visit from the past 365 days.     XR Hip 2-3 Vws LT    Result Date: 8/5/2024  Complaint: Pain Date of Exam: 8/5/24 Procedure Performed: XR HIP 2-3 VWS LT   Performing MD: Marco Antonio Biswas MD History: Pain Impression: X-rays left hip show mild to moderate arthritic change.  No high-grade arthritis, collapse or remodeling of the femoral head are present.  No fractures Pression: Left hip arthritis Interpreted By: Marco Antonio Biswas MD    XR Shoulder Comp Min 2 Vw LT    Result Date: 8/5/2024  Complaint: Pain Date of Exam: 8/5/24 Procedure Performed: XR SHOULDER COMP MIN 2 VW LT Performing MD: Marco Antonio Biswas MD History: Pain Impression: X-rays show advanced/end-stage arthritic change of the left shoulder.  Flattening and remodeling of the humeral head in the glenohumeral joint.  AC joint arthropathy.  No fractures Impression: End-stage arthritis left shoulder Interpreted By: Marco Antonio Biswas MD    XR Knee 4 or More Vws LT    Result Date: 8/5/2024  Complaint: Pain Date of Exam: 8/5/24 Procedure Performed: XR KNEE 4 OR MORE VWS LT Performing MD: Marco Antonio Biswas MD History: Pain  Impression: X-rays show well-placed, fixed, aligned femoral and tibial components of a press-fit revision hinged knee arthroplasty.  There is some erosive change and radiolucency around the press-fit patellar component consistent with loosening. Impression: Suspected loose/failed press-fit patellar component Interpreted By: Marco Antonio Biswas MD                 ASSESSMENT/PLAN:   Diagnoses and all orders for this visit:    1. Venous stasis (Primary)  -     Venous w Reflux Lower Extremity - Bilateral CAR; Future    2. Venous (peripheral) insufficiency  -     Venous w Reflux Lower Extremity - Bilateral CAR; Future    3. Chronic venous hypertension with ulcer and inflammation involving both sides  -     Venous w Reflux Lower Extremity - Bilateral CAR; Future    4. Lymph edema  -     Venous w Reflux Lower Extremity - Bilateral CAR; Future       68 y.o. female with bilateral lower extremity swelling and early stasis injury that definitely needs to be controlled.  Working to try to get her into some medical grade compression socks and hopefully she will be able to tolerate them and wear them.  In the interim I have recommended bilateral lower extremity venous scan to evaluate for deep and superficial reflux.  Will wait on doing full mapping's until we know exactly what we have.  Her pattern of swelling is combination of venous and lymphatic type of swelling.  Luckily her heart workup has been negative for CHF.  She also probably has some dependent edema from her stationary lifestyle with her legs being dependent almost all the time.  I think support is our first option but if she cannot tolerate it then nonelastic compression is another option.  I believe that edema therapist would be a worthwhile thing to pursue and they are going to look around [place] to see if they can find one there.    I discussed the plan with the patient and family who are agreeable to the plan of care at this point. Thank you for this consult.    Follow Up  Return in about 3 months (around 11/21/2024).    Bello Pedroza MD   08/21/24

## 2024-10-23 ENCOUNTER — OFFICE VISIT (OUTPATIENT)
Dept: GASTROENTEROLOGY | Facility: CLINIC | Age: 69
End: 2024-10-23
Payer: MEDICARE

## 2024-10-23 VITALS
HEIGHT: 70 IN | BODY MASS INDEX: 41.95 KG/M2 | HEART RATE: 64 BPM | DIASTOLIC BLOOD PRESSURE: 69 MMHG | WEIGHT: 293 LBS | SYSTOLIC BLOOD PRESSURE: 133 MMHG

## 2024-10-23 DIAGNOSIS — Z87.19 HISTORY OF CIRRHOSIS: ICD-10-CM

## 2024-10-23 DIAGNOSIS — K44.9 HIATAL HERNIA: ICD-10-CM

## 2024-10-23 DIAGNOSIS — K21.00 GASTROESOPHAGEAL REFLUX DISEASE WITH ESOPHAGITIS WITHOUT HEMORRHAGE: Primary | ICD-10-CM

## 2024-10-23 DIAGNOSIS — R68.81 EARLY SATIETY: ICD-10-CM

## 2024-10-23 DIAGNOSIS — K59.03 DRUG-INDUCED CONSTIPATION: ICD-10-CM

## 2024-10-23 PROCEDURE — 99214 OFFICE O/P EST MOD 30 MIN: CPT | Performed by: NURSE PRACTITIONER

## 2024-10-23 PROCEDURE — 1159F MED LIST DOCD IN RCRD: CPT | Performed by: NURSE PRACTITIONER

## 2024-10-23 PROCEDURE — 3075F SYST BP GE 130 - 139MM HG: CPT | Performed by: NURSE PRACTITIONER

## 2024-10-23 PROCEDURE — 3078F DIAST BP <80 MM HG: CPT | Performed by: NURSE PRACTITIONER

## 2024-10-23 PROCEDURE — 1160F RVW MEDS BY RX/DR IN RCRD: CPT | Performed by: NURSE PRACTITIONER

## 2024-10-23 RX ORDER — ESOMEPRAZOLE MAGNESIUM 40 MG/1
40 CAPSULE, DELAYED RELEASE ORAL 2 TIMES DAILY
Qty: 60 CAPSULE | Refills: 5 | Status: SHIPPED | OUTPATIENT
Start: 2024-10-23

## 2024-10-23 NOTE — PROGRESS NOTES
Chief Complaint   Heartburn, Abdominal Pain, and Nausea    History of Present Illness       Yulissa Spaulding is a 68 y.o. female who presents today accompanied by her daughter to [facility] for follow-up for GERD.  She was last seen in the office by me on 10/23/2023.    She has history of gastric sleeve and cholecystectomy. She has hx Afib and is on ELIQUIS. She denies any dysphagia or reflux.      She has a history of cirrhosis and underwent FibroScan in February 2019 that showed liver stiffness consistent with F4 cirrhosis.  History of chronic hepatitis C.Most recent blood work does show normal liver enzymes as of this past June. Had initially tested positive for chronic Hep C at PCP office. Saw Lillian in 2019 at [facility] and had labs done that showed HEP C not detected. Patient was lost to follow up after that. Denies hx fatty disease.      GI family history----Maternal aunt with colon cancer. Daughter with colon cancer.      tablets by mouth at 6 pm and 12 tablets 4 hours prior to procedure.  Dispense: 24 tablet; Refill: 0     She underwent EGD and colonoscopy with Dr. Gasca on 12/11/2023.  EGD was for cirrhosis rule out esophageal varices.  EGD showed an irregular Z-line.  Small hiatal hernia.  Gastritis.  A sleeve gastrectomy was found.  Repeat EGD in 2 years.  Colonoscopy was for melena and rectal bleeding.  Colonoscopy showed mild diverticulosis.  Two 3 to 4 mm rectal polyps were removed.  Path positive for gastritis and reflux esophagitis.  Repeat colonoscopy in 5 years.    Most recent blood work done on 7/3/2024 showed an elevated ALT at 46.  Alkaline phosphatase, AST and total bili were normal.    Most recent liver imaging showed a normal liver on CT scan of the abdomen and pelvis on 2/3/2024.  No evidence of cirrhosis noted.    She does report terrible constipation. She has tried stool softeners and miralax without relief. Has not tried LINZESS> Having  "awful reflux. She is tasting nastiness in her mouth and belching and burping. She is on pain meds.   Results       Result Review :       CMP          12/11/2023    11:30 2/15/2024    16:49   CMP   Glucose 99  105    BUN 12  19    Creatinine 0.80  0.78    EGFR 79.4  81.8    Sodium 139  140    Potassium 3.9  4.1    Chloride 102  103    Calcium 9.3  8.9    Total Protein 6.6  6.9    Albumin 4.2  4.1    Globulin 2.4  2.8    Total Bilirubin 0.4  0.3    Alkaline Phosphatase 77  78    AST (SGOT) 28  18    ALT (SGPT) 36  18    Albumin/Globulin Ratio 1.8  1.5    BUN/Creatinine Ratio 15.0  24.4    Anion Gap 10.0  10.6      CBC          12/11/2023    11:30   CBC   WBC 8.65    RBC 4.36    Hemoglobin 13.4    Hematocrit 40.9    MCV 93.8    MCH 30.7    MCHC 32.8    RDW 12.7    Platelets 311      CBC w/diff          12/11/2023    11:30   CBC w/Diff   WBC 8.65    RBC 4.36    Hemoglobin 13.4    Hematocrit 40.9    MCV 93.8    MCH 30.7    MCHC 32.8    RDW 12.7    Platelets 311    Neutrophil Rel % 79.4    Immature Granulocyte Rel % 0.5    Lymphocyte Rel % 11.3    Monocyte Rel % 7.1    Eosinophil Rel % 1.0    Basophil Rel % 0.7      Lipid Panel          12/11/2023    11:30   Lipid Panel   Total Cholesterol 196    Triglycerides 88    HDL Cholesterol 64    VLDL Cholesterol 16    LDL Cholesterol  116    LDL/HDL Ratio 1.79      TSH          12/11/2023    11:30   TSH   TSH 0.834        Lipase No results found for: \"LIPASE\"  Amylase No results found for: \"AMYLASE\"  Iron Profile No results found for: \"IRON\", \"TIBC\", \"LABIRON\", \"TRANSFERRIN\"  Ferritin No results found for: \"FERRITIN\"  ESR (Sed Rate)   Sed Rate   Date Value Ref Range Status   07/17/2019 1 0 - 30 mm/h Final     CRP (C-Reactive)   C-Reactive Protein   Date Value Ref Range Status   07/17/2019 6.00 (H) 0.00 - 5.00 mg/L Final     Comment:     In very rare cases, gammopathy, in particular type IgM  (Waldenstrom's macroglobulinemia), may cause unreliable results.       Liver Workup " "  Protime   Date Value Ref Range Status   07/03/2024 14.3 (H) 9.4 - 12.5 Second Final   08/27/2019 16.1 (H) 10.3 - 13.3 s Final     Comment:     (note)  Anticoagulants may alter the results of Laboratory   Coagulation assays. New-generation anticoagulants such as   direct Thrombin inhibitors (Dabigatran/Pradaxa, Argatroban,   Bivalrudin) and direct/indirect factor Xa inhibitors   (Rivaroxaban/Xarelto,Apixaban/Eliquis, Danaparoid/Orgaran,   Fondaparinux/Arixtra) have been shown to affect the results   of Laboratory Coagulation assays, creating falsely elevated   or decreased values. Correlation with medication history is   advised.     INR   Date Value Ref Range Status   07/03/2024 1.3  Final     AFP No results found for: \"AFP\"   PT/INR   Protime   Date Value Ref Range Status   07/03/2024 14.3 (H) 9.4 - 12.5 Second Final   08/27/2019 16.1 (H) 10.3 - 13.3 s Final     Comment:     (note)  Anticoagulants may alter the results of Laboratory   Coagulation assays. New-generation anticoagulants such as   direct Thrombin inhibitors (Dabigatran/Pradaxa, Argatroban,   Bivalrudin) and direct/indirect factor Xa inhibitors   (Rivaroxaban/Xarelto,Apixaban/Eliquis, Danaparoid/Orgaran,   Fondaparinux/Arixtra) have been shown to affect the results   of Laboratory Coagulation assays, creating falsely elevated   or decreased values. Correlation with medication history is   advised.     INR   Date Value Ref Range Status   07/03/2024 1.3  Final     Ammonia No results found for: \"AMMONIA\"            Past Medical History       Past Medical History:   Diagnosis Date    Anxiety     Atrial fibrillation     Chronic pain disorder     Depression     Disease of thyroid gland     Mood disorder 02/01/2022    Obsessive-compulsive disorder     Panic disorder     PONV (postoperative nausea and vomiting)     Psychiatric illness     Self-injurious behavior     patient says she picks at her skin when nervous       Past Surgical History:   Procedure " Laterality Date    BREAST SURGERY Left     tumor removed    CARDIAC SURGERY      installed LOOP recorder    CHOLECYSTECTOMY      COLONOSCOPY N/A 12/11/2023    Procedure: COLONOSCOPY WITH POLYPECTOMIES;  Surgeon: Eun Gasca MD;  Location: [facility] ENDOSCOPY;  Service: Gastroenterology;  Laterality: N/A;  COLON POLYPS    ENDOSCOPY N/A 12/11/2023    Procedure: ESOPHAGOGASTRODUODENOSCOPY WITH BIOPSIES;  Surgeon: Eun Gasca MD;  Location: [facility] ENDOSCOPY;  Service: Gastroenterology;  Laterality: N/A;  GASTRITIS, HIATAL HERNIA    GASTRIC SLEEVE LAPAROSCOPIC      HERNIA REPAIR      HYSTERECTOMY      REPLACEMENT TOTAL KNEE Left          Current Outpatient Medications:     Acetaminophen (Tylenol) 325 MG capsule, Take 1,000 mg by mouth., Disp: , Rfl:     albuterol (PROVENTIL) (2.5 MG/3ML) 0.083% nebulizer solution, Inhale 1 vial by nebulization Every 6 (Six) Hours As Needed for Wheezing., Disp: 75 mL, Rfl: 0    amiodarone (PACERONE) 200 MG tablet, Take 1 tablet by mouth 2 (Two) Times a Day., Disp: , Rfl:     bumetanide (BUMEX) 2 MG tablet, Take 1 tablet by mouth 2 (Two) Times a Day., Disp: 180 tablet, Rfl: 0    bumetanide (BUMEX) 2 MG tablet, Take 1 tablet by mouth 2 (Two) Times a Day., Disp: 180 tablet, Rfl: 0    cetirizine (zyrTEC) 10 MG tablet, Take 1 tablet by mouth Daily., Disp: 90 tablet, Rfl: 1    clonazePAM (KlonoPIN) 0.5 MG tablet, Take 1 tablet by mouth 3 (Three) Times a Day As Needed for Anxiety., Disp: 60 tablet, Rfl: 2    desvenlafaxine (Pristiq) 100 MG 24 hr tablet, Take 1 tablet by mouth Daily, Disp: 90 tablet, Rfl: 0    dilTIAZem (TIAZAC) 180 MG 24 hr capsule, Take 1 capsule by mouth Daily., Disp: 90 capsule, Rfl: 0    Fluticasone Furoate-Vilanterol (Breo Ellipta) 100-25 MCG/ACT aerosol powder , Inhale 1 puff Daily., Disp: 60 each, Rfl: 0    furosemide (LASIX) 20 MG tablet, TAKE ONE TABLET BY MOUTH EVERY DAY, Disp: 90 tablet, Rfl: 0    hydroCHLOROthiazide 25 MG tablet, Take 1 tablet by  mouth Daily., Disp: 90 tablet, Rfl: 0    HYDROcodone-acetaminophen (NORCO) 5-325 MG per tablet, Take 1 tablet by mouth Every 6 (Six) Hours As Needed., Disp: , Rfl:     HYDROcodone-acetaminophen (NORCO) 5-325 MG per tablet, Take 1 tablet by mouth every 8-12 hours as needed for pain, Disp: 15 tablet, Rfl: 0    HYDROcodone-acetaminophen (NORCO) 5-325 MG per tablet, Take 1 tablet by mouth every 8-12 hours as needed, Disp: 75 tablet, Rfl: 0    hydrOXYzine (ATARAX) 10 MG tablet, Take 1 tablet by mouth 3 (Three) Times a Day As Needed for Anxiety., Disp: 90 tablet, Rfl: 0    levothyroxine (Synthroid) 200 MCG tablet, Take 1 tablet by mouth Daily., Disp: 90 tablet, Rfl: 0    MELATONIN PO, Take 1 tablet by mouth At Night As Needed., Disp: , Rfl:     Menthol (Biofreeze) 5 % patch, Apply 1 patch topically Every Night., Disp: 90 patch, Rfl: 1    montelukast (SINGULAIR) 10 MG tablet, Take 1 tablet by mouth Every Night., Disp: 90 tablet, Rfl: 0    Naloxone HCl (Kloxxado) 8 MG/0.1ML liquid, Instill 1 spray in 1 nostril as needed for over sedation or respiratory depression from opioid use.  Repeat 1 spray in alternate nostril every 2-3 minutes until responsive or EMS arrives, Disp: 2 each, Rfl: 0    nortriptyline (PAMELOR) 10 MG capsule, Take 1-2 capsules by mouth every night at bedtime., Disp: 180 capsule, Rfl: 0    nystatin (MYCOSTATIN) 778937 UNIT/GM powder, Apply  topically to the appropriate area as directed 4 (Four) Times a Day., Disp: 60 g, Rfl: 5    ondansetron (Zofran) 4 MG tablet, Take 1 tablet by mouth Every 8 (Eight) Hours As Needed for Nausea or Vomiting., Disp: 20 tablet, Rfl: 0    potassium chloride (KLOR-CON M10) 10 MEQ CR tablet, Take 1 tablet by mouth., Disp: , Rfl:     pramipexole (MIRAPEX) 0.25 MG tablet, TAKE ONE TABLET BY MOUTH EVERY DAY AT NIGHT, Disp: 90 tablet, Rfl: 0    promethazine-dextromethorphan (PROMETHAZINE-DM) 6.25-15 MG/5ML syrup, Take 5 mL by mouth 4 (Four) Times a Day As Needed for Cough., Disp:  "180 mL, Rfl: 0    spironolactone (ALDACTONE) 25 MG tablet, Take 1 tablet by mouth 2 (Two) Times a Day., Disp: 60 tablet, Rfl: 2    vitamin D3 (vitamin d) 125 MCG (5000 UT) capsule capsule, Take 1 capsule by mouth Daily., Disp: 90 capsule, Rfl: 1    busPIRone (BUSPAR) 30 MG tablet, Take 1 tablet by mouth 2 (Two) Times a Day, Disp: 180 tablet, Rfl: 0    esomeprazole (nexIUM) 40 MG capsule, Take 1 capsule by mouth 2 (Two) Times a Day., Disp: 60 capsule, Rfl: 5    linaclotide (Linzess) 72 MCG capsule capsule, Take 1 capsule by mouth Every Morning Before Breakfast., Disp: 12 capsule, Rfl: 0     Allergies   Allergen Reactions    Atorvastatin Myalgia     Joint pain    Methylmethacrylate Swelling and Other (See Comments)     (bone cement)      Adhesive Tape Rash     Off-brand bandaids         Family History   Problem Relation Age of Onset    Anxiety disorder Mother     Dementia Mother     Depression Mother     Anxiety disorder Sister     Depression Sister     Colon cancer Maternal Aunt     Colon cancer Daughter         Social History     Social History Narrative    Not on file       Objective       Review of Systems   Constitutional:  Negative for appetite change, fatigue, fever, unexpected weight gain and unexpected weight loss.   HENT:  Negative for trouble swallowing.    Respiratory:  Negative for cough, choking, chest tightness, shortness of breath, wheezing and stridor.    Cardiovascular:  Negative for chest pain, palpitations and leg swelling.   Gastrointestinal:  Positive for abdominal distention, abdominal pain, constipation, nausea, vomiting, GERD and indigestion. Negative for anal bleeding, blood in stool, diarrhea and rectal pain.        Vital Signs:   /69 (BP Location: Left arm, Patient Position: Sitting, Cuff Size: Adult)   Pulse 64   Ht 177.8 cm (70\")   Wt (!) 145 kg (318 lb 12.8 oz)   BMI 45.74 kg/m²       Physical Exam  Constitutional:       General: She is not in acute distress.     Appearance: " She is well-developed. She is not ill-appearing.   HENT:      Head: Normocephalic.   Eyes:      Pupils: Pupils are equal, round, and reactive to light.   Cardiovascular:      Rate and Rhythm: Normal rate and regular rhythm.      Heart sounds: Normal heart sounds.   Pulmonary:      Effort: Pulmonary effort is normal.      Breath sounds: Normal breath sounds.   Abdominal:      General: Bowel sounds are normal. There is no distension.      Palpations: Abdomen is soft. There is no mass.      Tenderness: There is no abdominal tenderness. There is no guarding or rebound.      Hernia: No hernia is present.   Musculoskeletal:         General: Normal range of motion.   Skin:     General: Skin is warm and dry.   Neurological:      Mental Status: She is alert and oriented to person, place, and time.   Psychiatric:         Speech: Speech normal.         Behavior: Behavior normal.         Judgment: Judgment normal.           Assessment & Plan          Assessment and Plan    Diagnoses and all orders for this visit:    1. Gastroesophageal reflux disease with esophagitis without hemorrhage (Primary)  -     esomeprazole (nexIUM) 40 MG capsule; Take 1 capsule by mouth 2 (Two) Times a Day.  Dispense: 60 capsule; Refill: 5  -     NM Gastric Emptying; Future    2. Early satiety  -     NM Gastric Emptying; Future    3. Hiatal hernia  -     NM Gastric Emptying; Future    4. Drug-induced constipation  -     NM Gastric Emptying; Future  -     linaclotide (Linzess) 72 MCG capsule capsule; Take 1 capsule by mouth Every Morning Before Breakfast.  Dispense: 12 capsule; Refill: 0    5. History of cirrhosis    Reviewed EGD and colonoscopy results with her and her daughter today.  GERD is clearly not well-controlled at this time.  We are going to stop the Protonix.  Start Nexium 40 mg twice daily.  Constipation is not well-controlled either.  Stop MiraLAX and stool softeners.  Start Linzess 72 daily.  Samples given today.  Continue GERD  precautions.  I do worry about gastroparesis given her symptoms and history of gastrectomy.  Will check a gastric emptying study for further evaluation.  Most recent imaging of the liver showed a completely normal liver.  LFTs normal.  Patient to call the office in 2 weeks with an update.  Patient to follow-up with me in 3 months.  Patient is agreeable to the plan.            Follow Up       Follow Up   Return in about 3 months (around 1/23/2025) for CONSTIPATION, GERD, CIRRHOSIS.  Patient was given instructions and counseling regarding her condition or for health maintenance advice. Please see specific information pulled into the AVS if appropriate.

## 2024-10-23 NOTE — PATIENT INSTRUCTIONS
Stop protonix (pantoprazole)     Start nexium 40 mg twice a day (before food)  Start LINZESS 72 daily before food for constipation    Give me update in 1-2 weeks (MYCHART me or call office )

## 2024-10-29 ENCOUNTER — TELEPHONE (OUTPATIENT)
Dept: GASTROENTEROLOGY | Facility: CLINIC | Age: 69
End: 2024-10-29
Payer: OTHER GOVERNMENT

## 2024-10-29 NOTE — TELEPHONE ENCOUNTER
Patient called about testing that Darlene Garcia ordered, advised patient that this was something that our [facility] department would have to schedule. Due to myself not having access to their scheduled. Transferred patient to the [facility] for scheduling of this testing.

## 2024-10-30 ENCOUNTER — TELEPHONE (OUTPATIENT)
Dept: GASTROENTEROLOGY | Facility: CLINIC | Age: 69
End: 2024-10-30
Payer: OTHER GOVERNMENT

## 2024-10-30 NOTE — TELEPHONE ENCOUNTER
Caller: Yulissa Spaulding    Relationship to patient: Self    Best call back number: [phone]    Patient is needing: PT HAS GASTRIC EMPTYING TEST ON 11.25 AND WAS WONDERING IF SHE NEEDS TO STOP HER ELOQUIS PRIOR TO.

## 2024-10-31 NOTE — TELEPHONE ENCOUNTER
Spoke with Glendy in nuclear medicine and she reported that the patient will be fine to continue their Eliquis.     Patient was notified and verbalized understanding.

## 2024-11-01 ENCOUNTER — TELEPHONE (OUTPATIENT)
Dept: GASTROENTEROLOGY | Facility: CLINIC | Age: 69
End: 2024-11-01
Payer: OTHER GOVERNMENT

## 2024-11-01 NOTE — TELEPHONE ENCOUNTER
Patient called and left a voicemail with an update. Patient stated that her Nexium is really helping her but that her Linzess is not helping at all. Patient stated that she may need an increased dosage of Linzess.

## 2024-11-04 DIAGNOSIS — K59.03 DRUG-INDUCED CONSTIPATION: ICD-10-CM

## 2024-11-04 NOTE — TELEPHONE ENCOUNTER
Patient was notified and verbalized understanding. Patient will try them and let us know how they work.

## 2024-11-04 NOTE — TELEPHONE ENCOUNTER
Patient called and requested that we start sending further refills of her Linzess 145 to [facility].

## 2024-11-25 ENCOUNTER — TELEPHONE (OUTPATIENT)
Dept: GASTROENTEROLOGY | Facility: CLINIC | Age: 69
End: 2024-11-25
Payer: OTHER GOVERNMENT

## 2024-11-25 ENCOUNTER — HOSPITAL ENCOUNTER (OUTPATIENT)
Dept: NUCLEAR MEDICINE | Facility: HOSPITAL | Age: 69
Discharge: HOME | End: 2024-11-25
Payer: MEDICARE

## 2024-11-25 DIAGNOSIS — K44.9 HIATAL HERNIA: ICD-10-CM

## 2024-11-25 DIAGNOSIS — K59.03 DRUG-INDUCED CONSTIPATION: ICD-10-CM

## 2024-11-25 DIAGNOSIS — K21.00 GASTROESOPHAGEAL REFLUX DISEASE WITH ESOPHAGITIS WITHOUT HEMORRHAGE: ICD-10-CM

## 2024-11-25 DIAGNOSIS — R68.81 EARLY SATIETY: ICD-10-CM

## 2024-11-25 PROCEDURE — 78264 GASTRIC EMPTYING IMG STUDY: CPT

## 2024-11-25 PROCEDURE — A9541 TC99M SULFUR COLLOID: HCPCS | Performed by: NURSE PRACTITIONER

## 2024-11-25 PROCEDURE — 34310000005 TECHNETIUM SULFUR COLLOID: Performed by: NURSE PRACTITIONER

## 2024-11-25 RX ADMIN — TECHNETIUM TC 99M SULFUR COLLOID 1 DOSE: KIT at 08:00

## 2024-11-25 NOTE — TELEPHONE ENCOUNTER
LVM: for patient to call back about some results.   


Edge 

## 2024-11-25 NOTE — TELEPHONE ENCOUNTER
----- Message from Darlene Garcia sent at 11/25/2024  3:46 PM EST -----  Gastric emptying study did show some delay.  Would recommend she start following a gastroparesis diet.  We can mail her out some handouts.  She can also look online about it.  This involves eating smaller more frequent meals.  Staying away from high fibrous foods.  Avoiding heavy red meats.  Patient to follow-up with me as planned.

## 2024-11-26 ENCOUNTER — TELEPHONE (OUTPATIENT)
Dept: GASTROENTEROLOGY | Facility: CLINIC | Age: 69
End: 2024-11-26
Payer: OTHER GOVERNMENT

## 2024-11-26 NOTE — TELEPHONE ENCOUNTER
Patient was notified and reported that she has seen and ENT in [place] and got a tube down her nose and was told that nothing was wrong from an ENT stand point. Patient stated that she has a gurgling feeling when she drinks liquid. Patient asked if Darlene thought anything else could be wrong with her.

## 2025-01-17 ENCOUNTER — HOSPITAL ENCOUNTER (OUTPATIENT)
Facility: HOSPITAL | Age: 70
Discharge: HOME | End: 2025-01-17
Payer: MEDICARE

## 2025-01-17 DIAGNOSIS — I89.0 LYMPH EDEMA: ICD-10-CM

## 2025-01-17 DIAGNOSIS — I87.2 VENOUS (PERIPHERAL) INSUFFICIENCY: ICD-10-CM

## 2025-01-17 DIAGNOSIS — I87.333 CHRONIC VENOUS HYPERTENSION WITH ULCER AND INFLAMMATION INVOLVING BOTH SIDES: ICD-10-CM

## 2025-01-17 DIAGNOSIS — I87.8 VENOUS STASIS: ICD-10-CM

## 2025-01-17 PROCEDURE — 93970 EXTREMITY STUDY: CPT

## 2025-01-20 ENCOUNTER — OFFICE VISIT (OUTPATIENT)
Dept: GASTROENTEROLOGY | Facility: CLINIC | Age: 70
End: 2025-01-20
Payer: MEDICARE

## 2025-01-20 VITALS
BODY MASS INDEX: 41.95 KG/M2 | SYSTOLIC BLOOD PRESSURE: 113 MMHG | WEIGHT: 293 LBS | HEIGHT: 70 IN | OXYGEN SATURATION: 98 % | HEART RATE: 60 BPM | DIASTOLIC BLOOD PRESSURE: 41 MMHG | TEMPERATURE: 96.4 F

## 2025-01-20 DIAGNOSIS — K31.84 GASTROPARESIS: ICD-10-CM

## 2025-01-20 DIAGNOSIS — K21.00 GASTROESOPHAGEAL REFLUX DISEASE WITH ESOPHAGITIS WITHOUT HEMORRHAGE: ICD-10-CM

## 2025-01-20 DIAGNOSIS — Z87.19 HISTORY OF CIRRHOSIS: ICD-10-CM

## 2025-01-20 DIAGNOSIS — K44.9 HIATAL HERNIA: ICD-10-CM

## 2025-01-20 DIAGNOSIS — K59.03 DRUG-INDUCED CONSTIPATION: Primary | ICD-10-CM

## 2025-01-20 LAB
BH CV LEFT LOWER VAS GSV KNEE TRANSVERSE DIAMETER: 0.6 CM
BH CV LEFT LOWER VAS GSV MID CALF TRANS DIAMETER: 0.4 CM
BH CV LEFT LOWER VAS GSV PROX TRANSVERSE DIAMETER: 0.5 CM
BH CV LEFT LOWER VAS PERFORATOR 1 TRANS DIAMETER: 0.3 CM
BH CV LEFT LOWER VAS SAPHENOFEMORAL JUNCTION TRANSVERSE DIAMETER: 0.7 CM
BH CV LEFT LOWER VAS SSV MID CALF TRANS DIAMETER: 0.5 CM
BH CV LEFT LOWER VAS SSV PROX CALF TRANS DIAMETER: 0.5 CM
BH CV LOW VAS LEFT EXTERNAL ILIAC AUGMENT: NORMAL
BH CV LOW VAS LEFT EXTERNAL ILIAC COMPRESS: NORMAL
BH CV LOW VAS RIGHT LESSER SAPH VESSEL: 1
BH CV LOWER VAS LEFT GSV DIST THIGH COMPRESSIBILTY: NORMAL
BH CV LOWER VAS RIGHT GSV DIST THIGH COMPRESSIBILTY: NORMAL
BH CV LOWER VASCULAR LEFT COMMON FEMORAL AUGMENT: NORMAL
BH CV LOWER VASCULAR LEFT COMMON FEMORAL COMPETENT: NORMAL
BH CV LOWER VASCULAR LEFT COMMON FEMORAL COMPRESS: NORMAL
BH CV LOWER VASCULAR LEFT COMMON FEMORAL PHASIC: NORMAL
BH CV LOWER VASCULAR LEFT COMMON FEMORAL SPONT: NORMAL
BH CV LOWER VASCULAR LEFT DISTAL FEMORAL COMPRESS: NORMAL
BH CV LOWER VASCULAR LEFT EXTERNAL ILIAC COMPETENT: NORMAL
BH CV LOWER VASCULAR LEFT EXTERNAL ILIAC PHASIC: NORMAL
BH CV LOWER VASCULAR LEFT EXTERNAL ILIAC SPONT: NORMAL
BH CV LOWER VASCULAR LEFT GASTRONEMIUS COMPRESS: NORMAL
BH CV LOWER VASCULAR LEFT GREATER SAPH AK COMPETENT: NORMAL
BH CV LOWER VASCULAR LEFT GREATER SAPH AK COMPRESS: NORMAL
BH CV LOWER VASCULAR LEFT GSV DIST THIGH COMPETENT: NORMAL
BH CV LOWER VASCULAR LEFT MID FEMORAL AUGMENT: NORMAL
BH CV LOWER VASCULAR LEFT MID FEMORAL COMPETENT: NORMAL
BH CV LOWER VASCULAR LEFT MID FEMORAL COMPRESS: NORMAL
BH CV LOWER VASCULAR LEFT MID FEMORAL PHASIC: NORMAL
BH CV LOWER VASCULAR LEFT MID FEMORAL SPONT: NORMAL
BH CV LOWER VASCULAR LEFT PERFORATOR 1 COMPETENT: NORMAL
BH CV LOWER VASCULAR LEFT PERFORATOR 1 COMPRESS: NORMAL
BH CV LOWER VASCULAR LEFT PERONEAL COMPRESS: NORMAL
BH CV LOWER VASCULAR LEFT POPLITEAL AUGMENT: NORMAL
BH CV LOWER VASCULAR LEFT POPLITEAL COMPETENT: NORMAL
BH CV LOWER VASCULAR LEFT POPLITEAL COMPRESS: NORMAL
BH CV LOWER VASCULAR LEFT POPLITEAL PHASIC: NORMAL
BH CV LOWER VASCULAR LEFT POPLITEAL SPONT: NORMAL
BH CV LOWER VASCULAR LEFT POSTERIOR TIBIAL COMPRESS: NORMAL
BH CV LOWER VASCULAR LEFT PROFUNDA FEMORAL COMPRESS: NORMAL
BH CV LOWER VASCULAR LEFT PROXIMAL FEMORAL COMPRESS: NORMAL
BH CV LOWER VASCULAR LEFT SAPHENOFEMORAL JUNCTION AUGMENT: NORMAL
BH CV LOWER VASCULAR LEFT SAPHENOFEMORAL JUNCTION COMPETENT: NORMAL
BH CV LOWER VASCULAR LEFT SAPHENOFEMORAL JUNCTION COMPRESS: NORMAL
BH CV LOWER VASCULAR LEFT SAPHENOFEMORAL JUNCTION PHASIC: NORMAL
BH CV LOWER VASCULAR LEFT SAPHENOFEMORAL JUNCTION SPONT: NORMAL
BH CV LOWER VASCULAR RIGHT COMMON FEMORAL AUGMENT: NORMAL
BH CV LOWER VASCULAR RIGHT COMMON FEMORAL COMPETENT: NORMAL
BH CV LOWER VASCULAR RIGHT COMMON FEMORAL COMPRESS: NORMAL
BH CV LOWER VASCULAR RIGHT COMMON FEMORAL PHASIC: NORMAL
BH CV LOWER VASCULAR RIGHT COMMON FEMORAL SPONT: NORMAL
BH CV LOWER VASCULAR RIGHT DISTAL FEMORAL COMPRESS: NORMAL
BH CV LOWER VASCULAR RIGHT EXTERNAL ILIAC AUGMENT: NORMAL
BH CV LOWER VASCULAR RIGHT EXTERNAL ILIAC COMPETENT: NORMAL
BH CV LOWER VASCULAR RIGHT EXTERNAL ILIAC COMPRESS: NORMAL
BH CV LOWER VASCULAR RIGHT EXTERNAL ILIAC PHASIC: NORMAL
BH CV LOWER VASCULAR RIGHT EXTERNAL ILIAC SPONT: NORMAL
BH CV LOWER VASCULAR RIGHT GASTRONEMIUS COMPRESS: NORMAL
BH CV LOWER VASCULAR RIGHT GREATER SAPH AK COMPETENT: NORMAL
BH CV LOWER VASCULAR RIGHT GSV DIST THIGH COMPETENT: NORMAL
BH CV LOWER VASCULAR RIGHT LESSER SAPH COMPETENT: NORMAL
BH CV LOWER VASCULAR RIGHT LESSER SAPH COMPRESS: NORMAL
BH CV LOWER VASCULAR RIGHT LESSER SAPH THROMBUS: NORMAL
BH CV LOWER VASCULAR RIGHT MID FEMORAL AUGMENT: NORMAL
BH CV LOWER VASCULAR RIGHT MID FEMORAL COMPETENT: NORMAL
BH CV LOWER VASCULAR RIGHT MID FEMORAL COMPRESS: NORMAL
BH CV LOWER VASCULAR RIGHT MID FEMORAL PHASIC: NORMAL
BH CV LOWER VASCULAR RIGHT MID FEMORAL SPONT: NORMAL
BH CV LOWER VASCULAR RIGHT PERFORATOR 1 COMPETENT: NORMAL
BH CV LOWER VASCULAR RIGHT PERFORATOR 1 COMPRESS: NORMAL
BH CV LOWER VASCULAR RIGHT PERONEAL COMPRESS: NORMAL
BH CV LOWER VASCULAR RIGHT POPLITEAL AUGMENT: NORMAL
BH CV LOWER VASCULAR RIGHT POPLITEAL COMPETENT: NORMAL
BH CV LOWER VASCULAR RIGHT POPLITEAL COMPRESS: NORMAL
BH CV LOWER VASCULAR RIGHT POPLITEAL PHASIC: NORMAL
BH CV LOWER VASCULAR RIGHT POPLITEAL SPONT: NORMAL
BH CV LOWER VASCULAR RIGHT POSTERIOR TIBIAL COMPRESS: NORMAL
BH CV LOWER VASCULAR RIGHT PROFUNDA FEMORAL COMPRESS: NORMAL
BH CV LOWER VASCULAR RIGHT PROXIMAL FEMORAL COMPRESS: NORMAL
BH CV LOWER VASCULAR RIGHT SAPHENOFEMORAL JUNCTION AUGMENT: NORMAL
BH CV LOWER VASCULAR RIGHT SAPHENOFEMORAL JUNCTION COMPETENT: NORMAL
BH CV LOWER VASCULAR RIGHT SAPHENOFEMORAL JUNCTION COMPRESS: NORMAL
BH CV LOWER VASCULAR RIGHT SAPHENOFEMORAL JUNCTION PHASIC: NORMAL
BH CV LOWER VASCULAR RIGHT SAPHENOFEMORAL JUNCTION SPONT: NORMAL
BH CV LOWER VASCULAR RIGHT SOLEAL COMPRESS: NORMAL
BH CV LOWER VASCULAR RIGHT SSV MID CALF COMPETENT: NORMAL
BH CV LOWER VASCULAR RIGHT SSV MID CALF COMPRESS: NORMAL
BH CV LOWER VASCULAR RIGHT SSV MID CALF THROMBUS: NORMAL
BH CV LOWER VASCULAR RIGHT SSV MID CALF VESSEL: 1
BH CV RIGHT LOWER VAS AA GSV TRANS DIAMETER: 0.3 CM
BH CV RIGHT LOWER VAS GSV KNEE TRANS DIAMETER: 0.5 CM
BH CV RIGHT LOWER VAS GSV MID CALF REFLUX TIME: 0.62 SEC
BH CV RIGHT LOWER VAS GSV MID CALF TRANS DIAMETER: 0.4 CM
BH CV RIGHT LOWER VAS GSV PROX THIGH TRANS DIAMETER: 0.6 CM
BH CV RIGHT LOWER VAS PERFORATOR 1 TRANS DIAMETER: 0.3 CM
BH CV RIGHT LOWER VAS SAPHENOFEM JUNCTION TRANSVERSE DIAMETER: 0.8 CM
BH CV RIGHT LOWER VAS SSV MID CALF REFLUX TIME: 2.17 SEC
BH CV RIGHT LOWER VAS SSV MID CALF TRANS DIAMETER: 0.4 CM
BH CV RIGHT LOWER VAS SSV PROX CALF REFLUX TIME: 1.31 SEC
BH CV RIGHT LOWER VAS SSV PROX CALF TRANS DIAMETER: 0.6 CM
BH CV VAS RIGHT GSV PROXIMAL HIDDEN LRR COMPRESSIBILTY: NORMAL

## 2025-01-20 PROCEDURE — 1160F RVW MEDS BY RX/DR IN RCRD: CPT | Performed by: NURSE PRACTITIONER

## 2025-01-20 PROCEDURE — 3074F SYST BP LT 130 MM HG: CPT | Performed by: NURSE PRACTITIONER

## 2025-01-20 PROCEDURE — 99214 OFFICE O/P EST MOD 30 MIN: CPT | Performed by: NURSE PRACTITIONER

## 2025-01-20 PROCEDURE — 3078F DIAST BP <80 MM HG: CPT | Performed by: NURSE PRACTITIONER

## 2025-01-20 PROCEDURE — 1159F MED LIST DOCD IN RCRD: CPT | Performed by: NURSE PRACTITIONER

## 2025-01-20 RX ORDER — LETROZOLE 2.5 MG/1
1 TABLET, FILM COATED ORAL DAILY
COMMUNITY

## 2025-01-20 NOTE — PROGRESS NOTES
Chief Complaint   Follow-up and Constipation    History of Present Illness       Yulissa Spaulding is a 69 y.o. female who presents to [facility] for follow-up fr constipation.  She was last seen in the office by me on 10/23/2024.    She has history of gastric sleeve and cholecystectomy. She has hx Afib and is on ELIQUIS. She denies any dysphagia or reflux.      She has a history of cirrhosis and underwent FibroScan in February 2019 that showed liver stiffness consistent with F4 cirrhosis.  History of chronic hepatitis C.Most recent blood work does show normal liver enzymes as of this past June. Had initially tested positive for chronic Hep C at PCP office. Saw Lillian in 2019 at [facility] care clinic and had labs done that showed HEP C not detected. Patient was lost to follow up after that. Denies hx fatty disease.      GI family history----Maternal aunt with colon cancer. Daughter with colon cancer.      She underwent EGD and colonoscopy with Dr. Gasca on 12/11/2023.  EGD was for cirrhosis rule out esophageal varices.  EGD showed an irregular Z-line.  Small hiatal hernia.  Gastritis.  A sleeve gastrectomy was found.  Repeat EGD in 2 years.  Colonoscopy was for melena and rectal bleeding.  Colonoscopy showed mild diverticulosis.  Two 3 to 4 mm rectal polyps were removed.  Path positive for gastritis and reflux esophagitis.  Repeat colonoscopy in 5 years.     Most recent blood work done on 7/3/2024 showed an elevated ALT at 46.  Alkaline phosphatase, AST and total bili were normal.     Most recent liver imaging showed a normal liver on CT scan of the abdomen and pelvis on 2/3/2024.  No evidence of cirrhosis noted.     She does report terrible constipation. She has tried stool softeners and miralax without relief. Has not tried LINZESS> Having awful reflux. She is tasting nastiness in her mouth and belching and burping. She is on pain meds.       She has tried the LINZESS 72 and 145  "but those were not working well for her. She has still been doubling up on those and she might have a BM once every few days. Has been taking more pain meds lately due to her shoulder. Having more nausea. GES was delayed at 13% at the 4 hour fuad. Zofran isnt helping the nausea. Has been taking nexium 40 mg BID. She thought at first it was helping but doesn't feel like it is now.      Results       Result Review :       CMP          2/15/2024    16:49   CMP   Glucose 105    BUN 19    Creatinine 0.78    EGFR 81.8    Sodium 140    Potassium 4.1    Chloride 103    Calcium 8.9    Total Protein 6.9    Albumin 4.1    Globulin 2.8    Total Bilirubin 0.3    Alkaline Phosphatase 78    AST (SGOT) 18    ALT (SGPT) 18    Albumin/Globulin Ratio 1.5    BUN/Creatinine Ratio 24.4    Anion Gap 10.6                Lipase No results found for: \"LIPASE\"  Amylase No results found for: \"AMYLASE\"  Iron Profile No results found for: \"IRON\", \"TIBC\", \"LABIRON\", \"TRANSFERRIN\"  Ferritin No results found for: \"FERRITIN\"  ESR (Sed Rate)   Sed Rate   Date Value Ref Range Status   07/17/2019 1 0 - 30 mm/h Final     CRP (C-Reactive)   C-Reactive Protein   Date Value Ref Range Status   07/17/2019 6.00 (H) 0.00 - 5.00 mg/L Final     Comment:     In very rare cases, gammopathy, in particular type IgM  (Waldenstrom's macroglobulinemia), may cause unreliable results.       Liver Workup   Protime   Date Value Ref Range Status   07/03/2024 14.3 (H) 9.4 - 12.5 Second Final   08/27/2019 16.1 (H) 10.3 - 13.3 s Final     Comment:     (note)  Anticoagulants may alter the results of Laboratory   Coagulation assays. New-generation anticoagulants such as   direct Thrombin inhibitors (Dabigatran/Pradaxa, Argatroban,   Bivalrudin) and direct/indirect factor Xa inhibitors   (Rivaroxaban/Xarelto,Apixaban/Eliquis, Danaparoid/Orgaran,   Fondaparinux/Arixtra) have been shown to affect the results   of Laboratory Coagulation assays, creating falsely elevated   or " decreased values. Correlation with medication history is   advised.     INR   Date Value Ref Range Status   07/03/2024 1.3  Final               Past Medical History       Past Medical History:   Diagnosis Date    Anxiety     Atrial fibrillation     Chronic pain disorder     Depression     Disease of thyroid gland     Mood disorder 02/01/2022    Obsessive-compulsive disorder     Panic disorder     PONV (postoperative nausea and vomiting)     Psychiatric illness     Self-injurious behavior     patient says she picks at her skin when nervous       Past Surgical History:   Procedure Laterality Date    BREAST SURGERY Left     tumor removed    CARDIAC SURGERY      installed LOOP recorder    CHOLECYSTECTOMY      COLONOSCOPY N/A 12/11/2023    Procedure: COLONOSCOPY WITH POLYPECTOMIES;  Surgeon: Eun Gasca MD;  Location: [facility];  Service: Gastroenterology;  Laterality: N/A;  COLON POLYPS    ENDOSCOPY N/A 12/11/2023    Procedure: ESOPHAGOGASTRODUODENOSCOPY WITH BIOPSIES;  Surgeon: Eun Gasca MD;  Location: [facility] ENDOSCOPY;  Service: Gastroenterology;  Laterality: N/A;  GASTRITIS, HIATAL HERNIA    GASTRIC SLEEVE LAPAROSCOPIC      HERNIA REPAIR      HYSTERECTOMY      REPLACEMENT TOTAL KNEE Left          Current Outpatient Medications:     Acetaminophen (Tylenol) 325 MG capsule, Take 1,000 mg by mouth., Disp: , Rfl:     albuterol (PROVENTIL) (2.5 MG/3ML) 0.083% nebulizer solution, Inhale 1 vial by nebulization Every 6 (Six) Hours As Needed for Wheezing., Disp: 75 mL, Rfl: 0    amiodarone (PACERONE) 200 MG tablet, Take 1 tablet by mouth 2 (Two) Times a Day., Disp: , Rfl:     apixaban (ELIQUIS) 5 MG tablet tablet, Take 1 tablet by mouth 2 (Two) Times a Day., Disp: , Rfl:     bumetanide (BUMEX) 2 MG tablet, Take 1 tablet by mouth 2 (Two) Times a Day., Disp: 180 tablet, Rfl: 0    busPIRone (BUSPAR) 30 MG tablet, Take 1 tablet by mouth 2 (Two) Times a Day, Disp: 180 tablet, Rfl: 0    cetirizine  (zyrTEC) 10 MG tablet, Take 1 tablet by mouth Daily., Disp: 90 tablet, Rfl: 1    desvenlafaxine (Pristiq) 100 MG 24 hr tablet, Take 1 tablet by mouth Daily, Disp: 90 tablet, Rfl: 0    dilTIAZem (TIAZAC) 180 MG 24 hr capsule, Take 1 capsule by mouth Daily., Disp: 90 capsule, Rfl: 0    esomeprazole (nexIUM) 40 MG capsule, Take 1 capsule by mouth 2 (Two) Times a Day., Disp: 60 capsule, Rfl: 5    HYDROcodone-acetaminophen (NORCO) 7.5-325 MG per tablet, Take 1 tablet by mouth Every 8 (Eight) Hours As Needed for pain., Disp: 90 tablet, Rfl: 0    hydrOXYzine (ATARAX) 10 MG tablet, Take 1 tablet by mouth 3 (Three) Times a Day As Needed for Anxiety., Disp: 90 tablet, Rfl: 0    letrozole (FEMARA) 2.5 MG tablet, Take 1 tablet by mouth Daily., Disp: , Rfl:     levothyroxine (Synthroid) 200 MCG tablet, Take 1 tablet by mouth Daily., Disp: 90 tablet, Rfl: 0    linaclotide (Linzess) 145 MCG capsule capsule, Take 1 capsule by mouth Every Morning Before Breakfast., Disp: 90 capsule, Rfl: 3    linaclotide (Linzess) 145 MCG capsule capsule, Take 1 capsule by mouth Every Morning Before Breakfast., Disp: 90 capsule, Rfl: 2    Menthol (Biofreeze) 5 % patch, Apply 1 patch topically Every Night., Disp: 90 patch, Rfl: 1    montelukast (SINGULAIR) 10 MG tablet, Take 1 tablet by mouth Every Night., Disp: 90 tablet, Rfl: 0    nortriptyline (PAMELOR) 10 MG capsule, Take 1 capsule by mouth as directed 3 times a day., Disp: 90 capsule, Rfl: 0    nystatin (MYCOSTATIN) 134663 UNIT/GM powder, Apply  topically to the appropriate area as directed 4 (Four) Times a Day., Disp: 60 g, Rfl: 5    ondansetron (Zofran) 4 MG tablet, Take 1 tablet by mouth Every 8 (Eight) Hours As Needed for Nausea or Vomiting., Disp: 20 tablet, Rfl: 0    pramipexole (MIRAPEX) 0.25 MG tablet, TAKE ONE TABLET BY MOUTH EVERY DAY AT NIGHT, Disp: 90 tablet, Rfl: 0    spironolactone (ALDACTONE) 25 MG tablet, Take 1 tablet by mouth 2 (Two) Times a Day., Disp: 60 tablet, Rfl: 2     vitamin D3 (vitamin d) 125 MCG (5000 UT) capsule capsule, Take 1 capsule by mouth Daily., Disp: 90 capsule, Rfl: 1    Fluticasone Furoate-Vilanterol (Breo Ellipta) 100-25 MCG/ACT aerosol powder , Inhale 1 puff Daily. (Patient not taking: Reported on 1/20/2025), Disp: 60 each, Rfl: 0    furosemide (LASIX) 20 MG tablet, TAKE ONE TABLET BY MOUTH EVERY DAY (Patient not taking: Reported on 1/20/2025), Disp: 90 tablet, Rfl: 0    hydroCHLOROthiazide 25 MG tablet, Take 1 tablet by mouth Daily. (Patient not taking: Reported on 1/20/2025), Disp: 90 tablet, Rfl: 0    HYDROcodone-acetaminophen (NORCO) 5-325 MG per tablet, Take 1 tablet by mouth every 8-12 hours as needed (Patient not taking: Reported on 1/20/2025), Disp: 75 tablet, Rfl: 0    MELATONIN PO, Take 1 tablet by mouth At Night As Needed. (Patient not taking: Reported on 1/20/2025), Disp: , Rfl:     mupirocin (BACTROBAN) 2 % ointment, Apply to each nostril 2 times daily for 5 days before sugery and morning of surgery (Patient not taking: Reported on 1/20/2025), Disp: 22 g, Rfl: 0    Naloxegol Oxalate (Movantik) 25 MG tablet, Take 1 tablet by mouth Every Morning., Disp: 30 tablet, Rfl: 1    Naloxone HCl (Kloxxado) 8 MG/0.1ML liquid, Instill 1 spray in 1 nostril as needed for over sedation or respiratory depression from opioid use.  Repeat 1 spray in alternate nostril every 2-3 minutes until responsive or EMS arrives (Patient not taking: Reported on 1/20/2025), Disp: 2 each, Rfl: 0    potassium chloride (KLOR-CON M10) 10 MEQ CR tablet, Take 1 tablet by mouth. (Patient not taking: Reported on 1/20/2025), Disp: , Rfl:      Allergies   Allergen Reactions    Atorvastatin Myalgia     Joint pain    Methylmethacrylate Swelling and Other (See Comments)     (bone cement)      Adhesive Tape Rash     Off-brand bandaids         Family History   Problem Relation Age of Onset    Anxiety disorder Mother     Dementia Mother     Depression Mother     Anxiety disorder Sister      "Depression Sister     Colon cancer Maternal Aunt     Colon cancer Daughter         Social History     Social History Narrative    Not on file       Objective       Review of Systems   Constitutional:  Negative for appetite change, fatigue, fever, unexpected weight gain and unexpected weight loss.   HENT:  Negative for trouble swallowing.    Respiratory:  Negative for cough, choking, chest tightness, shortness of breath, wheezing and stridor.    Cardiovascular:  Negative for chest pain, palpitations and leg swelling.   Gastrointestinal:  Positive for abdominal distention, constipation, nausea, vomiting, GERD and indigestion. Negative for abdominal pain, anal bleeding, blood in stool, diarrhea and rectal pain.        Vital Signs:   /41 (BP Location: Left arm, Patient Position: Sitting, Cuff Size: Large Adult)   Pulse 60   Temp 96.4 °F (35.8 °C) (Skin)   Ht 177.8 cm (70\")   Wt (!) 142 kg (313 lb)   SpO2 98%   BMI 44.91 kg/m²       Physical Exam  Constitutional:       General: She is not in acute distress.     Appearance: She is well-developed. She is not ill-appearing.   HENT:      Head: Normocephalic.   Eyes:      Pupils: Pupils are equal, round, and reactive to light.   Cardiovascular:      Rate and Rhythm: Normal rate and regular rhythm.      Heart sounds: Normal heart sounds.   Pulmonary:      Effort: Pulmonary effort is normal.      Breath sounds: Normal breath sounds.   Abdominal:      General: Bowel sounds are normal. There is distension.      Palpations: Abdomen is soft. There is no mass.      Tenderness: There is no abdominal tenderness. There is no guarding or rebound.      Hernia: No hernia is present.   Musculoskeletal:         General: Normal range of motion.   Skin:     General: Skin is warm and dry.   Neurological:      Mental Status: She is alert and oriented to person, place, and time.   Psychiatric:         Speech: Speech normal.         Behavior: Behavior normal.         Judgment: " Judgment normal.           Assessment & Plan          Assessment and Plan    Diagnoses and all orders for this visit:    1. Drug-induced constipation (Primary)  -     Naloxegol Oxalate (Movantik) 25 MG tablet; Take 1 tablet by mouth Every Morning.  Dispense: 30 tablet; Refill: 1    2. Gastroparesis    3. Gastroesophageal reflux disease with esophagitis without hemorrhage    4. History of cirrhosis    5. Hiatal hernia    Sounds like constipation is clearly not well-controlled.  Sounds like her drug-induced constipation needs stronger medication.  We will trial Movantik.  Prescription sent to her pharmacy.  Gastric emptying study did show gastroparesis.  We had a long discussion today about gastroparesis.  What dietary changes she needs to make.  Gastroparesis handouts given today.  I think if we can get her constipation moving better it would also help her stomach issues.  Continue Nexium twice daily for now.  Most recent LFTs were normal.  No evidence of cirrhosis on most recent liver imaging.  Patient to call the office in 2 weeks with an update.  Patient to follow-up with me in 3 months.  Patient and daughter are agreeable to the plan.            Follow Up       Follow Up   Return in about 3 months (around 4/20/2025) for GERD, CONSTIPATION, GASTROPARESIS.  Patient was given instructions and counseling regarding her condition or for health maintenance advice. Please see specific information pulled into the AVS if appropriate.

## 2025-01-20 NOTE — PATIENT INSTRUCTIONS
Stop linzess  Start movantik 25 mg daily (let me know if any issues with pharmacy/insurance)  Start gastroparesis diet     Give me update in 2 weeks (call office or MYCHART me)

## 2025-01-23 RX ORDER — PROMETHAZINE HYDROCHLORIDE 25 MG/1
25 TABLET ORAL EVERY 6 HOURS PRN
Qty: 20 TABLET | Refills: 2 | Status: SHIPPED | OUTPATIENT
Start: 2025-01-23

## 2025-02-06 ENCOUNTER — LAB REQUISITION (OUTPATIENT)
Dept: LAB | Facility: HOSPITAL | Age: 70
End: 2025-02-06
Payer: MEDICARE

## 2025-02-06 DIAGNOSIS — M01.X62: ICD-10-CM

## 2025-02-06 LAB
ANION GAP SERPL CALCULATED.3IONS-SCNC: 12.1 MMOL/L (ref 5–15)
BASOPHILS # BLD AUTO: 0.03 10*3/MM3 (ref 0–0.2)
BASOPHILS NFR BLD AUTO: 0.4 % (ref 0–1.5)
BUN SERPL-MCNC: 12 MG/DL (ref 8–23)
BUN/CREAT SERPL: 12.9 (ref 7–25)
CALCIUM SPEC-SCNC: 8.8 MG/DL (ref 8.6–10.5)
CHLORIDE SERPL-SCNC: 94 MMOL/L (ref 98–107)
CO2 SERPL-SCNC: 31.9 MMOL/L (ref 22–29)
CREAT SERPL-MCNC: 0.93 MG/DL (ref 0.57–1)
CRP SERPL-MCNC: 11.82 MG/DL (ref 0–0.5)
DEPRECATED RDW RBC AUTO: 50 FL (ref 37–54)
EGFRCR SERPLBLD CKD-EPI 2021: 65.8 ML/MIN/1.73
EOSINOPHIL # BLD AUTO: 0.36 10*3/MM3 (ref 0–0.4)
EOSINOPHIL NFR BLD AUTO: 4.4 % (ref 0.3–6.2)
ERYTHROCYTE [DISTWIDTH] IN BLOOD BY AUTOMATED COUNT: 13.9 % (ref 12.3–15.4)
GLUCOSE SERPL-MCNC: 168 MG/DL (ref 65–99)
HCT VFR BLD AUTO: 38.9 % (ref 34–46.6)
HGB BLD-MCNC: 12.2 G/DL (ref 12–15.9)
IMM GRANULOCYTES # BLD AUTO: 0.07 10*3/MM3 (ref 0–0.05)
IMM GRANULOCYTES NFR BLD AUTO: 0.9 % (ref 0–0.5)
LYMPHOCYTES # BLD AUTO: 1.19 10*3/MM3 (ref 0.7–3.1)
LYMPHOCYTES NFR BLD AUTO: 14.7 % (ref 19.6–45.3)
MCH RBC QN AUTO: 30.4 PG (ref 26.6–33)
MCHC RBC AUTO-ENTMCNC: 31.4 G/DL (ref 31.5–35.7)
MCV RBC AUTO: 97 FL (ref 79–97)
MONOCYTES # BLD AUTO: 0.53 10*3/MM3 (ref 0.1–0.9)
MONOCYTES NFR BLD AUTO: 6.5 % (ref 5–12)
NEUTROPHILS NFR BLD AUTO: 5.92 10*3/MM3 (ref 1.7–7)
NEUTROPHILS NFR BLD AUTO: 73.1 % (ref 42.7–76)
PLATELET # BLD AUTO: 317 10*3/MM3 (ref 140–450)
PMV BLD AUTO: 10.3 FL (ref 6–12)
POTASSIUM SERPL-SCNC: 3.2 MMOL/L (ref 3.5–5.2)
RBC # BLD AUTO: 4.01 10*6/MM3 (ref 3.77–5.28)
SODIUM SERPL-SCNC: 138 MMOL/L (ref 136–145)
WBC NRBC COR # BLD AUTO: 8.1 10*3/MM3 (ref 3.4–10.8)

## 2025-02-06 PROCEDURE — 86140 C-REACTIVE PROTEIN: CPT | Performed by: INTERNAL MEDICINE

## 2025-02-06 PROCEDURE — 80048 BASIC METABOLIC PNL TOTAL CA: CPT | Performed by: INTERNAL MEDICINE

## 2025-02-06 PROCEDURE — 85025 COMPLETE CBC W/AUTO DIFF WBC: CPT | Performed by: INTERNAL MEDICINE

## 2025-02-18 DIAGNOSIS — K59.03 DRUG-INDUCED CONSTIPATION: ICD-10-CM

## 2025-03-17 ENCOUNTER — TRANSCRIBE ORDERS (OUTPATIENT)
Dept: ADMINISTRATIVE | Facility: HOSPITAL | Age: 70
End: 2025-03-17
Payer: OTHER GOVERNMENT

## 2025-03-17 DIAGNOSIS — Z12.31 VISIT FOR SCREENING MAMMOGRAM: Primary | ICD-10-CM

## 2025-03-17 DIAGNOSIS — Z78.0 POSTMENOPAUSAL: Primary | ICD-10-CM

## 2025-03-19 PROCEDURE — 86140 C-REACTIVE PROTEIN: CPT | Performed by: FAMILY MEDICINE

## 2025-03-19 PROCEDURE — 85652 RBC SED RATE AUTOMATED: CPT | Performed by: FAMILY MEDICINE

## 2025-03-19 PROCEDURE — 80053 COMPREHEN METABOLIC PANEL: CPT | Performed by: FAMILY MEDICINE

## 2025-03-19 PROCEDURE — 80202 ASSAY OF VANCOMYCIN: CPT | Performed by: FAMILY MEDICINE

## 2025-03-19 PROCEDURE — 85025 COMPLETE CBC W/AUTO DIFF WBC: CPT | Performed by: FAMILY MEDICINE

## 2025-03-24 ENCOUNTER — LAB REQUISITION (OUTPATIENT)
Dept: LAB | Facility: HOSPITAL | Age: 70
End: 2025-03-24
Payer: MEDICARE

## 2025-03-24 DIAGNOSIS — T84.50XA INFECTION AND INFLAMMATORY REACTION DUE TO UNSPECIFIED INTERNAL JOINT PROSTHESIS, INITIAL ENCOUNTER: ICD-10-CM

## 2025-03-24 LAB
ALBUMIN SERPL-MCNC: 3.6 G/DL (ref 3.5–5.2)
ALBUMIN/GLOB SERPL: 1.1 G/DL
ALP SERPL-CCNC: 93 U/L (ref 39–117)
ALT SERPL W P-5'-P-CCNC: <5 U/L (ref 1–33)
ANION GAP SERPL CALCULATED.3IONS-SCNC: 14.2 MMOL/L (ref 5–15)
AST SERPL-CCNC: 21 U/L (ref 1–32)
BASOPHILS # BLD AUTO: 0.06 10*3/MM3 (ref 0–0.2)
BASOPHILS NFR BLD AUTO: 0.7 % (ref 0–1.5)
BILIRUB SERPL-MCNC: 0.3 MG/DL (ref 0–1.2)
BUN SERPL-MCNC: 6 MG/DL (ref 8–23)
BUN/CREAT SERPL: 6.8 (ref 7–25)
CALCIUM SPEC-SCNC: 9.1 MG/DL (ref 8.6–10.5)
CHLORIDE SERPL-SCNC: 96 MMOL/L (ref 98–107)
CO2 SERPL-SCNC: 27.8 MMOL/L (ref 22–29)
CREAT SERPL-MCNC: 0.88 MG/DL (ref 0.57–1)
CRP SERPL-MCNC: 1.73 MG/DL (ref 0–0.5)
DEPRECATED RDW RBC AUTO: 47.4 FL (ref 37–54)
EGFRCR SERPLBLD CKD-EPI 2021: 70.4 ML/MIN/1.73
EOSINOPHIL # BLD AUTO: 0.52 10*3/MM3 (ref 0–0.4)
EOSINOPHIL NFR BLD AUTO: 6 % (ref 0.3–6.2)
ERYTHROCYTE [DISTWIDTH] IN BLOOD BY AUTOMATED COUNT: 13.4 % (ref 12.3–15.4)
ERYTHROCYTE [SEDIMENTATION RATE] IN BLOOD: 43 MM/HR (ref 0–30)
GLOBULIN UR ELPH-MCNC: 3.3 GM/DL
GLUCOSE SERPL-MCNC: 105 MG/DL (ref 65–99)
HCT VFR BLD AUTO: 40.1 % (ref 34–46.6)
HGB BLD-MCNC: 12.5 G/DL (ref 12–15.9)
IMM GRANULOCYTES # BLD AUTO: 0.01 10*3/MM3 (ref 0–0.05)
IMM GRANULOCYTES NFR BLD AUTO: 0.1 % (ref 0–0.5)
LYMPHOCYTES # BLD AUTO: 1.52 10*3/MM3 (ref 0.7–3.1)
LYMPHOCYTES NFR BLD AUTO: 17.5 % (ref 19.6–45.3)
MCH RBC QN AUTO: 29.5 PG (ref 26.6–33)
MCHC RBC AUTO-ENTMCNC: 31.2 G/DL (ref 31.5–35.7)
MCV RBC AUTO: 94.6 FL (ref 79–97)
MONOCYTES # BLD AUTO: 0.71 10*3/MM3 (ref 0.1–0.9)
MONOCYTES NFR BLD AUTO: 8.2 % (ref 5–12)
NEUTROPHILS NFR BLD AUTO: 5.88 10*3/MM3 (ref 1.7–7)
NEUTROPHILS NFR BLD AUTO: 67.5 % (ref 42.7–76)
PLATELET # BLD AUTO: 375 10*3/MM3 (ref 140–450)
PMV BLD AUTO: 10.7 FL (ref 6–12)
POTASSIUM SERPL-SCNC: 2.9 MMOL/L (ref 3.5–5.2)
PROT SERPL-MCNC: 6.9 G/DL (ref 6–8.5)
RBC # BLD AUTO: 4.24 10*6/MM3 (ref 3.77–5.28)
SODIUM SERPL-SCNC: 138 MMOL/L (ref 136–145)
WBC NRBC COR # BLD AUTO: 8.7 10*3/MM3 (ref 3.4–10.8)

## 2025-03-24 PROCEDURE — 85025 COMPLETE CBC W/AUTO DIFF WBC: CPT | Performed by: FAMILY MEDICINE

## 2025-03-24 PROCEDURE — 85652 RBC SED RATE AUTOMATED: CPT | Performed by: FAMILY MEDICINE

## 2025-03-24 PROCEDURE — 80053 COMPREHEN METABOLIC PANEL: CPT | Performed by: FAMILY MEDICINE

## 2025-03-24 PROCEDURE — 86140 C-REACTIVE PROTEIN: CPT | Performed by: FAMILY MEDICINE

## 2025-04-21 ENCOUNTER — TELEPHONE (OUTPATIENT)
Dept: GASTROENTEROLOGY | Facility: CLINIC | Age: 70
End: 2025-04-21

## 2025-04-21 NOTE — TELEPHONE ENCOUNTER
“Please be informed that patient has passed. Patient has been marked  in the system. The date of death is: 2025.    Caller: JULIO ZAFAR    Relationship: Emergency Contact    Best call back number: [phone]    Did the patient have surgery within 30 days of their passing (Y/N): N

## (undated) DEVICE — SOL IRRG H2O PL/BG 1000ML STRL

## (undated) DEVICE — BLCK/BITE BLOX WO/DENTL/RIM W/STRAP 54F

## (undated) DEVICE — SOLIDIFIER LIQLOC PLS 1500CC BT

## (undated) DEVICE — THE SINGLE USE ETRAP – POLYP TRAP IS USED FOR SUCTION RETRIEVAL OF ENDOSCOPICALLY REMOVED POLYPS.: Brand: ETRAP

## (undated) DEVICE — Device: Brand: DEFENDO AIR/WATER/SUCTION AND BIOPSY VALVE

## (undated) DEVICE — LINER SURG CANSTR SXN S/RIGD 1500CC

## (undated) DEVICE — SINGLE-USE BIOPSY FORCEPS: Brand: RADIAL JAW 4

## (undated) DEVICE — Device

## (undated) DEVICE — SNAR E/S POLYP SNAREMASTER OVL/10MM 2.8X2300MM YEL

## (undated) DEVICE — CONN JET HYDRA H20 AUXILIARY DISP